# Patient Record
Sex: FEMALE | Race: BLACK OR AFRICAN AMERICAN | NOT HISPANIC OR LATINO | Employment: OTHER | URBAN - METROPOLITAN AREA
[De-identification: names, ages, dates, MRNs, and addresses within clinical notes are randomized per-mention and may not be internally consistent; named-entity substitution may affect disease eponyms.]

---

## 2017-02-27 DIAGNOSIS — Z76.82 AWAITING ORGAN TRANSPLANT: Primary | ICD-10-CM

## 2017-03-02 ENCOUNTER — LAB VISIT (OUTPATIENT)
Dept: LAB | Facility: HOSPITAL | Age: 42
End: 2017-03-02
Attending: NURSE PRACTITIONER
Payer: COMMERCIAL

## 2017-03-02 DIAGNOSIS — Z76.82 AWAITING ORGAN TRANSPLANT: ICD-10-CM

## 2017-03-02 PROCEDURE — 86833 HLA CLASS II HIGH DEFIN QUAL: CPT

## 2017-03-02 PROCEDURE — 86832 HLA CLASS I HIGH DEFIN QUAL: CPT

## 2017-03-13 DIAGNOSIS — Z76.82 ORGAN TRANSPLANT CANDIDATE: Primary | ICD-10-CM

## 2017-03-27 LAB — HPRA INTERPRETATION: NORMAL

## 2017-04-06 LAB
CLASS I ANTIBODIES - LUMINEX: NORMAL
CLASS I ANTIBODY COMMENTS - LUMINEX: NORMAL
CLASS II ANTIBODIES - LUMINEX: NORMAL
CLASS II ANTIBODY COMMENTS - LUMINEX: NORMAL
CPRA %: 96
SERUM COLLECTION DT - LUMINEX CLASS I: NORMAL
SERUM COLLECTION DT - LUMINEX CLASS II: NORMAL
SPCL1 TESTING DATE: NORMAL
SPCL2 TESTING DATE: NORMAL

## 2017-04-13 ENCOUNTER — LAB VISIT (OUTPATIENT)
Dept: LAB | Facility: HOSPITAL | Age: 42
End: 2017-04-13
Payer: COMMERCIAL

## 2017-04-13 DIAGNOSIS — Z76.82 ORGAN TRANSPLANT CANDIDATE: ICD-10-CM

## 2017-05-12 ENCOUNTER — LAB VISIT (OUTPATIENT)
Dept: LAB | Facility: HOSPITAL | Age: 42
End: 2017-05-12
Payer: COMMERCIAL

## 2017-05-12 DIAGNOSIS — Z76.82 AWAITING ORGAN TRANSPLANT: ICD-10-CM

## 2017-05-15 PROCEDURE — 86829 HLA CLASS I/II ANTIBODY QUAL: CPT | Mod: PO,TXP

## 2017-05-15 PROCEDURE — 86829 HLA CLASS I/II ANTIBODY QUAL: CPT | Mod: 91,PO,TXP

## 2017-05-16 LAB — HPRA INTERPRETATION: NORMAL

## 2017-06-08 DIAGNOSIS — Z76.82 ORGAN TRANSPLANT CANDIDATE: ICD-10-CM

## 2017-06-08 DIAGNOSIS — N18.6 ESRD (END STAGE RENAL DISEASE): Primary | ICD-10-CM

## 2017-06-22 ENCOUNTER — LAB VISIT (OUTPATIENT)
Dept: LAB | Facility: HOSPITAL | Age: 42
End: 2017-06-22
Payer: COMMERCIAL

## 2017-06-22 DIAGNOSIS — Z76.82 AWAITING ORGAN TRANSPLANT: ICD-10-CM

## 2017-07-10 LAB — HPRA INTERPRETATION: NORMAL

## 2017-07-10 PROCEDURE — 86829 HLA CLASS I/II ANTIBODY QUAL: CPT | Mod: 91,PO,TXP

## 2017-07-10 PROCEDURE — 86829 HLA CLASS I/II ANTIBODY QUAL: CPT | Mod: PO,TXP

## 2017-07-11 PROCEDURE — 86829 HLA CLASS I/II ANTIBODY QUAL: CPT | Mod: 91,PO,TXP

## 2017-07-11 PROCEDURE — 86829 HLA CLASS I/II ANTIBODY QUAL: CPT | Mod: PO,TXP

## 2017-07-17 ENCOUNTER — LAB VISIT (OUTPATIENT)
Dept: LAB | Facility: HOSPITAL | Age: 42
End: 2017-07-17
Payer: COMMERCIAL

## 2017-07-17 DIAGNOSIS — Z76.82 AWAITING ORGAN TRANSPLANT: ICD-10-CM

## 2017-07-31 DIAGNOSIS — Z76.82 ORGAN TRANSPLANT CANDIDATE: ICD-10-CM

## 2017-07-31 LAB — HPRA INTERPRETATION: NORMAL

## 2017-07-31 PROCEDURE — 86833 HLA CLASS II HIGH DEFIN QUAL: CPT | Mod: PO,TXP

## 2017-07-31 PROCEDURE — 86832 HLA CLASS I HIGH DEFIN QUAL: CPT | Mod: PO,TXP

## 2017-08-07 LAB
CLASS I ANTIBODIES - LUMINEX: NORMAL
CLASS I ANTIBODY COMMENTS - LUMINEX: NORMAL
CLASS II ANTIBODIES - LUMINEX: NORMAL
CLASS II ANTIBODY COMMENTS - LUMINEX: NORMAL
CPRA %: 98
SERUM COLLECTION DT - LUMINEX CLASS I: NORMAL
SERUM COLLECTION DT - LUMINEX CLASS II: NORMAL
SPCL1 TESTING DATE: NORMAL
SPCL2 TESTING DATE: NORMAL
SPCLU TESTING DATE: NORMAL

## 2017-08-10 PROCEDURE — 99001 SPECIMEN HANDLING PT-LAB: CPT | Mod: PO,TXP

## 2017-08-11 ENCOUNTER — LAB VISIT (OUTPATIENT)
Dept: LAB | Facility: HOSPITAL | Age: 42
End: 2017-08-11
Payer: COMMERCIAL

## 2017-08-11 DIAGNOSIS — Z76.82 AWAITING ORGAN TRANSPLANT: ICD-10-CM

## 2017-08-14 LAB — HPRA INTERPRETATION: NORMAL

## 2017-08-18 PROBLEM — N18.6 ESRD (END STAGE RENAL DISEASE): Status: ACTIVE | Noted: 2017-08-18

## 2017-08-18 PROBLEM — T85.71XA PERITONEAL DIALYSIS-ASSOCIATED PERITONITIS: Status: ACTIVE | Noted: 2017-08-18

## 2017-08-24 ENCOUNTER — HOSPITAL ENCOUNTER (OUTPATIENT)
Dept: RADIOLOGY | Facility: HOSPITAL | Age: 42
Discharge: HOME | End: 2017-08-24
Attending: NURSE PRACTITIONER
Payer: COMMERCIAL

## 2017-08-24 ENCOUNTER — OFFICE VISIT (OUTPATIENT)
Dept: TRANSPLANT | Facility: CLINIC | Age: 42
End: 2017-08-24
Payer: COMMERCIAL

## 2017-08-24 ENCOUNTER — HOSPITAL ENCOUNTER (OUTPATIENT)
Dept: CARDIOLOGY | Facility: CLINIC | Age: 42
Discharge: HOME | End: 2017-08-24
Payer: COMMERCIAL

## 2017-08-24 VITALS
SYSTOLIC BLOOD PRESSURE: 173 MMHG | RESPIRATION RATE: 16 BRPM | HEART RATE: 84 BPM | WEIGHT: 214.31 LBS | DIASTOLIC BLOOD PRESSURE: 86 MMHG | HEIGHT: 65 IN | BODY MASS INDEX: 35.71 KG/M2 | TEMPERATURE: 99 F | OXYGEN SATURATION: 99 %

## 2017-08-24 DIAGNOSIS — D63.1 ANEMIA IN STAGE 5 CHRONIC KIDNEY DISEASE, NOT ON CHRONIC DIALYSIS: ICD-10-CM

## 2017-08-24 DIAGNOSIS — N18.6 BENIGN HYPERTENSION WITH ESRD (END-STAGE RENAL DISEASE): Primary | ICD-10-CM

## 2017-08-24 DIAGNOSIS — I12.0 BENIGN HYPERTENSION WITH ESRD (END-STAGE RENAL DISEASE): Primary | ICD-10-CM

## 2017-08-24 DIAGNOSIS — N18.5 ANEMIA IN STAGE 5 CHRONIC KIDNEY DISEASE, NOT ON CHRONIC DIALYSIS: ICD-10-CM

## 2017-08-24 DIAGNOSIS — N18.6 ESRD (END STAGE RENAL DISEASE): ICD-10-CM

## 2017-08-24 DIAGNOSIS — E11.22 DIABETES MELLITUS WITH ESRD (END-STAGE RENAL DISEASE): ICD-10-CM

## 2017-08-24 DIAGNOSIS — Z79.4 INSULIN LONG-TERM USE: ICD-10-CM

## 2017-08-24 DIAGNOSIS — N18.6 DIABETES MELLITUS WITH ESRD (END-STAGE RENAL DISEASE): ICD-10-CM

## 2017-08-24 DIAGNOSIS — Z76.82 ORGAN TRANSPLANT CANDIDATE: Primary | ICD-10-CM

## 2017-08-24 DIAGNOSIS — Z76.82 PATIENT ON WAITING LIST FOR KIDNEY TRANSPLANT: ICD-10-CM

## 2017-08-24 LAB
DIASTOLIC DYSFUNCTION: NO
ESTIMATED PA SYSTOLIC PRESSURE: 22.18
RETIRED EF AND QEF - SEE NOTES: 60 (ref 55–65)

## 2017-08-24 PROCEDURE — 93978 VASCULAR STUDY: CPT | Mod: TC,TXP

## 2017-08-24 PROCEDURE — 93978 VASCULAR STUDY: CPT | Mod: 26,TXP,, | Performed by: RADIOLOGY

## 2017-08-24 PROCEDURE — 3008F BODY MASS INDEX DOCD: CPT | Mod: S$GLB,TXP,, | Performed by: NURSE PRACTITIONER

## 2017-08-24 PROCEDURE — 71020 XR CHEST PA AND LATERAL: CPT | Mod: 26,NTX,, | Performed by: RADIOLOGY

## 2017-08-24 PROCEDURE — 4010F ACE/ARB THERAPY RXD/TAKEN: CPT | Mod: S$GLB,TXP,, | Performed by: NURSE PRACTITIONER

## 2017-08-24 PROCEDURE — 99215 OFFICE O/P EST HI 40 MIN: CPT | Mod: S$GLB,TXP,, | Performed by: NURSE PRACTITIONER

## 2017-08-24 PROCEDURE — 93306 TTE W/DOPPLER COMPLETE: CPT | Mod: S$GLB,TXP,, | Performed by: INTERNAL MEDICINE

## 2017-08-24 PROCEDURE — 3044F HG A1C LEVEL LT 7.0%: CPT | Mod: S$GLB,TXP,, | Performed by: NURSE PRACTITIONER

## 2017-08-24 PROCEDURE — 99999 PR PBB SHADOW E&M-EST. PATIENT-LVL V: CPT | Mod: PBBFAC,TXP,, | Performed by: NURSE PRACTITIONER

## 2017-08-24 PROCEDURE — 71020 XR CHEST PA AND LATERAL: CPT | Mod: TC,TXP

## 2017-08-24 RX ORDER — CALCIUM ACETATE 667 MG/1
1334 CAPSULE ORAL
COMMUNITY
End: 2018-01-16

## 2017-08-24 NOTE — LETTER
August 26, 2017        Roselia Rose  [address] [address] [address]  Phone: [phone]  Fax: [phone]             [facility]  [address]  [facility] [address]  Phone: [phone]   Patient: Gilda Schroeder   MR Number: [id number]   YOB: 1975   Date of Visit: 8/24/2017       Dear Dr. Roselia Rose    Thank you for referring Gilda Schroeder to me for evaluation. Attached you will find relevant portions of my assessment and plan of care.    If you have questions, please do not hesitate to call me. I look forward to following Gilda Schroeder along with you.    Sincerely,    Sonia Mckeon, NP    Enclosure    If you would like to receive this communication electronically, please contact [email or web address] or [phone] to request [facility] access.    [facility] is a tool which provides read-only access to select patient information with whom you have a relationship. Its easy to use and provides real time access to review your patients record including encounter summaries, notes, results, and demographic information.    If you feel you have received this communication in error or would no longer like to receive these types of communications, please e-mail [email or web address]

## 2017-08-24 NOTE — PROGRESS NOTES
Kidney Transplant Recipient Reevalulation    Referring Physician: Roselia Rose  Current Nephrologist: Roselia Rose  Waitlist Status: active  Dialysis Start Date: 3/31/2016    Subjective:     CC:  Annual reassessment of kidney transplant candidacy.    HPI:  Ms. Jovani Schroeder is a 42 y.o. year old Black or  female with ESRD secondary to diabetic nephropathy and HTN.  She has been on the wait list for a kidney transplant at [facility] since 3/31/2016. Patient was initially on PD starting on 3/31/2017. She  started to have a peritonitis infection on 7/24/2017. She then  completed 3 courses of antibiotics, which resulted in a yeast infection. A right Chest catheter was inserted,  the PD catheter was pulled,  and she started HD on  8/21/2017 . Patient is now dialyzing on MWF schedule.  Patient reports that she is tolerating dialysis well. She has a right  chest catheter. . Patient denies any recent hospitalizations or ED visits. She is scheduled on 8/28 for AV fistula placement. She plans to start the education process for home HD.  She states she is feeling much better now.     Reports her son called today  About being a donor.      8/24/2017  Chest xray  Impression   No convincing evidence of intrathoracic disease identified.     8/24/2017  Iliac doppler study  Findings:  The right iliac artery demonstrates a velocity of 159cm/sec and a triphasic wave.  Left iliac artery demonstrates a velocity of 145 cm/sec with a triphasic wave.   The right iliac vein demonstrates a velocity of 55 cm/sec and is  phasic.  The left iliac vein demonstrates a velocity of 59 cm/sec and is  phasic.  Impression   The iliac arteries and veins are patent and symmetric bilaterally.  No significant abnormality identified.     8/24/2017  2 D Echo  Intracavitary: There is no evidence of pericardial effusion, intracavity mass, thrombi, or vegetation.   CONCLUSIONS     1 - Concentric remodeling.     2 - No wall motion  "abnormalities.     3 - Normal left ventricular systolic function (EF 60-65%).     4 - Normal left ventricular diastolic function.     5 - Normal right ventricular systolic function .     6 - The estimated PA systolic pressure is 22 mmHg.     Past Medical History:   Diagnosis Date    Allergy     morphine    Anemia     Anticoagulant long-term use     heparin    Diabetes mellitus, type 2     Disorder of kidney and ureter     ESRD (end stage renal disease) on dialysis     Hypertension     Obesity     Peritoneal dialysis catheter in place     Peritonitis associated with peritoneal dialysis 08/2017       Review of Systems   Constitutional: Negative for activity change, appetite change, chills, fatigue, fever and unexpected weight change.   HENT: Negative for congestion, facial swelling, postnasal drip, rhinorrhea, sinus pressure, sore throat and trouble swallowing.    Eyes: Negative for pain, redness and visual disturbance.   Respiratory: Negative for cough, chest tightness, shortness of breath and wheezing.    Cardiovascular: Negative.  Negative for chest pain, palpitations and leg swelling.        Right chest catheter   Gastrointestinal: Negative for abdominal pain, diarrhea, nausea and vomiting.   Genitourinary: Negative for dysuria, flank pain and urgency.   Musculoskeletal: Negative for gait problem, neck pain and neck stiffness.   Skin: Negative for rash.   Allergic/Immunologic: Negative for environmental allergies, food allergies and immunocompromised state.   Neurological: Negative for dizziness, weakness, light-headedness and headaches.   Psychiatric/Behavioral: Negative for agitation and confusion. The patient is not nervous/anxious.        Objective:   body mass index is 35.66 kg/m².  BP (!) 173/86 (BP Location: Right arm, Patient Position: Sitting, BP Method: X-Large (Automatic))   Pulse 84   Temp 98.5 °F (36.9 °C) (Oral)   Resp 16   Ht 5' 5" (1.651 m)   Wt 97.2 kg (214 lb 4.6 oz)   LMP " 08/13/2017   SpO2 99%   BMI 35.66 kg/m²     Physical Exam   Constitutional: She is oriented to person, place, and time. She appears well-developed and well-nourished.   HENT:   Head: Normocephalic.   Mouth/Throat: Oropharynx is clear and moist. No oropharyngeal exudate.   Eyes: Conjunctivae and EOM are normal. Pupils are equal, round, and reactive to light. No scleral icterus.   Neck: Normal range of motion. Neck supple.   Cardiovascular: Normal rate, regular rhythm and normal heart sounds.    Pulmonary/Chest: Effort normal and breath sounds normal.       Abdominal: Soft. Normal appearance and bowel sounds are normal. She exhibits no distension and no mass. There is no splenomegaly or hepatomegaly. There is no tenderness. There is no rebound, no guarding, no CVA tenderness, no tenderness at McBurney's point and negative Jenkins's sign.       Musculoskeletal: Normal range of motion. She exhibits no edema.   Lymphadenopathy:     She has no cervical adenopathy.   Neurological: She is alert and oriented to person, place, and time. She exhibits normal muscle tone. Coordination normal.   Skin: Skin is warm and dry.   Psychiatric: She has a normal mood and affect. Her behavior is normal.   Vitals reviewed.      Labs:  Lab Results   Component Value Date    WBC 9.39 07/05/2016    HGB 9.3 (L) 08/18/2017    HCT 32.0 (L) 07/05/2016     08/18/2017    K 3.6 08/18/2017     08/18/2017    CO2 24 08/18/2017    BUN 55 (H) 08/18/2017    CREATININE 9.19 (H) 08/18/2017    EGFRNONAA 5 (A) 08/18/2017    CALCIUM 9.2 08/18/2017    PHOS 5.7 (H) 07/05/2016    ALBUMIN 2.4 (L) 07/05/2016    AST 19 07/05/2016    ALT 15 07/05/2016    .0 (H) 07/19/2016       No results found for: PREALBUMIN, BILIRUBINUA, GGT, AMYLASE, LIPASE, PROTEINUA, NITRITE, RBCUA, WBCUA    No results found for: HLAABCTYPE    Lab Results   Component Value Date    CPRA 98 06/16/2017    DB9ALJI  06/16/2017      B49,B51,B52,B63,B59,A32,B77,B38,B13,B53,A25,B76,A31,B44,B50,B61,B60,B45,A33,B41,B47,B37,A74,A2,B58,A80,B46,B57,B73    CIABCLM  06/16/2017     A*30:01--WEAK CW9, CW8, CW10, CW1, A66, CW14, CW12, A68, A*11:02    CIIAB DR15,DR51,DR53,DR16,DR7,,DR1,DR9,DR4,DQ2 06/16/2017    ABCMT DRB1*14:02-- WEAK DR10, DRB1*13:03 06/16/2017       Labs were reviewed with the patient.    Pre-transplant Workup:   Reviewed with the patient.    Assessment:     1. Benign hypertension with ESRD (end-stage renal disease)    2. Patient on waiting list for kidney transplant    3. BMI 35.0-35.9,adult    4. Diabetes mellitus with ESRD (end-stage renal disease)    5. ESRD (end stage renal disease)    6. Insulin long-term use    7. Anemia in stage 5 chronic kidney disease, not on chronic dialysis        Plan:   MMG due 9/2017    Transplant Candidacy:   Ms. Jovani Schroeder is a suitable kidney transplant candidate.  She remains in overall stable health, and will remain active on the transplant list.    Sonia Mckeon NP       Follow-up:   In addition to the tests noted in the plan, Ms. Jovani Schroeder will continue to have reevaluation as per the standing pre-kidney transplant protocol:  1. Monthly blood for PRA  2. Annual return to clinic, except HIV positive, > 65 years of age, or pancreas transplant candidates who will be scheduled to see transplant every 6 months while in pre-transplant phase  3. Annual re-testing: CXR, EKG, yearly mammograms for women over 40 and PSA for males over 40, cardiology follow-up as recommended by initial cardiology pre-transplant evaluation  4. Renal ultrasound every 2 years  5. Baseline colonoscopy after age 50 and repeated as recommended    UNOS Patient Status  Functional Status: 60% - Requires occasional assistance but is able to care for needs  Physical Capacity: No Limitations

## 2017-08-25 LAB
HLA FREEZE AND HOLD INTERPRETATION: NORMAL
HPRA INTERPRETATION: NORMAL

## 2017-09-01 NOTE — PROGRESS NOTES
Transplant Recipient Adult Psychosocial Assessment Update    Gilda Schroeder  [address] [place] [address]    Telephone Information:   Mobile [phone]   Home  [phone] (home)  Work  There is no work phone number on file.  E-mail  [email or web address]    Sex: female  YOB: 1975  Age: 42 y.o.    Encounter Date: 8/24/2017  [place] Citizen: yes  Primary Language: English   Needed: no    Emergency Contact:  Name: Kenrick Schroeder  Relationship:   Address: same as pt  Phone Numbers:  [phone]    Family/Social Support:   Number of dependents/: 2 minor children (Mari and Jen) who will be taken care of by pt's adult children or pt's sister Rm and/or brother.  Marital history: pt reports being  to pt's  for the past 15 years.    Other family dynamics: Pt reports parents are living.  Very complicated family of origin history.  See previous history dated 7/5/16.  Pt reports having 4 children:  Jeane Lemus, age 22, does drive, lives in [place], [place]; Denis Hahn, age 20, lives with pt; Mari Schroeder, age 16, lives with pt; Jen Schroeder, age 10, lives with pt.  Pt reports having 1 supportive sister and 1 brother  (not part of pt's life, frequently in trouble with the law). Pt also discussed having a very supportive Jew Family as well.    Household Composition:  Name: Kenrick Schroeder  Age: 42  Relationship:   Does person drive? yes    Name: Clotildedebi Schroeder  Age: 16  Relationship: daughter  Does person drive? yes     Name: Jen Schroeder  Age: 10  Relationship: daughter  Does person drive? Yes    Do you and your caregivers have access to reliable transportation? yes  PRIMARY CAREGIVER: Kenrick pt's , will be primary caregiver, phone number [phone].      provided in-depth information to patient and caregiver regarding pre- and post-transplant caregiver role.   strongly encourages patient and caregiver to have  concrete plan regarding post-transplant care giving, including back-up caregiver(s) to ensure care giving needs are met as needed.    Patient and Caregiver states understanding all aspects of caregiver role/commitment and is able/willing/committed to being caregiver to the fullest extent necessary.    Patient and Caregiver verbalizes understanding of the education provided today and caregiver responsibilities.         remains available. Patient and Caregiver agree to contact  in a timely manner if concerns arise.      Able to take time off work without financial concerns: yes.     Additional Significant Others who will Assist with Transplant:  Name: Rm Flores  Age: 35 (cher)  City: [place] State: [place]  Relationship: sister  Does person drive? yes ; [phone]    Name: Sadaf So  Age: 42  City: [place] State: [place]  Relationship: cousin   Does person drive? yes ; phone [phone]     Name: Jeane Michele  Age: 22  City: [place] State: [place]  Relationship: dtr   Does person drive? yes ; phone [phone]      Living Will: no  Healthcare Power of : no  Advance Directives on file: <<no information> per medical record.  Verbally reviewed LW/HCPA information.   provided patient with copy of LW/HCPA documents and provided education on completion of forms.    Living Donors:  Possibly pt's son. Denis Hahn.   Education and resource information given to patient.   SW provided education on if pt's caregiver is pt's pot donor they can no longer be pt's caregiver until medically cleared to do so.  Pt verbalizes understanding and agreement with.  Pt's sister (Rm) will serve as pt's caregiver if pt's  if accepted as pt's pot donor.      Highest Education Level: Attended College/Technical School  Reading Ability: college  Reports difficulty with: seeing and wears glasses  Learns Best By:  a combination of verbal, written, and hands-on instructions.     Status:  no  VA Benefits: no     Working for Income: No  If no, reason not working: Disability  Patient is disabled.  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    Spouse/Significant Other Employment: pt's  reports working fulltime as a [employer] in the [employer] and reports can take off of work as needed.    Disabled: yes: date disability began: 2016, due to: ESRD.    Monthly Income:  SS Disability: $218  Other household members total: $3000  Able to afford all costs now and if transplanted, including medications: yes  Patient and Caregiver verbalizes understanding of personal responsibilities related to transplant costs and the importance of having a financial plan to ensure that patients transplant costs are fully covered.       provided fundraising information/education. Patient and Caregiververbalizes understanding.   remains available.    Insurance:   Payor/Plan Subscr  Sex Relation Sub. Ins. ID Effective Group Num   1. CIGNA - CIGNA* GERMAN ALVA,YEISON 1975 Female  [id number]                                   [address], [place] [address]   2. CIGNA - CIGNA* ROSA ALVA 1973 Male  [id number]                                   [address]     Primary Insurance (for UNOS reporting): Private Insurance via pt's 's employer.  SW provided education regarding premium and disability limitations when solely based on ESRD.  Pt verbalizes understanding and reports pt's SW at dialysis is currently working to have pt participate in this arthur.  SW remains available.    Secondary Insurance (for UNOS reporting): None     Patient and Caregiver verbalizes clear understanding that patient may experience difficulty obtaining and/or be denied insurance coverage post-surgery. This includes and is not limited to disability insurance, life insurance, health insurance,  "burial insurance, long term care insurance, and other insurances.      Patient and Caregiver also reports understanding that future health concerns related to or unrelated to transplantation may not be covered by patient's insurance.  Resources and information provided and reviewed.     Patient and Caregiver provides verbal permission to release any necessary information to outside resources for patient care and discharge planning.  Resources and information provided are reviewed.      Dialysis Adherence: Patient reports adherence with all aspects of plan or care, including medications, appointments, diet, and dialysis. Pt recently stated HD (this past Monday) and stated it was "tough", but said she will be fine. SW provided adherence education and counseling.  Pt verbalized understanding.  SW remains available.  See pt's EMR for previous adherence check.    Infusion Service: patient utilizing? no  Home Health: patient utilizing? no  DME: no  Pulmonary/Cardiac Rehab: denies   ADLS:  Pt reports pt is independent with all ADLS including driving, bathing, walking, taking medications, cooking, housekeeping, eating, and shopping.    Adherence:    Pt states current and expected adherence with all healthcare recommendations.  Adherence education and counseling provided.     Per History Section:  Past Medical History:   Diagnosis Date    Allergy     morphine    Anemia     Anticoagulant long-term use     heparin    Diabetes mellitus, type 2     Disorder of kidney and ureter     ESRD (end stage renal disease) on dialysis     Hypertension     Obesity     Peritoneal dialysis catheter in place     Peritonitis associated with peritoneal dialysis 08/2017     Social History - per psychosocial   Substance Use Topics    Smoking status: Former Smoker     Packs/day: 0.50     Types: Cigarettes     Quit date: 7/1/2014    Smokeless tobacco: Not on file    Alcohol use No      Comment: rare social drinker     History   Drug Use " No       Patient and Caregiver states clear understanding of the potential impact of substance use as it relates to transplant candidacy and is aware of possible random substance screening.  Substance abstinence/cessation counseling, education and resources provided and reviewed.     Arrests/DWI/Treatment/Rehab: patient denies    Psychiatric History:    Mental Health: Patient denies having current and/or past depression, emotional concerns, mental health diagnosis and anxiety at this time. Pt stated she has received regular counseling from her , Thomas Pompa at [facility]. She now gets counseling as needed.   Medications:  Patient denies patient is taking medication(s) for mental health issues at this time.      Suicide/Homicide Issues: Pt denies any current and/or past SI/HI at this time.   Safety at home: Pt denies any current and/or past DV concerns at this time.  Pt reports feeling safe in pt's home environment.      Knowledge: Patient and Caregiver states having clear understanding and realistic expectations regarding the potential risks and potential benefits of organ transplantation and organ donation and agrees to discuss with health care team members and support system members, as well as to utilize available resources and express questions and/or concerns in order to further facilitate the pt informed decision-making.  Resources and information provided and reviewed.    Patient and Caregiver is aware of [facility]'s affiliation and/or partnership with agencies in home health care, LTAC, SNF, [facility], and other hospitals and clinics.    Understanding: Patient and Caregiver reports having a clear understanding of the many lifetime commitments involved with being a transplant recipient, including costs, compliance, medications, lab work, procedures, appointments, concrete and financial planning, preparedness, timely and appropriate communication of concerns, abstinence (ETOH, tobacco, illicit  non-prescribed drugs), adherence to all health care team recommendations, support system and caregiver involvement, appropriate and timely resource utilization and follow-through, mental health counseling as needed/recommended, and patient and caregiver responsibilities.  Social Service Handbook, resources and detailed educational information provided and reviewed.  Educational information provided.    Patient and Caregiver also reports current and expected compliance with health care regime and states having a clear understanding of the importance of compliance.      Patient and Caregiver reports a clear understanding that risks and benefits may be involved with organ transplantation and with organ donation.       Patient and Caregiver also reports clear understanding that psychosocial risk factors may affect patient, and include but are not limited to feelings of depression, generalized anxiety, anxiety regarding dependence on others, post traumatic stress disorder, feelings of guilt and other emotional and/or mental concerns, and/or exacerbation of existing mental health concerns.  Detailed resources provided and discussed.      Patient and Caregiver agrees to access appropriate resources in a timely manner as needed and/or as recommended, and to communicate concerns appropriately.  Patient and Caregiver also reports a clear understanding of treatment options available.     Patient and Caregiver received education in a group setting.   reviewed education, provided additional information, and answered questions.    Feelings or Concerns: Pt denies having any concerns and/or overwhelming feelings regarding transplant at this time. Pt reports high motivation to pursue organ transplant at this time.    Coping: Patient reports that she is coping with the work-up process.  Patient reports utilizing family members, friends, engaging in hobbies: social media, reading, and watching tv, prayer and spiritual  "support: active member of [employer] as patient's effective coping strategies.  Pt denies need for additional assistance at this time. Pt agrees to call transplant team as needed. SW remains available.       Goals: Pt reports a goal of ending dialysis and returning to work. Patient referred to Vocational Rehabilitation. SW provided support and education. Pt verbalizes understanding that transplant is not a guarantee of ending dialysis and life after transplantation will be a "new normal" post transplant.  SW remains available.    Interview Behavior: Patient and Caregivers presents as alert and oriented x 4, pleasant, good eye contact, calm, communicative, cooperative and asking and answering questions appropriately. Pt presented with  and dtr who appeared to be supportive of pt's pursuit of transplant.         Transplant Social Work - Candidacy  Assessment/Plan:     Psychosocial Suitability: Patient presents as an acceptable candidate for kidney transplant at this time. Based on psychosocial risk factors, patient presents as low risk, due to pt's stable financial plan, pt's good adherence, pt coping appropriately with the work-up process, and pt's confirmed caregiver plan. Final determination of transplant candidacy will be made once evaluation is complete and reviewed by the Kidney & Kidney/Pancreas Selection Committee.    Recommendations/Additional Comments:  counseled patient and caregivers extensively on fundraising, housing, transportation and caregiver plan.   recommends fundraising.  [email or web address]   Patient verbalizes understanding.   remains available.      Marianna Menon LCSW       "

## 2017-09-11 PROCEDURE — 86833 HLA CLASS II HIGH DEFIN QUAL: CPT | Mod: PO,TXP

## 2017-09-11 PROCEDURE — 86832 HLA CLASS I HIGH DEFIN QUAL: CPT | Mod: PO,TXP

## 2017-09-13 ENCOUNTER — LAB VISIT (OUTPATIENT)
Dept: LAB | Facility: HOSPITAL | Age: 42
End: 2017-09-13
Payer: COMMERCIAL

## 2017-09-13 DIAGNOSIS — Z76.82 AWAITING ORGAN TRANSPLANT: ICD-10-CM

## 2017-09-21 ENCOUNTER — HOSPITAL ENCOUNTER (OUTPATIENT)
Dept: RADIOLOGY | Facility: HOSPITAL | Age: 42
Discharge: HOME | End: 2017-09-21
Attending: NURSE PRACTITIONER
Payer: COMMERCIAL

## 2017-09-21 VITALS — BODY MASS INDEX: 35.65 KG/M2 | WEIGHT: 214 LBS | HEIGHT: 65 IN

## 2017-09-21 DIAGNOSIS — Z76.82 ORGAN TRANSPLANT CANDIDATE: ICD-10-CM

## 2017-09-21 PROCEDURE — 77063 BREAST TOMOSYNTHESIS BI: CPT | Mod: 26,TXP,, | Performed by: RADIOLOGY

## 2017-09-21 PROCEDURE — 77067 SCR MAMMO BI INCL CAD: CPT | Mod: 26,TXP,, | Performed by: RADIOLOGY

## 2017-09-21 PROCEDURE — 77067 SCR MAMMO BI INCL CAD: CPT | Mod: TC,TXP

## 2017-09-27 LAB — HPRA INTERPRETATION: NORMAL

## 2017-10-09 DIAGNOSIS — Z76.82 ORGAN TRANSPLANT CANDIDATE: Primary | ICD-10-CM

## 2017-10-11 PROCEDURE — 99001 SPECIMEN HANDLING PT-LAB: CPT | Mod: PO,TXP

## 2017-10-16 ENCOUNTER — LAB VISIT (OUTPATIENT)
Dept: LAB | Facility: HOSPITAL | Age: 42
End: 2017-10-16
Payer: COMMERCIAL

## 2017-10-16 DIAGNOSIS — Z76.82 AWAITING ORGAN TRANSPLANT: ICD-10-CM

## 2017-10-25 DIAGNOSIS — Z76.82 ORGAN TRANSPLANT CANDIDATE: Primary | ICD-10-CM

## 2017-10-29 ENCOUNTER — LAB VISIT (OUTPATIENT)
Dept: LAB | Facility: HOSPITAL | Age: 42
End: 2017-10-29
Payer: COMMERCIAL

## 2017-10-29 DIAGNOSIS — Z76.82 ORGAN TRANSPLANT CANDIDATE: ICD-10-CM

## 2017-10-29 PROCEDURE — 86825 HLA X-MATH NON-CYTOTOXIC: CPT | Mod: 91,PO,TXP

## 2017-10-29 PROCEDURE — 86825 HLA X-MATH NON-CYTOTOXIC: CPT | Mod: PO,TXP

## 2017-10-29 PROCEDURE — 86826 HLA X-MATCH NONCYTOTOXC ADDL: CPT | Mod: 91,PO,TXP

## 2017-11-05 PROCEDURE — 99001 SPECIMEN HANDLING PT-LAB: CPT | Mod: PO,TXP

## 2017-11-07 LAB
HLA FREEZE AND HOLD INTERPRETATION: NORMAL
HPRA INTERPRETATION: NORMAL

## 2017-11-09 ENCOUNTER — LAB VISIT (OUTPATIENT)
Dept: LAB | Facility: HOSPITAL | Age: 42
End: 2017-11-09
Payer: COMMERCIAL

## 2017-11-09 DIAGNOSIS — Z76.82 AWAITING ORGAN TRANSPLANT: ICD-10-CM

## 2017-11-16 LAB
B CELL RESULTS - XM ALLO: POSITIVE
B MCS AVERAGE - XM ALLO: 285
B MCS AVERAGE - XM ALLO: 391.5
B MCS AVERAGE - XM ALLO: 429.5
DONOR MRN: NORMAL
FXMAL TESTING DATE: NORMAL
HLA FLOW CROSSMATCH (ALLO) INTERPRETATION: NORMAL
SERUM COLLECTION DT - XM ALLO: NORMAL
T CELL RESULTS - XM ALLO: POSITIVE
T MCS AVERAGE - XM ALLO: 214.5
T MCS AVERAGE - XM ALLO: 291
T MCS AVERAGE - XM ALLO: 338

## 2017-11-27 ENCOUNTER — TELEPHONE (OUTPATIENT)
Dept: OBSTETRICS AND GYNECOLOGY | Facility: CLINIC | Age: 42
End: 2017-11-27

## 2017-11-27 ENCOUNTER — OFFICE VISIT (OUTPATIENT)
Dept: OBSTETRICS AND GYNECOLOGY | Facility: CLINIC | Age: 42
End: 2017-11-27
Payer: COMMERCIAL

## 2017-11-27 VITALS
HEIGHT: 65 IN | SYSTOLIC BLOOD PRESSURE: 130 MMHG | WEIGHT: 216.06 LBS | DIASTOLIC BLOOD PRESSURE: 86 MMHG | BODY MASS INDEX: 36 KG/M2

## 2017-11-27 DIAGNOSIS — Z00.00 PREVENTATIVE HEALTH CARE: ICD-10-CM

## 2017-11-27 DIAGNOSIS — Z01.419 ENCOUNTER FOR WELL WOMAN EXAM WITH ROUTINE GYNECOLOGICAL EXAM: Primary | ICD-10-CM

## 2017-11-27 PROCEDURE — 88141 CYTOPATH C/V INTERPRET: CPT | Mod: ,,, | Performed by: PATHOLOGY

## 2017-11-27 PROCEDURE — 99999 PR PBB SHADOW E&M-EST. PATIENT-LVL III: CPT | Mod: PBBFAC,,, | Performed by: NURSE PRACTITIONER

## 2017-11-27 PROCEDURE — 88175 CYTOPATH C/V AUTO FLUID REDO: CPT | Performed by: PATHOLOGY

## 2017-11-27 PROCEDURE — 99386 PREV VISIT NEW AGE 40-64: CPT | Mod: S$GLB,,, | Performed by: NURSE PRACTITIONER

## 2017-11-27 NOTE — TELEPHONE ENCOUNTER
----- Message from Maine Flores sent at 11/27/2017  3:12 PM CST -----  Contact: PT   PT states she is running 15 minutes late. .[phone] (home)

## 2017-11-27 NOTE — PROGRESS NOTES
"CC: Well woman exam    Gilda Schroeder is a 42 y.o. female  presents for well woman exam.  LMP: Patient's last menstrual period was 2017..  No issues, problems, or complaints. Cycles are every 26-28 days and not heavy. On HD secondary to DM, needs pap exam for pre-kidney work-up. Last pap exam was normal. Last mammogram normal,.     Past Medical History:   Diagnosis Date    Allergy     morphine    Anemia     Anticoagulant long-term use     heparin    Diabetes mellitus, type 2     Disorder of kidney and ureter     ESRD (end stage renal disease) on dialysis     Hypertension     Obesity     Peritoneal dialysis catheter in place     Peritonitis associated with peritoneal dialysis 2017     Past Surgical History:   Procedure Laterality Date     SECTION      PLACEMENT PERITONEAL DIALYSIS CATHETER      PORTACATH PLACEMENT      for dialysis; removed    TUBAL LIGATION       Social History     Social History    Marital status:      Spouse name: N/A    Number of children: N/A    Years of education: N/A     Occupational History          unemployed     Social History Main Topics    Smoking status: Former Smoker     Packs/day: 0.50     Years: 9.00     Types: Cigarettes     Quit date: 2014    Smokeless tobacco: Never Used    Alcohol use Yes      Comment: rare social drinker    Drug use: No    Sexual activity: Yes     Other Topics Concern    Not on file     Social History Narrative    No narrative on file     Family History   Problem Relation Age of Onset    Diabetes Mother     No Known Problems Father     No Known Problems Sister     No Known Problems Brother      OB History      Para Term  AB Living    4 4 4     4    SAB TAB Ectopic Multiple Live Births                       /86   Ht 5' 5" (1.651 m)   Wt 98 kg (216 lb 0.8 oz)   LMP 2017   BMI 35.95 kg/m²       ROS:  GENERAL: Denies weight gain or weight loss. Feeling well " overall.   SKIN: Denies rash or lesions.   HEAD: Denies head injury or headache.   NODES: Denies enlarged lymph nodes.   CHEST: Denies chest pain or shortness of breath.   CARDIOVASCULAR: Denies palpitations or left sided chest pain.   ABDOMEN: No abdominal pain, constipation, diarrhea, nausea, vomiting or rectal bleeding.   URINARY: No frequency, dysuria, hematuria, or burning on urination.  REPRODUCTIVE: See HPI.   BREASTS: The patient performs breast self-examination and denies pain, lumps, or nipple discharge.   HEMATOLOGIC: No easy bruisability or excessive bleeding.   MUSCULOSKELETAL: Denies joint pain or swelling.   NEUROLOGIC: Denies syncope or weakness.   PSYCHIATRIC: Denies depression, anxiety or mood swings.    PHYSICAL EXAM:  APPEARANCE: Obese AA female, in no acute distress.  AFFECT: WNL, alert and oriented x 3  SKIN: No acne or hirsutism  NECK: Neck symmetric without masses or thyromegaly  NODES: No inguinal, cervical, axillary, or femoral lymph node enlargement  CHEST: Good respiratory effect  ABDOMEN: Soft.  No tenderness or masses.  No hepatosplenomegaly.  No hernias.  BREASTS: Symmetrical, no skin changes or visible lesions.  No palpable masses, nipple discharge bilaterally.  PELVIC: Normal external genitalia without lesions.  Normal hair distribution.  Adequate perineal body, normal urethral meatus.  Vagina moist and well rugated without lesions or discharge.  Cervix pink, without lesions, discharge or tenderness.  No significant cystocele or rectocele.  Bimanual exam shows uterus to be normal size, regular, mobile and nontender.  Adnexa without masses or tenderness.    EXTREMITIES: No edema.    1. Encounter for well woman exam with routine gynecological exam  Liquid-based pap smear, screening   2. Preventative health care  Liquid-based pap smear, screening    PLAN:  Pap exam  Patient was counseled today on A.C.S. Pap guidelines and recommendations for yearly pelvic exams, mammograms and monthly  self breast exams; to see her PCP for other health maintenance.

## 2017-11-30 LAB
HLA FREEZE AND HOLD INTERPRETATION: NORMAL
HLAFH COLLECTION DATE: NORMAL
HPRA INTERPRETATION: NORMAL

## 2017-12-10 PROBLEM — R79.89 ELEVATED TROPONIN: Status: ACTIVE | Noted: 2017-12-10

## 2017-12-10 PROBLEM — J81.0 ACUTE PULMONARY EDEMA: Status: ACTIVE | Noted: 2017-12-10

## 2017-12-12 PROBLEM — R07.9 CHEST PAIN: Status: ACTIVE | Noted: 2017-12-12

## 2017-12-12 PROBLEM — R06.02 SOB (SHORTNESS OF BREATH): Status: ACTIVE | Noted: 2017-12-12

## 2018-01-10 ENCOUNTER — TELEPHONE (OUTPATIENT)
Dept: CARDIOLOGY | Facility: CLINIC | Age: 43
End: 2018-01-10

## 2018-01-10 PROBLEM — R00.2 PALPITATIONS: Status: ACTIVE | Noted: 2018-01-10

## 2018-01-10 PROBLEM — K21.9 GASTROESOPHAGEAL REFLUX DISEASE WITHOUT ESOPHAGITIS: Status: ACTIVE | Noted: 2018-01-10

## 2018-01-10 NOTE — TELEPHONE ENCOUNTER
----- Message from Jessica Carrillo sent at 1/10/2018  2:18 PM CST -----  Contact: self  Patient [phone] is calling from Dialysis and asking to speak with Nurse concerning her resting heart rate which has been elevated/today it is 121 and she feels she is out of breath with just very short walks/gave call to Medical Screening Nurse at 2:28pm on 01 10 18 for her to speak with Dialysis Nurse

## 2018-01-11 PROBLEM — R00.0 SINUS TACHYCARDIA: Status: ACTIVE | Noted: 2018-01-11

## 2018-01-11 PROBLEM — R11.2 NAUSEA & VOMITING: Status: ACTIVE | Noted: 2018-01-11

## 2018-01-15 PROCEDURE — 86832 HLA CLASS I HIGH DEFIN QUAL: CPT | Mod: PO,TXP

## 2018-01-15 PROCEDURE — 86833 HLA CLASS II HIGH DEFIN QUAL: CPT | Mod: PO,TXP

## 2018-01-16 ENCOUNTER — OFFICE VISIT (OUTPATIENT)
Dept: CARDIOLOGY | Facility: CLINIC | Age: 43
End: 2018-01-16
Payer: COMMERCIAL

## 2018-01-16 VITALS
BODY MASS INDEX: 35.55 KG/M2 | SYSTOLIC BLOOD PRESSURE: 178 MMHG | WEIGHT: 213.38 LBS | DIASTOLIC BLOOD PRESSURE: 93 MMHG | HEART RATE: 112 BPM | HEIGHT: 65 IN

## 2018-01-16 DIAGNOSIS — I12.0 BENIGN HYPERTENSION WITH ESRD (END-STAGE RENAL DISEASE): Primary | ICD-10-CM

## 2018-01-16 DIAGNOSIS — I25.10 CORONARY ARTERY DISEASE INVOLVING NATIVE CORONARY ARTERY OF NATIVE HEART WITHOUT ANGINA PECTORIS: ICD-10-CM

## 2018-01-16 DIAGNOSIS — N18.6 BENIGN HYPERTENSION WITH ESRD (END-STAGE RENAL DISEASE): Primary | ICD-10-CM

## 2018-01-16 PROCEDURE — 99999 PR PBB SHADOW E&M-EST. PATIENT-LVL III: CPT | Mod: PBBFAC,TXP,, | Performed by: INTERNAL MEDICINE

## 2018-01-16 PROCEDURE — 99214 OFFICE O/P EST MOD 30 MIN: CPT | Mod: NTX,S$GLB,, | Performed by: INTERNAL MEDICINE

## 2018-01-16 RX ORDER — CARVEDILOL 25 MG/1
25 TABLET ORAL 2 TIMES DAILY
Qty: 60 TABLET | Refills: 5 | Status: SHIPPED | OUTPATIENT
Start: 2018-01-16 | End: 2019-01-16

## 2018-01-16 NOTE — PROGRESS NOTES
Subjective:    Patient ID:  Gilda Schroeder is a 42 y.o. female who presents for follow-up of cad    HPI  Admitted to [facility] last month with ACS  Ended up getting RCA stent  Doing well cardiac wise  Starting home HD soon    Review of Systems   Constitution: Negative for decreased appetite, weakness, malaise/fatigue, weight gain and weight loss.   Cardiovascular: Negative for chest pain, dyspnea on exertion, leg swelling, palpitations and syncope.   Respiratory: Negative for cough and shortness of breath.    Gastrointestinal: Negative.    All other systems reviewed and are negative.       Objective:    Physical Exam   Constitutional: She is oriented to person, place, and time. She appears well-developed and well-nourished.   HENT:   Head: Normocephalic.   Eyes: Pupils are equal, round, and reactive to light.   Neck: Normal range of motion. Neck supple. No JVD present. Carotid bruit is not present. No thyromegaly present.   Cardiovascular: Normal rate, regular rhythm, normal heart sounds, intact distal pulses and normal pulses.  PMI is not displaced.  Exam reveals no gallop.    No murmur heard.  Pulmonary/Chest: Effort normal and breath sounds normal.   Abdominal: Soft. Normal appearance. She exhibits no mass. There is no hepatosplenomegaly. There is no tenderness.   Musculoskeletal: Normal range of motion. She exhibits no edema.   Neurological: She is alert and oriented to person, place, and time. She has normal strength and normal reflexes. No sensory deficit.   Skin: Skin is warm and intact.   Psychiatric: She has a normal mood and affect.   Nursing note and vitals reviewed.        Assessment:       1. Benign hypertension with ESRD (end-stage renal disease)    2. Coronary artery disease involving native coronary artery of native heart without angina pectoris         Plan:   Increase coreg to 25 bid  Decrease diovan to 160 qd  Continue all cardiac medications  Regular exercise program  4 m f/u

## 2018-01-24 ENCOUNTER — LAB VISIT (OUTPATIENT)
Dept: LAB | Facility: HOSPITAL | Age: 43
End: 2018-01-24
Payer: COMMERCIAL

## 2018-01-24 DIAGNOSIS — Z76.82 AWAITING ORGAN TRANSPLANT: ICD-10-CM

## 2018-02-01 LAB — HPRA INTERPRETATION: NORMAL

## 2018-02-08 ENCOUNTER — OFFICE VISIT (OUTPATIENT)
Dept: OBSTETRICS AND GYNECOLOGY | Facility: CLINIC | Age: 43
End: 2018-02-08
Payer: COMMERCIAL

## 2018-02-08 ENCOUNTER — TELEPHONE (OUTPATIENT)
Dept: OBSTETRICS AND GYNECOLOGY | Facility: CLINIC | Age: 43
End: 2018-02-08

## 2018-02-08 VITALS
WEIGHT: 218.06 LBS | DIASTOLIC BLOOD PRESSURE: 60 MMHG | HEIGHT: 65 IN | BODY MASS INDEX: 36.33 KG/M2 | SYSTOLIC BLOOD PRESSURE: 166 MMHG

## 2018-02-08 DIAGNOSIS — R87.615 ENCOUNTER FOR REPEAT PAP SMEAR DUE TO PREVIOUS INSUFF CERVICAL CELLS: Primary | ICD-10-CM

## 2018-02-08 PROCEDURE — 99999 PR PBB SHADOW E&M-EST. PATIENT-LVL III: CPT | Mod: PBBFAC,,, | Performed by: NURSE PRACTITIONER

## 2018-02-08 PROCEDURE — 3008F BODY MASS INDEX DOCD: CPT | Mod: S$GLB,,, | Performed by: NURSE PRACTITIONER

## 2018-02-08 PROCEDURE — 88142 CYTOPATH C/V THIN LAYER: CPT

## 2018-02-08 PROCEDURE — 99213 OFFICE O/P EST LOW 20 MIN: CPT | Mod: S$GLB,,, | Performed by: NURSE PRACTITIONER

## 2018-02-08 PROCEDURE — 99213 OFFICE O/P EST LOW 20 MIN: CPT | Performed by: NURSE PRACTITIONER

## 2018-02-08 RX ORDER — CINACALCET 30 MG/1
60 TABLET, FILM COATED ORAL DAILY
COMMUNITY
End: 2019-03-12

## 2018-02-08 NOTE — PROGRESS NOTES
"CC: Repeat pap exam    Gilda Schroeder is a 42 y.o. female  presents for repeat pap exam secondary to insufficient cells.  LMP: Patient's last menstrual period was 01/15/2018..      Past Medical History:   Diagnosis Date    Allergy     morphine    Anemia     Anticoagulant long-term use     heparin    Diabetes mellitus, type 2     Disorder of kidney and ureter     ESRD (end stage renal disease) on dialysis     Gastroesophageal reflux disease without esophagitis 1/10/2018    Hypertension     Obesity     Peritoneal dialysis catheter in place     Peritonitis associated with peritoneal dialysis 2017     Past Surgical History:   Procedure Laterality Date     SECTION      heart stent  2017    PLACEMENT PERITONEAL DIALYSIS CATHETER      PORTACATH PLACEMENT      for dialysis; removed    SKIN GRAFT  10/2017    TUBAL LIGATION       Social History     Social History    Marital status:      Spouse name: N/A    Number of children: N/A    Years of education: N/A     Occupational History          unemployed     Social History Main Topics    Smoking status: Former Smoker     Packs/day: 0.50     Years: 9.00     Types: Cigarettes     Quit date: 2014    Smokeless tobacco: Former User    Alcohol use No    Drug use: No    Sexual activity: Yes     Partners: Male     Other Topics Concern    Not on file     Social History Narrative    No narrative on file     Family History   Problem Relation Age of Onset    Diabetes Mother     No Known Problems Father     No Known Problems Sister     No Known Problems Brother      OB History      Para Term  AB Living    4 4 4     4    SAB TAB Ectopic Multiple Live Births                       BP (!) 166/60 (BP Location: Right arm, Patient Position: Sitting, BP Method: Medium (Manual))   Ht 5' 5" (1.651 m)   Wt 98.9 kg (218 lb 0.6 oz)   LMP 01/15/2018   BMI 36.28 kg/m²       ROS:  GENERAL: Denies weight gain or " weight loss. Feeling well overall.   SKIN: Denies rash or lesions.   HEAD: Denies head injury or headache.   NODES: Denies enlarged lymph nodes.   CHEST: Denies chest pain or shortness of breath.   CARDIOVASCULAR: Denies palpitations or left sided chest pain.   ABDOMEN: No abdominal pain, constipation, diarrhea, nausea, vomiting or rectal bleeding.   URINARY: No frequency, dysuria, hematuria, or burning on urination.  REPRODUCTIVE: See HPI.       PHYSICAL EXAM:  APPEARANCE: Well nourished, well developed, in no acute distress.  PELVIC: Vagina moist and well rugated without lesions or discharge.  Cervix pink, without lesions, discharge or tenderness.      1. Encounter for repeat Pap smear due to previous insuff cervical cells  Liquid-based pap smear, screening    PLAN:  Repeat pap exam

## 2018-02-08 NOTE — TELEPHONE ENCOUNTER
----- Message from Lia Mujica sent at 2/8/2018  9:24 AM CST -----  Contact: Pt  Please call pt at [phone] (home)   Pt is running late will be 15 mins

## 2018-02-15 ENCOUNTER — TELEPHONE (OUTPATIENT)
Dept: OBSTETRICS AND GYNECOLOGY | Facility: CLINIC | Age: 43
End: 2018-02-15

## 2018-02-15 NOTE — TELEPHONE ENCOUNTER
----- Message from Kacey Rees NP sent at 2/15/2018  7:31 AM CST -----  Please call patient and inform  her that pap exam results are normal.

## 2018-03-01 ENCOUNTER — LAB VISIT (OUTPATIENT)
Dept: LAB | Facility: HOSPITAL | Age: 43
End: 2018-03-01
Attending: NURSE PRACTITIONER

## 2018-03-01 ENCOUNTER — OFFICE VISIT (OUTPATIENT)
Dept: OBSTETRICS AND GYNECOLOGY | Facility: CLINIC | Age: 43
End: 2018-03-01
Payer: COMMERCIAL

## 2018-03-01 VITALS
HEIGHT: 65 IN | SYSTOLIC BLOOD PRESSURE: 160 MMHG | DIASTOLIC BLOOD PRESSURE: 82 MMHG | WEIGHT: 213.88 LBS | BODY MASS INDEX: 35.63 KG/M2

## 2018-03-01 DIAGNOSIS — N92.1 MENORRHAGIA WITH IRREGULAR CYCLE: ICD-10-CM

## 2018-03-01 DIAGNOSIS — N92.1 MENORRHAGIA WITH IRREGULAR CYCLE: Primary | ICD-10-CM

## 2018-03-01 LAB
BASOPHILS # BLD AUTO: 0.07 K/UL
BASOPHILS NFR BLD: 0.8 %
DIFFERENTIAL METHOD: ABNORMAL
EOSINOPHIL # BLD AUTO: 0.4 K/UL
EOSINOPHIL NFR BLD: 4.6 %
ERYTHROCYTE [DISTWIDTH] IN BLOOD BY AUTOMATED COUNT: 15.9 %
HCT VFR BLD AUTO: 35 %
HGB BLD-MCNC: 11.1 G/DL
IMM GRANULOCYTES # BLD AUTO: 0.04 K/UL
IMM GRANULOCYTES NFR BLD AUTO: 0.5 %
INR PPP: 1
LYMPHOCYTES # BLD AUTO: 2 K/UL
LYMPHOCYTES NFR BLD: 24 %
MCH RBC QN AUTO: 29.5 PG
MCHC RBC AUTO-ENTMCNC: 31.7 G/DL
MCV RBC AUTO: 93 FL
MONOCYTES # BLD AUTO: 0.7 K/UL
MONOCYTES NFR BLD: 7.9 %
NEUTROPHILS # BLD AUTO: 5.3 K/UL
NEUTROPHILS NFR BLD: 62.2 %
NRBC BLD-RTO: 0 /100 WBC
PLATELET # BLD AUTO: 278 K/UL
PMV BLD AUTO: 9.8 FL
PROTHROMBIN TIME: 10.1 SEC
RBC # BLD AUTO: 3.76 M/UL
TSH SERPL DL<=0.005 MIU/L-ACNC: 1.72 UIU/ML
WBC # BLD AUTO: 8.45 K/UL

## 2018-03-01 PROCEDURE — 99213 OFFICE O/P EST LOW 20 MIN: CPT | Mod: S$GLB,,, | Performed by: NURSE PRACTITIONER

## 2018-03-01 PROCEDURE — 85025 COMPLETE CBC W/AUTO DIFF WBC: CPT | Mod: TXP

## 2018-03-01 PROCEDURE — 84443 ASSAY THYROID STIM HORMONE: CPT | Mod: NTX

## 2018-03-01 PROCEDURE — 85610 PROTHROMBIN TIME: CPT | Mod: TXP

## 2018-03-01 PROCEDURE — 36415 COLL VENOUS BLD VENIPUNCTURE: CPT | Mod: TXP

## 2018-03-01 PROCEDURE — 99999 PR PBB SHADOW E&M-EST. PATIENT-LVL IV: CPT | Mod: PBBFAC,,, | Performed by: NURSE PRACTITIONER

## 2018-03-01 NOTE — PROGRESS NOTES
"CC: Heavy bleeding    Gilda Schroeder is a 42 y.o. female  presents with c/o heavy vaginal bleeding. Patient reports that she has had irregular and heavy cycles for 2-3 months, no pelvic pain. Patient is on HD and " takes Plavix and Heparin". Last pap exam was normal, . Patient is s/p BTL.    Past Medical History:   Diagnosis Date    Allergy     morphine    Anemia     Anticoagulant long-term use     heparin    Diabetes mellitus, type 2     Disorder of kidney and ureter     ESRD (end stage renal disease) on dialysis     Gastroesophageal reflux disease without esophagitis 1/10/2018    Hypertension     Obesity     Peritoneal dialysis catheter in place     Peritonitis associated with peritoneal dialysis 2017     Past Surgical History:   Procedure Laterality Date     SECTION      heart stent  2017    PLACEMENT PERITONEAL DIALYSIS CATHETER      PORTACATH PLACEMENT      for dialysis; removed    SKIN GRAFT  10/2017    TUBAL LIGATION       Social History     Social History    Marital status:      Spouse name: N/A    Number of children: N/A    Years of education: N/A     Occupational History          unemployed     Social History Main Topics    Smoking status: Former Smoker     Packs/day: 0.50     Years: 9.00     Types: Cigarettes     Quit date: 2014    Smokeless tobacco: Former User    Alcohol use No    Drug use: No    Sexual activity: Yes     Partners: Male     Other Topics Concern    Not on file     Social History Narrative    No narrative on file     Family History   Problem Relation Age of Onset    Diabetes Mother     No Known Problems Father     No Known Problems Sister     No Known Problems Brother      OB History      Para Term  AB Living    4 4 3 1   4    SAB TAB Ectopic Multiple Live Births            1          BP (!) 160/82   Ht 5' 5" (1.651 m)   Wt 97 kg (213 lb 13.5 oz)   LMP 2018   BMI 35.59 kg/m² "       ROS:  GENERAL: Denies weight gain or weight loss. Feeling well overall.   SKIN: Denies rash or lesions.   HEAD: Denies head injury or headache.   NODES: Denies enlarged lymph nodes.   CHEST: Denies chest pain or shortness of breath.   CARDIOVASCULAR: Denies palpitations or left sided chest pain.   ABDOMEN: No abdominal pain, constipation, diarrhea, nausea, vomiting or rectal bleeding.   URINARY: No frequency, dysuria, hematuria, or burning on urination.  REPRODUCTIVE: See HPI.       PHYSICAL EXAM:  APPEARANCE: Obese AA female, in no acute distress.  CHEST: Good respiratory effect  ABDOMEN: Soft.  No tenderness or masses.  No hepatosplenomegaly.  No hernias.  BREASTS: Symmetrical, no skin changes or visible lesions.  No palpable masses, nipple discharge bilaterally.  PELVIC: On cycle, referred.    1. Menorrhagia with irregular cycle  CBC auto differential    TSH    PROTIME-INR    US Pelvis Complete Non OB    PLAN:  Labs  Pelvic Ultrasound  Referral for EMB

## 2018-03-02 ENCOUNTER — TELEPHONE (OUTPATIENT)
Dept: OBSTETRICS AND GYNECOLOGY | Facility: CLINIC | Age: 43
End: 2018-03-02

## 2018-03-02 NOTE — TELEPHONE ENCOUNTER
----- Message from Kacey Rees NP sent at 3/2/2018  8:03 AM CST -----  Please call patient and inform  her that labs are normal, slight anemia.

## 2018-03-08 ENCOUNTER — TELEPHONE (OUTPATIENT)
Dept: RADIOLOGY | Facility: HOSPITAL | Age: 43
End: 2018-03-08

## 2018-03-09 ENCOUNTER — HOSPITAL ENCOUNTER (OUTPATIENT)
Dept: RADIOLOGY | Facility: HOSPITAL | Age: 43
Discharge: HOME | End: 2018-03-09
Attending: NURSE PRACTITIONER
Payer: COMMERCIAL

## 2018-03-09 DIAGNOSIS — N92.1 MENORRHAGIA WITH IRREGULAR CYCLE: ICD-10-CM

## 2018-03-09 PROCEDURE — 76856 US EXAM PELVIC COMPLETE: CPT | Mod: 26,NTX,, | Performed by: RADIOLOGY

## 2018-03-09 PROCEDURE — 76830 TRANSVAGINAL US NON-OB: CPT | Mod: 26,NTX,, | Performed by: RADIOLOGY

## 2018-03-09 PROCEDURE — 76830 TRANSVAGINAL US NON-OB: CPT | Mod: TC,TXP

## 2018-03-11 NOTE — PROGRESS NOTES
Please call patient and inform her that pelvic ultrasound indicated Complex left ovarian cyst measuring 3.6 x 2.6 x 3.2 cm. She will review ultrasound results at her upcoming appt ( 03/13/2018 ) with . Please clarify with patient that she knows the time and place of her appt.

## 2018-03-12 ENCOUNTER — TELEPHONE (OUTPATIENT)
Dept: OBSTETRICS AND GYNECOLOGY | Facility: CLINIC | Age: 43
End: 2018-03-12

## 2018-03-12 PROBLEM — J81.0 ACUTE PULMONARY EDEMA: Status: RESOLVED | Noted: 2017-12-10 | Resolved: 2018-03-12

## 2018-03-12 NOTE — TELEPHONE ENCOUNTER
----- Message from Kacey Rees NP sent at 3/11/2018 10:15 AM CDT -----  Please call patient and inform her that pelvic ultrasound indicated Complex left ovarian cyst measuring 3.6 x 2.6 x 3.2 cm. She will review ultrasound results at her upcoming appt ( 03/13/2018 ) with . Please clarify with patient that she knows the time and place of her appt.

## 2018-03-13 ENCOUNTER — PROCEDURE VISIT (OUTPATIENT)
Dept: OBSTETRICS AND GYNECOLOGY | Facility: CLINIC | Age: 43
End: 2018-03-13
Payer: COMMERCIAL

## 2018-03-13 VITALS
DIASTOLIC BLOOD PRESSURE: 87 MMHG | SYSTOLIC BLOOD PRESSURE: 190 MMHG | RESPIRATION RATE: 16 BRPM | WEIGHT: 214.5 LBS | HEART RATE: 90 BPM | BODY MASS INDEX: 35.74 KG/M2 | HEIGHT: 65 IN

## 2018-03-13 DIAGNOSIS — N92.1 MENORRHAGIA WITH IRREGULAR CYCLE: Primary | ICD-10-CM

## 2018-03-13 DIAGNOSIS — N18.6 BENIGN HYPERTENSION WITH ESRD (END-STAGE RENAL DISEASE): ICD-10-CM

## 2018-03-13 DIAGNOSIS — I12.0 BENIGN HYPERTENSION WITH ESRD (END-STAGE RENAL DISEASE): ICD-10-CM

## 2018-03-13 PROCEDURE — 99212 OFFICE O/P EST SF 10 MIN: CPT | Mod: S$GLB,,, | Performed by: OBSTETRICS & GYNECOLOGY

## 2018-03-13 NOTE — PROGRESS NOTES
Subjective:       Patient ID: Gilda Schroeder is a 42 y.o. female.    Chief Complaint:  Endometrial Biopsy      History of Present Illness  HPI  Dysfunctional Uterine Bleeding  Patient complains of irregular menses. She had been bleeding irregularly. She is now bleeding every 4-8 weeks and menses are lasting 10 days. Dysmenorrhea:moderate, occurring throughout menses. Cyclic symptoms include: none. Current contraception: tubal ligation. History of infertility: no. History of abnormal Pap smear: no.  Pt was sent for EMB and discussion of results by NP.  Pt was unable to provider urine sample for UPT today (ESRD).      GYN & OB History  Patient's last menstrual period was 2018.   Date of Last Pap: 2/15/2018    OB History    Para Term  AB Living   4 4 3 1   4   SAB TAB Ectopic Multiple Live Births           1      # Outcome Date GA Lbr Julien/2nd Weight Sex Delivery Anes PTL Lv   4   34w0d   F CS-Unspec   SALLY   3 Term      CS-Unspec      2 Term      CS-Unspec      1 Term      Vag-Spont             Review of Systems  Review of Systems   Constitutional: Negative for activity change, appetite change, fatigue, fever and unexpected weight change.   Respiratory: Negative for shortness of breath.    Cardiovascular: Negative for chest pain.   Gastrointestinal: Negative for abdominal pain, bloating, blood in stool, constipation, diarrhea, nausea and vomiting.   Genitourinary: Positive for menorrhagia, menstrual problem and dysmenorrhea. Negative for vaginal bleeding, vaginal discharge, vaginal pain and vaginal odor.   Musculoskeletal: Negative for back pain.   Neurological: Negative for syncope and headaches.           Objective:    Physical Exam:   Constitutional: She is oriented to person, place, and time. She appears well-developed and well-nourished. No distress.                           Neurological: She is alert and oriented to person, place, and time.     Psychiatric: She has a normal  mood and affect. Her behavior is normal. Thought content normal.          Vitals:    03/13/18 0948   BP: (!) 190/87   Pulse: 90   Resp: 16       US pelvis:  Uterus:  Size: 9.6 x 4.7 x 5.1 cm  Masses: None punctate area of increased echotexture with distal shadowing consistent with calcification identified in the posterior mid body myometrium measuring 3.4 mm.  Endometrium: Normal in this pre menopausal patient, measuring 13 mm.  Several nabothian cysts incidentally noted.  Trace fluid identified tracking within the cervical canal.  Right ovary Size: 2.8 x 2.0 x 2.9 cm  Appearance: Area of decreased echotexture identified measuring 1.5 x 1.4 x 1.5 cm.  Etiology uncertain.  Vascular flow: Normal.  Left ovary: Size: 5.1 x 4.3 x 4.7 cm  Appearance: Complex cyst identified measuring 3.6 x 2.6 x 3.2 cm  Vascular Flow: Normal.  Free Fluid:  None.     Assessment:        1. Menorrhagia with irregular cycle    2. Benign hypertension with ESRD (end-stage renal disease)             Plan:      Menorrhagia with irregular cycle  -     Pt was counseled on AUB, including common signs and symptoms.  Symptoms are highly disruptive.  Pt is done with her fertility and has no desire for future childbearing.  Treatment options were reviewed with pt, including medical vs IUD vs ablation vs hysterectomy.  Pt voiced understanding and is most interested in Mirena or ablation.  However, pt will require endometrial sampling prior to either option.  -     Unable to proceed with EMB today secondary to severely elevated BP and inability to provide urine sample for UPT (pt already voided today and will be unable to provide more urine due to ESRD).  EMB today post-poned.    Benign hypertension with ESRD (end-stage renal disease)  -      BP today severely elevated despite taking medications this morning.  Pt reports long history of difficult control.  Pt advised to contact her PCP ASAP or report to ER for management.  Pt voiced  understanding.      Follow-up in about 4 weeks (around 4/10/2018) for EMB.

## 2018-03-13 NOTE — TELEPHONE ENCOUNTER
----- Message from Angelica Serrano sent at 3/12/2018  4:18 PM CDT -----  Contact: pt  The pt states she is returning a missed call, the pt can be reached at [phone]///thxMW

## 2018-03-19 PROCEDURE — 99001 SPECIMEN HANDLING PT-LAB: CPT | Mod: PO,TXP

## 2018-03-23 ENCOUNTER — LAB VISIT (OUTPATIENT)
Dept: LAB | Facility: HOSPITAL | Age: 43
End: 2018-03-23
Payer: COMMERCIAL

## 2018-03-23 DIAGNOSIS — Z76.82 AWAITING ORGAN TRANSPLANT: ICD-10-CM

## 2018-03-23 PROCEDURE — 99001 SPECIMEN HANDLING PT-LAB: CPT | Mod: PO,TXP

## 2018-05-10 ENCOUNTER — OFFICE VISIT (OUTPATIENT)
Dept: CARDIOLOGY | Facility: CLINIC | Age: 43
End: 2018-05-10
Payer: COMMERCIAL

## 2018-05-10 VITALS
DIASTOLIC BLOOD PRESSURE: 85 MMHG | HEIGHT: 65 IN | WEIGHT: 204.13 LBS | SYSTOLIC BLOOD PRESSURE: 162 MMHG | HEART RATE: 90 BPM | BODY MASS INDEX: 34.01 KG/M2

## 2018-05-10 DIAGNOSIS — I12.0 BENIGN HYPERTENSION WITH ESRD (END-STAGE RENAL DISEASE): Primary | ICD-10-CM

## 2018-05-10 DIAGNOSIS — N18.6 BENIGN HYPERTENSION WITH ESRD (END-STAGE RENAL DISEASE): Primary | ICD-10-CM

## 2018-05-10 DIAGNOSIS — I25.10 CORONARY ARTERY DISEASE INVOLVING NATIVE CORONARY ARTERY OF NATIVE HEART WITHOUT ANGINA PECTORIS: ICD-10-CM

## 2018-05-10 DIAGNOSIS — R00.2 PALPITATIONS: ICD-10-CM

## 2018-05-10 PROCEDURE — 99214 OFFICE O/P EST MOD 30 MIN: CPT | Mod: S$PBB,TXP,, | Performed by: INTERNAL MEDICINE

## 2018-05-10 PROCEDURE — 99999 PR PBB SHADOW E&M-EST. PATIENT-LVL II: CPT | Mod: PBBFAC,TXP,, | Performed by: INTERNAL MEDICINE

## 2018-05-10 NOTE — PROGRESS NOTES
Subjective:    Patient ID:  Gilda Schroeder is a 42 y.o. female who presents for follow-up of cad    HPI  She comes with no complaints, no chest pain, no shortness of breath      Review of Systems   Constitution: Negative for decreased appetite, weakness, malaise/fatigue, weight gain and weight loss.   Cardiovascular: Negative for chest pain, dyspnea on exertion, leg swelling, palpitations and syncope.   Respiratory: Negative for cough and shortness of breath.    Gastrointestinal: Negative.    All other systems reviewed and are negative.       Objective:    Physical Exam   Constitutional: She is oriented to person, place, and time. She appears well-developed and well-nourished.   HENT:   Head: Normocephalic.   Eyes: Pupils are equal, round, and reactive to light.   Neck: Normal range of motion. Neck supple. No JVD present. Carotid bruit is not present. No thyromegaly present.   Cardiovascular: Normal rate, regular rhythm, normal heart sounds, intact distal pulses and normal pulses.  PMI is not displaced.  Exam reveals no gallop.    No murmur heard.  Pulmonary/Chest: Effort normal and breath sounds normal.   Abdominal: Soft. Normal appearance. She exhibits no mass. There is no hepatosplenomegaly. There is no tenderness.   Musculoskeletal: Normal range of motion. She exhibits no edema.   Neurological: She is alert and oriented to person, place, and time. She has normal strength and normal reflexes. No sensory deficit.   Skin: Skin is warm and intact.   Psychiatric: She has a normal mood and affect.   Nursing note and vitals reviewed.        Assessment:       1. Benign hypertension with ESRD (end-stage renal disease)    2. Coronary artery disease involving native coronary artery of native heart without angina pectoris    3. Palpitations         Plan:     Continue all cardiac medications  Regular exercise program  Weight loss  6 m f/u with [facility]

## 2018-06-14 ENCOUNTER — TELEPHONE (OUTPATIENT)
Dept: CARDIOLOGY | Facility: CLINIC | Age: 43
End: 2018-06-14

## 2018-06-19 ENCOUNTER — TELEPHONE (OUTPATIENT)
Dept: CARDIOLOGY | Facility: CLINIC | Age: 43
End: 2018-06-19

## 2018-06-19 NOTE — TELEPHONE ENCOUNTER
Spoke with Jen she has been notified that Mrs. Schroeder is not a patient of Dr Brown. She verbalized understanding and stated she will call the patient to confirm.

## 2018-06-19 NOTE — TELEPHONE ENCOUNTER
----- Message from Lana Levine sent at 6/19/2018  2:55 PM CDT -----  Contact: Jen berrios/ Dr Tyesha Amaya  Type: Needs Medical Advice    Who Called:  Jen berrios/ Dr Tyesha Amaya  Symptoms (please be specific):  NA  How long has patient had these symptoms:  NA  Pharmacy name and phone #:  NA  Best Call Back Number:   Additional Information: Calling to follow up on the request for medical clearance that was faxed over on 6/13/18. Please advise.

## 2018-06-20 ENCOUNTER — TELEPHONE (OUTPATIENT)
Dept: CARDIOLOGY | Facility: CLINIC | Age: 43
End: 2018-06-20

## 2018-06-20 NOTE — TELEPHONE ENCOUNTER
----- Message from Dania Morocho sent at 6/20/2018  1:09 PM CDT -----  Contact: Jen with Dr Tyesha Amaya's office  Jen with Dr Tyesha Amaya's office calling regarding medical clearance. Jen states they received her medication and the conversation between Ms Linn and Dr Crowder. She states the completed medical clearance is still needed. Dr Amaya has advice that other patient have been advised to get off plavix 5 days prior to procedure and continue their 81 mg of aspirin. After procedure to return to taking the Plavix. Please fax to [phone]. Please call Jen if any questions at [phone]. Thanks!

## 2018-06-20 NOTE — TELEPHONE ENCOUNTER
----- Message from Frances England sent at 6/20/2018 10:52 AM CDT -----  Contact: nurse iL from Dr. Amaya Office   nurse Li from Dr. Amaya Office needing to nurse regarding medical clearance back     Please call  [phone]

## 2018-06-27 ENCOUNTER — TELEPHONE (OUTPATIENT)
Dept: CARDIOLOGY | Facility: CLINIC | Age: 43
End: 2018-06-27

## 2018-06-27 NOTE — TELEPHONE ENCOUNTER
----- Message from Hesham Wilkins sent at 6/27/2018 12:18 PM CDT -----  Contact: same  Patient called in and stated she just saw Dr. Brown in May and wanted to see if she could get a clearance for dental work?  Patient call back number is [phone]

## 2018-06-28 ENCOUNTER — TELEPHONE (OUTPATIENT)
Dept: CARDIOLOGY | Facility: CLINIC | Age: 43
End: 2018-06-28

## 2018-06-28 NOTE — TELEPHONE ENCOUNTER
----- Message from Janielexi Thompson sent at 6/28/2018 10:23 AM CDT -----  Patient states that she need the clearance for dental work faxed to the number on the paperwork that was faxed to you.  Please call patient when faxed in at [phone].

## 2018-07-05 ENCOUNTER — TELEPHONE (OUTPATIENT)
Dept: CARDIOLOGY | Facility: CLINIC | Age: 43
End: 2018-07-05

## 2018-07-05 NOTE — TELEPHONE ENCOUNTER
----- Message from Ellie Vincent sent at 7/5/2018  9:09 AM CDT -----  Contact: pt  Patient calling states needs surgical clearance for dental appt.  Was told paperwork needs to be signed and sent back to dental office.  .[phone] (home)

## 2018-07-10 ENCOUNTER — PATIENT MESSAGE (OUTPATIENT)
Dept: ADMINISTRATIVE | Facility: OTHER | Age: 43
End: 2018-07-10

## 2018-07-10 ENCOUNTER — TELEPHONE (OUTPATIENT)
Dept: CARDIOLOGY | Facility: CLINIC | Age: 43
End: 2018-07-10

## 2018-07-10 NOTE — TELEPHONE ENCOUNTER
Patient has been setup for patient portal she has been emailed a copy of her clearance Jen with the doc marion said it was ok for the patient to email them a copy

## 2018-07-10 NOTE — TELEPHONE ENCOUNTER
----- Message from Hesham FAYE Franky sent at 7/10/2018  2:27 PM CDT -----  Contact: same  Patient called in and stated she saw Dr. Brown on 5/10/18 and has to have a tooth pulled.  Patient stated her dentist office, Dr. Amaya, sent over a clearance back on 7/5/18 to the attention of Mariela but they never received it back.    Patient would like a call back at [phone]

## 2018-07-25 DIAGNOSIS — Z76.82 ORGAN TRANSPLANT CANDIDATE: Primary | ICD-10-CM

## 2018-08-28 RX ORDER — CARVEDILOL 25 MG/1
TABLET ORAL
Qty: 60 TABLET | Refills: 5 | Status: ON HOLD | OUTPATIENT
Start: 2018-08-28 | End: 2019-06-12 | Stop reason: HOSPADM

## 2018-08-31 PROBLEM — K31.84 GASTROPARESIS: Status: ACTIVE | Noted: 2018-08-31

## 2018-08-31 PROBLEM — I16.0 HYPERTENSIVE URGENCY: Status: ACTIVE | Noted: 2018-08-31

## 2018-08-31 PROBLEM — R10.13 EPIGASTRIC PAIN: Status: ACTIVE | Noted: 2018-08-31

## 2018-09-04 ENCOUNTER — TELEPHONE (OUTPATIENT)
Dept: TRANSPLANT | Facility: CLINIC | Age: 43
End: 2018-09-04

## 2018-09-04 NOTE — TELEPHONE ENCOUNTER
LM for pt to return call regarding HLA lab draws. Last draw 3/19/18. Pt does home hemo DU updated in Epic.

## 2018-09-04 NOTE — PROGRESS NOTES
Spoke w/pt, confirmed to have HLA kits sent to [facility]. Pt will report there once per month to have labs drawn.

## 2018-09-06 ENCOUNTER — HOSPITAL ENCOUNTER (OUTPATIENT)
Dept: RADIOLOGY | Facility: HOSPITAL | Age: 43
Discharge: HOME | End: 2018-09-06
Attending: NURSE PRACTITIONER
Payer: COMMERCIAL

## 2018-09-06 ENCOUNTER — OFFICE VISIT (OUTPATIENT)
Dept: TRANSPLANT | Facility: CLINIC | Age: 43
End: 2018-09-06
Payer: MEDICARE

## 2018-09-06 VITALS
WEIGHT: 189.38 LBS | RESPIRATION RATE: 18 BRPM | HEART RATE: 86 BPM | DIASTOLIC BLOOD PRESSURE: 71 MMHG | OXYGEN SATURATION: 95 % | BODY MASS INDEX: 32.33 KG/M2 | HEIGHT: 64 IN | TEMPERATURE: 99 F | SYSTOLIC BLOOD PRESSURE: 151 MMHG

## 2018-09-06 DIAGNOSIS — N18.6 ESRD (END STAGE RENAL DISEASE): ICD-10-CM

## 2018-09-06 DIAGNOSIS — Z76.82 PATIENT ON WAITING LIST FOR KIDNEY TRANSPLANT: Primary | ICD-10-CM

## 2018-09-06 DIAGNOSIS — Z76.82 ORGAN TRANSPLANT CANDIDATE: ICD-10-CM

## 2018-09-06 DIAGNOSIS — N18.6 BENIGN HYPERTENSION WITH ESRD (END-STAGE RENAL DISEASE): ICD-10-CM

## 2018-09-06 DIAGNOSIS — I25.10 CORONARY ARTERY DISEASE INVOLVING NATIVE CORONARY ARTERY OF NATIVE HEART WITHOUT ANGINA PECTORIS: ICD-10-CM

## 2018-09-06 DIAGNOSIS — Z79.01 ANTICOAGULANT LONG-TERM USE: ICD-10-CM

## 2018-09-06 DIAGNOSIS — R07.9 CHEST PAIN, UNSPECIFIED TYPE: ICD-10-CM

## 2018-09-06 DIAGNOSIS — I12.0 BENIGN HYPERTENSION WITH ESRD (END-STAGE RENAL DISEASE): ICD-10-CM

## 2018-09-06 PROCEDURE — 76770 US EXAM ABDO BACK WALL COMP: CPT | Mod: TC,TXP

## 2018-09-06 PROCEDURE — 99999 PR PBB SHADOW E&M-EST. PATIENT-LVL V: CPT | Mod: PBBFAC,TXP,, | Performed by: NURSE PRACTITIONER

## 2018-09-06 PROCEDURE — 76770 US EXAM ABDO BACK WALL COMP: CPT | Mod: 26,TXP,, | Performed by: RADIOLOGY

## 2018-09-06 PROCEDURE — 99215 OFFICE O/P EST HI 40 MIN: CPT | Mod: S$PBB,TXP,, | Performed by: NURSE PRACTITIONER

## 2018-09-06 NOTE — LETTER
September 10, 2018        Roselia Rose  [address] [address] [address]  Phone: [phone]  Fax: [phone]             [facility]  [address]  [facility] [address]  Phone: [phone]   Patient: Gilda Schroeder   MR Number: [id number]   YOB: 1975   Date of Visit: 9/6/2018       Dear Dr. Roselia Rose    Thank you for referring Gilda Schroeder to me for evaluation. Attached you will find relevant portions of my assessment and plan of care.    If you have questions, please do not hesitate to call me. I look forward to following Gilda Schroeder along with you.    Sincerely,    Sonia Mckeon, NP    Enclosure    If you would like to receive this communication electronically, please contact [email or web address] or [phone] to request [facility] Link access.    [facility] Link is a tool which provides read-only access to select patient information with whom you have a relationship. Its easy to use and provides real time access to review your patients record including encounter summaries, notes, results, and demographic information.    If you feel you have received this communication in error or would no longer like to receive these types of communications, please e-mail [email or web address]

## 2018-09-06 NOTE — PROGRESS NOTES
"   Kidney Transplant Recipient Reevalulation    Referring Physician: Roselia Rose  Current Nephrologist: Roselia Rose  Waitlist Status: active  Dialysis Start Date: 1/17/2018    Subjective:     CC:  Annual reassessment of kidney transplant candidacy.     HPI:  Ms. Jovani Schroeder is a 42 y.o. year old Black or  female with ESRD secondary to diabetic nephropathy and HTN.  She has been on the wait list for a kidney transplant at [facility] since 3/31/2016. Patient was initially on PD starting on 3/31/2017. She  started to have a peritonitis infection on 7/24/2017. She then  completed 3 courses of antibiotics, which resulted in a yeast infection. The PD catheter was pulled,  and she started HD on  8/21/2017 . Patient is now dialyzing on Home HD, 3 x per week for 2 hours and 19 minutes per session.    Patient reports that she is tolerating dialysis well. She has a Left upper extremity AV fistula.      Recent hospitalizations or ED visits on 8/31/2018 for epigastric pain, N/V. S/p EGD  Pt reports N/V occurrs "off and on" for the p[ast few months. She reports tolerating meds. She states she has gastroparesis and is suppose to f/u with GI.     9/6/2018 Renal US  Impression     1.  Chronic bilateral medical renal disease.  2.  Bilateral simple renal cysts       8/30/2018 ABD US   IMPRESSION  No echogenic obstructive calculus, biliary dilatation or hydronephrosis is appreciated.  No free fluid collections are appreciated.  The findings are overall concordant with [facility].    1/11/2018 2 D Echo  CONCLUSIONS     1 - Low normal left ventricular systolic function (EF 50-55%).     2 - Indeterminate LV diastolic function.     3 - Concentric hypertrophy.     4 - Trivial to mild mitral regurgitation      Past Medical History:   Diagnosis Date    Allergy     morphine    Anemia     Anticoagulant long-term use     heparin    Diabetes mellitus with ESRD (end-stage renal disease) 7/5/2016    Diabetes mellitus, " "type 2     Disorder of kidney and ureter     ESRD (end stage renal disease) on dialysis     Gastroesophageal reflux disease without esophagitis 1/10/2018    Hypertension     Obesity     Peritoneal dialysis catheter in place     Peritonitis associated with peritoneal dialysis 08/2017       Review of Systems   Constitutional: Negative for activity change, appetite change, chills, fatigue, fever and unexpected weight change.   HENT: Negative for congestion, facial swelling, postnasal drip, rhinorrhea, sinus pressure, sore throat and trouble swallowing.    Eyes: Negative for pain, redness and visual disturbance.   Respiratory: Negative for cough, chest tightness, shortness of breath and wheezing.    Cardiovascular: Negative.  Negative for chest pain, palpitations and leg swelling.   Gastrointestinal: Positive for nausea and vomiting. Negative for abdominal pain and diarrhea.        Intermittent , HX gastroparesis    Genitourinary: Negative for dysuria, flank pain and urgency.   Musculoskeletal: Negative for gait problem, neck pain and neck stiffness.   Skin: Negative for rash.   Allergic/Immunologic: Negative for environmental allergies, food allergies and immunocompromised state.   Neurological: Negative for dizziness, weakness, light-headedness and headaches.   Psychiatric/Behavioral: Negative for agitation and confusion. The patient is not nervous/anxious.        Objective:   body mass index is 32.17 kg/m².  BP (!) 151/71 (BP Location: Right arm, Patient Position: Sitting, BP Method: Large (Automatic))   Pulse 86   Temp 98.9 °F (37.2 °C) (Oral)   Resp 18   Ht 5' 4.33" (1.634 m)   Wt 85.9 kg (189 lb 6 oz)   SpO2 95%   BMI 32.17 kg/m²     Physical Exam   Constitutional: She is oriented to person, place, and time. She appears well-developed and well-nourished.   HENT:   Head: Normocephalic.   Mouth/Throat: Oropharynx is clear and moist. No oropharyngeal exudate.   Eyes: Conjunctivae and EOM are normal. " Pupils are equal, round, and reactive to light. No scleral icterus.   Neck: Normal range of motion. Neck supple.   Cardiovascular: Normal rate, regular rhythm and normal heart sounds.   Pulmonary/Chest: Effort normal and breath sounds normal.   Abdominal: Soft. Normal appearance and bowel sounds are normal. She exhibits no distension and no mass. There is no splenomegaly or hepatomegaly. There is no tenderness. There is no rebound, no guarding, no CVA tenderness, no tenderness at McBurney's point and negative Jenkins's sign.   Musculoskeletal: Normal range of motion. She exhibits no edema.        Arms:  Lymphadenopathy:     She has no cervical adenopathy.   Neurological: She is alert and oriented to person, place, and time. She exhibits normal muscle tone. Coordination normal.   Skin: Skin is warm and dry.   Psychiatric: She has a normal mood and affect. Her behavior is normal.   Vitals reviewed.      Labs:  Lab Results   Component Value Date    WBC 9.03 08/30/2018    HGB 9.9 (L) 08/30/2018    HCT 30.1 (L) 08/30/2018     08/30/2018    K 4.2 08/30/2018    CL 90 (L) 08/30/2018    CO2 29 08/30/2018    BUN 49 (H) 08/30/2018    CREATININE 13.90 (H) 08/30/2018    EGFRNONAA 3 (A) 08/30/2018    CALCIUM 9.4 08/30/2018    PHOS 6.2 (H) 01/12/2018    PHOS 6.3 (H) 01/12/2018    MG 2.2 01/12/2018    ALBUMIN 4.5 08/30/2018    AST 24 08/30/2018    ALT 25 08/30/2018    .0 (H) 07/19/2016       Lab Results   Component Value Date    LIPASE 100 01/10/2018       No results found for: HLAABCTYPE    Lab Results   Component Value Date    CPRA 98 01/15/2018    VI8GEJP  01/15/2018     B49,B51,B52,B63,B59,B77,A32,B38,B13,B53,B44,A25,B76,B50,B61,B60,B41,A31,B47,B45,B37,A2,A33,A74,A80,B58    [facility] A*30:01--WEAK CW9,CW10,CW8,B57,B46,CW1,B73 01/15/2018    CIIAB DR53,DR15,DR51,DR16,,DR1,DR9,DR7,DR4,DR10,DQ2 01/15/2018    ABCMT DRB1*14:02--WEAK DQA1*06:01,DQA1*05:03,DQA1*05:05 01/15/2018       Labs were reviewed with the  patient.    Pre-transplant Workup:   Reviewed with the patient.    Assessment:     1. Patient on waiting list for kidney transplant    2. Benign hypertension with ESRD (end-stage renal disease)    3. Coronary artery disease involving native coronary artery of native heart without angina pectoris    4. Chest pain, unspecified type    5. ESRD (end stage renal disease)    6. BMI 35.0-35.9,adult        Plan:   MMG scheduled 9/25/2018  F/u with GI as scheduled      Transplant Candidacy:   Ms. Jovani Schroeder is a suitable kidney transplant candidate.  She remains in overall stable health, and will remain active on the transplant list.    Sonia Mckeon NP       Follow-up:   In addition to the tests noted in the plan, Ms. Jovani Schroeder will continue to have reevaluation as per the standing pre-kidney transplant protocol:  1. Monthly blood for PRA  2. Annual return to clinic, except HIV positive, > 65 years of age, or pancreas transplant candidates who will be scheduled to see transplant every 6 months while in pre-transplant phase  3. Annual re-testing: CXR, EKG, yearly mammograms for women over 40 and PSA for males over 40, cardiology follow-up as recommended by initial cardiology pre-transplant evaluation  4. Renal ultrasound every 2 years  5. Baseline colonoscopy after age 50 and repeated as recommended    UNOS Patient Status  Functional Status: 60% - Requires occasional assistance but is able to care for needs  Physical Capacity: No Limitations

## 2018-09-06 NOTE — PROGRESS NOTES
Patient was seen in listed '[facility]' transplant clinic for continued evaluation for kidney, kidney/pancreas or pancreas only transplant. The patient attended a group education session that discussed/reviewed the following aspects of transplantation: evaluation and selection committee process, UNOS waitlist management/multiple listings, types of organs offered (KDPI < 85%, KDPI > 85%, PHS increased risk, DCD), financial aspects, surgical procedures, dietary instruction pre- and post-transplant, health maintenance pre- and post-transplant, post-transplant hospitalization and outpatient follow-up, potential to participate in a research protocol, and medication management and side effects. A question and answer session was provided after the presentation.     The patient was seen by all members of the multi-disciplinary team to include: Nephrologist/PA, , Transplant Coordinator, and .      The patient reviewed and signed all consents for evaluation which were witnessed and sent to scanning into the EPIC chart.     The patient was given an education book and plan for further evaluation based on her individual assessment.      The patient was encouraged to call with any questions or concerns.

## 2018-09-06 NOTE — LETTER
Date: 9/10/2018          Listed Patient      To: Dialysis Unit  and Charge RN From: JARED Beauchamp     RE: Gilda Lucas Fajardo Alexandre, 1975, [id number]     At [facility], we conduct adherence checks as an important part of transplant care. Initial and listed patient assessments are not complete without adherence information.        Please complete the following information:     Current Dry Weight: ___________         Most Recent Pre-Treatment Weight: ___________ /date: _________                    Data from the last 3 months:  (data from last 3 months preferred):    Number of AMAs with dates, time, and reasons: ____________________________________________________    ______________________________________________________________________________________________    ______________________________________________________________________________________________    Number of No-Shows with dates and reasons: ______________________________________________________      ______________________________________________________________________________________________    Last intact PTH:  ___________/date: __________      Any concerns with Labs:  YES / NO      If yes, please explain:  ___________________________________________________________________________    ______________________________________________________________________________________________    Any concerns with Caregivers:  YES / NO    If yes, please explain:  ___________________________________________________________________________    ______________________________________________________________________________________________     Any concerns with Transportation:  YES / NO    If yes, please explain:  ___________________________________________________________________________    ______________________________________________________________________________________________    Any Psychiatric and/or Psychosocial  concerns:  YES / NO     If yes, please explain: ___________________________________________________________________________    ______________________________________________________________________________________________      PLEASE RETURN TO: FAX: [phone]     Thank you for collaborating with us in the care of this patient.           [address]  ?  [place] [address]  ?  phone [phone]  ?  fax [phone]  ?  [email or web address]  Confidentiality notice: The accompanying facsimile is intended solely for the use of the recipient designated above. Document(s) transmitted herewith may contain information that is confidential and privileged. Delivery, distribution or dissemination of this communication other than to the intended recipient is strictly prohibited. If you have received this facsimile in error, please notify us immediately by telephone.

## 2018-09-06 NOTE — PROGRESS NOTES
LISTED PATIENT EDUCATION NOTE    Ms. Gilda Schroeder was seen in pre-kidney transplant clinic for evaluation for kidney, kidney/pancreas or pancreas only transplant.  The patient attended a group education session that discussed/reviewed the following aspects of transplantation: evaluation and selection committee process, UNOS waitlist management/multiple listings, types of organs offered (KDPI < 85%, KDPI > 85%, PHS increased risk, DCD), financial aspects, surgical procedures, dietary instruction pre- and post-transplant, health maintenance pre- and post-transplant, post-transplant hospitalization and outpatient follow-up, potential to participate in a research protocol, and medication management and side effects.  A question and answer session was provided after the presentation.    The patient was seen by all members of the multi-disciplinary team to include: Nephrologist/PA, Surgeon, , Transplant Coordinator, , Pharmacist and Dietician (if applicable).    The patient reviewed and signed all consents for evaluation which were witnessed and sent to scanning into the EPIC chart.    The patient was given an education book and plan for further evaluation based on her individual assessment.      The patient was encouraged to call with any questions or concerns.

## 2018-09-07 ENCOUNTER — TELEPHONE (OUTPATIENT)
Dept: TRANSPLANT | Facility: CLINIC | Age: 43
End: 2018-09-07

## 2018-09-07 NOTE — TELEPHONE ENCOUNTER
----- Message from Anisha Tyson sent at 9/7/2018  7:18 AM CDT -----  Regarding: Internal hold request / Clinicals needed  Candy,   Patient has new insurance and listing approval is needed. Can you please place patient on internal hold. I will also need updated clinicals to submit to new insurance for listing approval.     Thanks

## 2018-09-10 NOTE — PROGRESS NOTES
Patient's chart reviewed today. Patient due for annual follow-up in September 2019. Diagnostics ordered as per protocol. HLA sample past due, with most recent sample received in 3/2018. Spoke w/pt & GIA nurse, GIA updated in Epic. Pt is aware that HLA lab samples are needed monthly.

## 2018-09-10 NOTE — PROGRESS NOTES
Transplant Recipient Adult Psychosocial Assessment Update    Gilda Schroeder  [address] [place] [address]    Telephone Information:   Mobile [phone]   Home  [phone] (home)  Work  There is no work phone number on file.  E-mail  [email or web address]    Sex: female  YOB: 1975  Age: 43 y.o.    Encounter Date: 9/6/2018  U[place] Citizen: yes  Primary Language: English   Needed: no    Emergency Contact:  Name: Kenrick Schroeder  Relationship:   Address: same as pt  Phone Numbers:  [phone]    Family/Social Support:   Number of dependents/: 2 minor children (Mari and Jen) who will be taken care of by pt's adult children or pt's sister Rm and/or brother.  Marital history: pt reports being  to pt's  for the past 16 years.    Other family dynamics: Pt reports parents are living.  Very complicated family of origin history.  Pt reports having 4 children:  Jeane Lemus, age 23, does drive, lives in [place], [place]; Denis Hahn, age 21, lives with pt; Mari Schroeder, age 17, lives with pt; Jen Schroeder, age 11, lives with pt.  Pt reports having 1 supportive sister and 1 brother  (not part of pt's life, frequently in trouble with the law). Pt also discussed having a very supportive Mu-ism Family as well.    Household Composition:  Name: Kenrick Schroeder  Age: 44  Relationship:   Does person drive? yes    Name: Allenlance Schroeder  Age: 17  Relationship: daughter  Does person drive? yes     Name: Jen Schroeder  Age: 11  Relationship: daughter  Does person drive? Yes    Do you and your caregivers have access to reliable transportation? yes  PRIMARY CAREGIVER: Kenrick pt's , will be primary caregiver, phone number [phone].      provided in-depth information to patient and caregiver regarding pre- and post-transplant caregiver role.   strongly encourages patient and caregiver to have concrete plan regarding  post-transplant care giving, including back-up caregiver(s) to ensure care giving needs are met as needed.    Patient and Caregiver states understanding all aspects of caregiver role/commitment and is able/willing/committed to being caregiver to the fullest extent necessary.    Patient and Caregiver verbalizes understanding of the education provided today and caregiver responsibilities.         remains available. Patient and Caregiver agree to contact  in a timely manner if concerns arise.      Able to take time off work without financial concerns: yes.     Additional Significant Others who will Assist with Transplant:  Name: Rm Flores  Age: 36 (cher)  City: [place] State: [place]  Relationship: sister  Does person drive? yes ; [phone]    Name: Sadaf So  Age: 43  City: [place] State: [place]  Relationship: cousin   Does person drive? yes ; phone [phone]     Name: Jeane Michele  Age: 23  City: [place] State: [place]  Relationship: dtr   Does person drive? yes ; phone [phone]      Living Will: no  Healthcare Power of : no  Advance Directives on file: <<no information> per medical record. Pt reports trusting  with medical needs  Verbally reviewed LW/HCPA information.   provided patient with copy of LW/HCPA documents and provided education on completion of forms.    Living Donors:   No. Education and resource information given to patient.        Highest Education Level: Attended College/Technical School  Reading Ability: college  Reports difficulty with: seeing and wears glasses  Learns Best By:  a combination of verbal, written, and hands-on instructions.     Status: no  VA Benefits: no     Working for Income: No  If no, reason not working: Disability  Patient is disabled.  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    Spouse/Significant Other Employment: pt's  reports working fulltime as  a [employer] in the [employer] and reports can take off of work as needed.    Disabled: yes: date disability began: 2016, due to: ESRD.    Monthly Income:  SS Disability: $218  Other household members total: $4000  Able to afford all costs now and if transplanted, including medications: yes  Patient and Caregiver verbalizes understanding of personal responsibilities related to transplant costs and the importance of having a financial plan to ensure that patients transplant costs are fully covered.       provided fundraising information/education. Patient and Caregiververbalizes understanding.   remains available.    Insurance:   Payor/Plan Subscr  Sex Relation Sub. Ins. ID Effective Group Num   1. CIGNA - CIGNA* YEISON SINGER 1975 Female  [id number]                                   [address], [place] [address]   2. CIGNA - CIGNA* ROSA ALVA 1973 Male  [id number]                                   [address]     Primary Insurance (for UNOS reporting): Private Insurance via pt's 's employer.  SW provided education regarding premium and disability limitations when solely based on ESRD.  Pt verbalizes understanding and reports pt's SW at dialysis is currently working to have pt participate in this arthur.  SW remains available.    Secondary Insurance (for UNOS reporting): None     Patient and Caregiver verbalizes clear understanding that patient may experience difficulty obtaining and/or be denied insurance coverage post-surgery. This includes and is not limited to disability insurance, life insurance, health insurance, burial insurance, long term care insurance, and other insurances.      Patient and Caregiver also reports understanding that future health concerns related to or unrelated to transplantation may not be covered by patient's insurance.  Resources and information provided and reviewed.     Patient  and Caregiver provides verbal permission to release any necessary information to outside resources for patient care and discharge planning.  Resources and information provided are reviewed.      Dialysis Adherence: Patient reports adherence with all aspects of plan or care, including medications, appointments, diet, and dialysis.  faxed adherence form.     Infusion Service: patient utilizing? no  Home Health: patient utilizing? no  DME: no  Pulmonary/Cardiac Rehab: denies   ADLS:  Pt reports pt is independent with all ADLS including driving, bathing, walking, taking medications, cooking, housekeeping, eating, and shopping.    Adherence:    Pt states current and expected adherence with all healthcare recommendations.  Adherence education and counseling provided.     Per History Section:  Past Medical History:   Diagnosis Date    Allergy     morphine    Anemia     Anticoagulant long-term use     heparin    Diabetes mellitus with ESRD (end-stage renal disease) 7/5/2016    Diabetes mellitus, type 2     Disorder of kidney and ureter     ESRD (end stage renal disease) on dialysis     Gastroesophageal reflux disease without esophagitis 1/10/2018    Hypertension     Obesity     Peritoneal dialysis catheter in place     Peritonitis associated with peritoneal dialysis 08/2017     Social History - per psychosocial   Substance Use Topics    Smoking status: Former Smoker     Packs/day: 0.50     Types: Cigarettes     Quit date: 7/1/2014    Smokeless tobacco: Not on file    Alcohol use No      Comment: rare social drinker     Social History     Substance and Sexual Activity   Drug Use No       Patient and Caregiver states clear understanding of the potential impact of substance use as it relates to transplant candidacy and is aware of possible random substance screening.  Substance abstinence/cessation counseling, education and resources provided and reviewed.     Arrests/DWI/Treatment/Rehab: patient  denies    Psychiatric History:    Mental Health: Patient denies having current and/or past depression, emotional concerns, mental health diagnosis and anxiety at this time. Pt stated she has received regular counseling from her , Thomas Pompa at [facility]. She now gets counseling as needed.   Medications:  Patient denies patient is taking medication(s) for mental health issues at this time.      Suicide/Homicide Issues: Pt denies any current and/or past SI/HI at this time.   Safety at home: Pt denies any current and/or past DV concerns at this time.  Pt reports feeling safe in pt's home environment.      Knowledge: Patient and Caregiver states having clear understanding and realistic expectations regarding the potential risks and potential benefits of organ transplantation and organ donation and agrees to discuss with health care team members and support system members, as well as to utilize available resources and express questions and/or concerns in order to further facilitate the pt informed decision-making.  Resources and information provided and reviewed.    Patient and Caregiver is aware of [facility]'s affiliation and/or partnership with agencies in home health care, LTAC, SNF, [facility], and other hospitals and clinics.    Understanding: Patient and Caregiver reports having a clear understanding of the many lifetime commitments involved with being a transplant recipient, including costs, compliance, medications, lab work, procedures, appointments, concrete and financial planning, preparedness, timely and appropriate communication of concerns, abstinence (ETOH, tobacco, illicit non-prescribed drugs), adherence to all health care team recommendations, support system and caregiver involvement, appropriate and timely resource utilization and follow-through, mental health counseling as needed/recommended, and patient and caregiver responsibilities.  Social Service Handbook, resources and detailed  educational information provided and reviewed.  Educational information provided.    Patient and Caregiver also reports current and expected compliance with health care regime and states having a clear understanding of the importance of compliance.      Patient and Caregiver reports a clear understanding that risks and benefits may be involved with organ transplantation and with organ donation.       Patient and Caregiver also reports clear understanding that psychosocial risk factors may affect patient, and include but are not limited to feelings of depression, generalized anxiety, anxiety regarding dependence on others, post traumatic stress disorder, feelings of guilt and other emotional and/or mental concerns, and/or exacerbation of existing mental health concerns.  Detailed resources provided and discussed.      Patient and Caregiver agrees to access appropriate resources in a timely manner as needed and/or as recommended, and to communicate concerns appropriately.  Patient and Caregiver also reports a clear understanding of treatment options available.     Patient and Caregiver received education in a group setting.   reviewed education, provided additional information, and answered questions.    Feelings or Concerns: Pt denies having any concerns and/or overwhelming feelings regarding transplant at this time. Pt reports high motivation to pursue organ transplant at this time.    Coping: Patient reports that she is coping with the work-up process.  Patient reports utilizing family members, friends, engaging in hobbies: social media, reading, and watching tv, prayer and spiritual support: active member of [employer] as patient's effective coping strategies.  Pt denies need for additional assistance at this time. Pt agrees to call transplant team as needed. SW remains available.       Goals: Pt reports a goal of ending dialysis and returning to work. Patient referred to Vocational  "Rehabilitation. SW provided support and education. Pt verbalizes understanding that transplant is not a guarantee of ending dialysis and life after transplantation will be a "new normal" post transplant.  SW remains available.    Interview Behavior: Patient and Caregivers presents as alert and oriented x 4, pleasant, good eye contact, calm, communicative, cooperative and asking and answering questions appropriately. Pt presented with  and dtr who appeared to be supportive of pt's pursuit of transplant.         Transplant Social Work - Candidacy  Assessment/Plan:     Psychosocial Suitability: Patient presents as an acceptable candidate for kidney transplant at this time. Based on psychosocial risk factors, patient presents as low risk, due to pt's stable financial plan, pt's good adherence, pt coping appropriately with the work-up process, and pt's confirmed caregiver plan.       Recommendations/Additional Comments:  counseled patient and caregivers extensively on fundraising, housing, transportation and caregiver plan.   recommends fundraising.  [facility]   Patient verbalizes understanding.   remains available.      SCOTT Dawson, LMSW       "

## 2018-10-10 DIAGNOSIS — Z76.82 ORGAN TRANSPLANT CANDIDATE: ICD-10-CM

## 2018-10-13 PROBLEM — N18.6 ESRD (END STAGE RENAL DISEASE): Status: ACTIVE | Noted: 2018-10-13

## 2018-10-13 PROBLEM — K52.9 GASTROENTERITIS: Status: ACTIVE | Noted: 2018-10-13

## 2018-10-15 PROBLEM — E87.20 METABOLIC ACIDOSIS: Status: ACTIVE | Noted: 2018-10-15

## 2018-10-15 PROBLEM — K31.84 GASTROPARESIS: Status: ACTIVE | Noted: 2018-10-13

## 2018-11-05 ENCOUNTER — LAB VISIT (OUTPATIENT)
Dept: LAB | Facility: HOSPITAL | Age: 43
End: 2018-11-05
Payer: COMMERCIAL

## 2018-11-05 DIAGNOSIS — Z76.82 ORGAN TRANSPLANT CANDIDATE: ICD-10-CM

## 2018-11-05 PROCEDURE — 86833 HLA CLASS II HIGH DEFIN QUAL: CPT | Mod: PO,TXP

## 2018-11-05 PROCEDURE — 86832 HLA CLASS I HIGH DEFIN QUAL: CPT | Mod: PO,TXP

## 2018-11-12 LAB — HPRA INTERPRETATION: NORMAL

## 2018-11-16 LAB
CLASS I ANTIBODIES - LUMINEX: NORMAL
CLASS I ANTIBODY COMMENTS - LUMINEX: NORMAL
CLASS II ANTIBODIES - LUMINEX: NORMAL
CLASS II ANTIBODY COMMENTS - LUMINEX: NORMAL
CPRA %: 95
SERUM COLLECTION DT - LUMINEX CLASS I: NORMAL
SERUM COLLECTION DT - LUMINEX CLASS II: NORMAL
SPCL1 TESTING DATE: NORMAL
SPCL2 TESTING DATE: NORMAL
SPCLU TESTING DATE: NORMAL

## 2018-11-19 ENCOUNTER — LAB VISIT (OUTPATIENT)
Dept: LAB | Facility: HOSPITAL | Age: 43
End: 2018-11-19
Payer: COMMERCIAL

## 2018-11-19 DIAGNOSIS — Z76.82 ORGAN TRANSPLANT CANDIDATE: ICD-10-CM

## 2018-11-19 PROCEDURE — 99001 SPECIMEN HANDLING PT-LAB: CPT | Mod: PO,TXP

## 2019-01-09 ENCOUNTER — LAB VISIT (OUTPATIENT)
Dept: LAB | Facility: HOSPITAL | Age: 44
End: 2019-01-09
Payer: COMMERCIAL

## 2019-01-09 DIAGNOSIS — Z76.82 ORGAN TRANSPLANT CANDIDATE: ICD-10-CM

## 2019-01-09 PROCEDURE — 86832 HLA CLASS I HIGH DEFIN QUAL: CPT | Mod: PO,TXP

## 2019-01-09 PROCEDURE — 86833 HLA CLASS II HIGH DEFIN QUAL: CPT | Mod: PO,TXP

## 2019-01-15 PROBLEM — R74.01 TRANSAMINITIS: Status: ACTIVE | Noted: 2019-01-15

## 2019-01-16 PROBLEM — I21.4 NSTEMI (NON-ST ELEVATED MYOCARDIAL INFARCTION): Status: ACTIVE | Noted: 2019-01-16

## 2019-01-30 LAB — HPRA INTERPRETATION: NORMAL

## 2019-02-05 LAB
CLASS I ANTIBODIES - LUMINEX: NORMAL
CLASS I ANTIBODY COMMENTS - LUMINEX: NORMAL
CLASS II ANTIBODIES - LUMINEX: NORMAL
CPRA %: 92
SERUM COLLECTION DT - LUMINEX CLASS I: NORMAL
SERUM COLLECTION DT - LUMINEX CLASS II: NORMAL
SPCL1 TESTING DATE: NORMAL
SPCL2 TESTING DATE: NORMAL
SPCLU TESTING DATE: NORMAL

## 2019-02-11 ENCOUNTER — LAB VISIT (OUTPATIENT)
Dept: LAB | Facility: HOSPITAL | Age: 44
End: 2019-02-11
Payer: COMMERCIAL

## 2019-02-11 DIAGNOSIS — Z76.82 ORGAN TRANSPLANT CANDIDATE: ICD-10-CM

## 2019-02-11 PROCEDURE — 99001 SPECIMEN HANDLING PT-LAB: CPT | Mod: PO,TXP

## 2019-02-26 ENCOUNTER — OFFICE VISIT (OUTPATIENT)
Dept: CARDIOLOGY | Facility: CLINIC | Age: 44
End: 2019-02-26
Payer: MEDICARE

## 2019-02-26 VITALS
HEART RATE: 90 BPM | DIASTOLIC BLOOD PRESSURE: 82 MMHG | WEIGHT: 176.38 LBS | HEIGHT: 65 IN | BODY MASS INDEX: 29.38 KG/M2 | SYSTOLIC BLOOD PRESSURE: 179 MMHG

## 2019-02-26 DIAGNOSIS — I25.10 CORONARY ARTERY DISEASE, ANGINA PRESENCE UNSPECIFIED, UNSPECIFIED VESSEL OR LESION TYPE, UNSPECIFIED WHETHER NATIVE OR TRANSPLANTED HEART: Primary | ICD-10-CM

## 2019-02-26 DIAGNOSIS — I21.4 NSTEMI (NON-ST ELEVATED MYOCARDIAL INFARCTION): ICD-10-CM

## 2019-02-26 PROCEDURE — 99213 OFFICE O/P EST LOW 20 MIN: CPT | Mod: PBBFAC,PO,TXP | Performed by: INTERNAL MEDICINE

## 2019-02-26 PROCEDURE — 99999 PR PBB SHADOW E&M-EST. PATIENT-LVL III: ICD-10-PCS | Mod: PBBFAC,TXP,, | Performed by: INTERNAL MEDICINE

## 2019-02-26 PROCEDURE — 99214 OFFICE O/P EST MOD 30 MIN: CPT | Mod: S$PBB,NTX,, | Performed by: INTERNAL MEDICINE

## 2019-02-26 PROCEDURE — 99214 PR OFFICE/OUTPT VISIT, EST, LEVL IV, 30-39 MIN: ICD-10-PCS | Mod: S$PBB,NTX,, | Performed by: INTERNAL MEDICINE

## 2019-02-26 PROCEDURE — 99999 PR PBB SHADOW E&M-EST. PATIENT-LVL III: CPT | Mod: PBBFAC,TXP,, | Performed by: INTERNAL MEDICINE

## 2019-02-26 NOTE — PROGRESS NOTES
Pt status has been changed to inactive due to insurance complications. Status change updated in UNET & [place]. Letter request in Epic & copies sent to pt, [facility] & referring nephrologist.   Received this email from JOHNNY Mckeon:  Patient is still pending Medicare approval. Patient has Cigna but insurance states that patient must enroll in Medicare A/B for they are secondary to Medicare and will not pay as primary because patient is entitled to Medicare. Called patient - she states that she applied and someone told her she was approved but have not received approval letter or card. Verified via passport that patient does not have Medicare coverage at this time.   Patient folder will be in inactive file cabinet.   Spoke w/pt regarding inactive status & she verbalized understanding.

## 2019-02-26 NOTE — PROGRESS NOTES
Subjective:    Patient ID:  Gilda Schroeder is a 43 y.o. female who presents for follow-up of Hypertension; Coronary Artery Disease; and Palpitations      HPI  Admitted to [facility] with abdominal issues, elevated liver enzymes and elevated TnI  Echocardiogram showed normal EF  No angiogram done  She comes with no complaints, no chest pain, no shortness of breath      Review of Systems   Constitution: Negative for decreased appetite, weakness, malaise/fatigue, weight gain and weight loss.   Cardiovascular: Negative for chest pain, dyspnea on exertion, leg swelling, palpitations and syncope.   Respiratory: Negative for cough and shortness of breath.    Gastrointestinal: Negative.    All other systems reviewed and are negative.       Objective:      Physical Exam   Constitutional: She is oriented to person, place, and time. She appears well-developed and well-nourished.   HENT:   Head: Normocephalic.   Eyes: Pupils are equal, round, and reactive to light.   Neck: Normal range of motion. Neck supple. No JVD present. Carotid bruit is not present. No thyromegaly present.   Cardiovascular: Normal rate, regular rhythm, normal heart sounds, intact distal pulses and normal pulses. PMI is not displaced. Exam reveals no gallop.   No murmur heard.  Pulmonary/Chest: Effort normal and breath sounds normal.   Abdominal: Soft. Normal appearance. She exhibits no mass. There is no hepatosplenomegaly. There is no tenderness.   Musculoskeletal: Normal range of motion. She exhibits no edema.   Neurological: She is alert and oriented to person, place, and time. She has normal strength and normal reflexes. No sensory deficit.   Skin: Skin is warm and intact.   Psychiatric: She has a normal mood and affect.   Nursing note and vitals reviewed.        Assessment:       1. Coronary artery disease, angina presence unspecified, unspecified vessel or lesion type, unspecified whether native or transplanted heart    2. NSTEMI  (non-ST elevated myocardial infarction)         Plan:     Continue all cardiac medications  [facility]  Call with results  6 m f/u if test ok

## 2019-03-11 PROBLEM — E87.70 VOLUME OVERLOAD: Status: ACTIVE | Noted: 2019-03-11

## 2019-03-11 PROBLEM — R11.0 NAUSEA: Status: ACTIVE | Noted: 2019-03-11

## 2019-03-11 PROBLEM — N18.9 CHRONIC RENAL FAILURE: Status: ACTIVE | Noted: 2019-03-11

## 2019-03-11 PROBLEM — R10.9 ABDOMINAL PAIN: Status: ACTIVE | Noted: 2018-01-11

## 2019-03-16 PROBLEM — K29.00 ACUTE GASTRITIS WITHOUT HEMORRHAGE: Status: ACTIVE | Noted: 2019-03-16

## 2019-03-22 ENCOUNTER — TELEPHONE (OUTPATIENT)
Dept: CARDIOLOGY | Facility: CLINIC | Age: 44
End: 2019-03-22

## 2019-03-22 NOTE — TELEPHONE ENCOUNTER
----- Message from Lana Levine sent at 3/22/2019 11:32 AM CDT -----  Contact: Patient  Type:  Sooner Apoointment Request    Caller is requesting a sooner appointment.  Caller declined first available appointment listed below.  Caller will not accept being placed on the waitlist and is requesting a message be sent to doctor.    Name of Caller:  Patient  When is the first available appointment?  4/11/19 after 6 pm  Symptoms:  [facility] f/u in 3 wks- hypertension, discharged 3/16/19  Best Call Back Number:    Additional Information:  Calling to schedule a sooner date and earlier time. Please advise.

## 2019-04-23 NOTE — LETTER
April 23, 2019    Gilda Schroeder  [address] [place] [address]    Dear Gilda Schroeder:  MRN: [id number]    The [facility] transplant team reviewed your transplant candidacy.  It is our programs opinion that you are a transplant candidate and are re-activated at our facility as of 4/23/19.  You are now eligible to receive a transplant.  Your status is now Status 1.    Attached is a letter from the United Network for Organ Sharing (UNOS).  It describes the services and information offered to patients by UNOS and the Organ Procurement and Transplant Network.    The [facility] transplant team remains available to answer any questions.  Should you have any questions regarding this decision, please do not hesitate to contact our pre-transplant office.      Sincerely,    TALITA TorresN, RN, MPH  Kidney Transplant Coordinator  [facility] [place]  [address] [place] [id number]  [phone]    Faxed to:  Roselia Rose MD       OPTN/UNOS: Your Resource for Organ Transplant Information        If you have a question regarding your own medical care, you always should call your transplant center first. However, for general organ transplant-related information, you can call the United Network for Organ Sharing (UNOS) toll-free patient services line at 1-389.396.6661.    Anyone, including potential transplant candidates, recipients, family members/friends, living donors, and/or donor family members can call this number to:    · talk about organ donation, living donation, how transplant and donation work, the donation process, transplant policies, and transplant/donor information;  · get a free patient information kit with helpful booklets, waiting list and transplant information, and a list of all transplant centers;  · ask questions about the Organ Procurement and Transplantation Network (OPTN) web site (www.optn.transplant.hrsa.gov); the UNOS Web site  (www.unos.org); or the UNOS web site for living donors and transplant recipients (www.transplantliving.org);  · learn how UNOS and the OPTN can help you;  · talk about any concerns that you may have with a transplant center and how they perform    UNOS is a not-for-profit organization that provides all of the administrative services for the national OPTN under federal contract to the Health Resources and Services Administration (HRSA), an agency under the U.S. Department of Health and Human Services (HHS).     UNOS and OPTN responsibilities include:    · writing educational material for patients, the public and professionals;  · helping to make people aware of the need for donated organs and tissue;  · writing organ transplant policy with help from doctors, nurses, transplant patients/candidates, donor families, living donors, and the public;  · coordinating the organ matching and placement process;  · collecting information about every organ transplant and donation that occurs in the [place].    Remember, you should contact your transplant center directly if you have questions or concerns about your own medical care including medical records, work-up progress and test reports. [facility] is not your transplant center, and staff at [facility] will not be able to transfer you to your transplant center, so keep your transplant centers phone number handy. But, while you research your transplant needs and learn as much as you can about transplantation and donation, we welcome your call to our toll-free patient services line at [phone].

## 2019-04-23 NOTE — PROGRESS NOTES
Pt has been re-activated on kidney transplant [facility] and has been cleared from a financial perspective.

## 2019-04-24 ENCOUNTER — OFFICE VISIT (OUTPATIENT)
Dept: CARDIOLOGY | Facility: CLINIC | Age: 44
End: 2019-04-24
Payer: MEDICARE

## 2019-04-24 VITALS
HEART RATE: 86 BPM | DIASTOLIC BLOOD PRESSURE: 80 MMHG | SYSTOLIC BLOOD PRESSURE: 162 MMHG | HEIGHT: 65 IN | BODY MASS INDEX: 30.89 KG/M2 | WEIGHT: 185.44 LBS

## 2019-04-24 DIAGNOSIS — I12.0 BENIGN HYPERTENSION WITH ESRD (END-STAGE RENAL DISEASE): ICD-10-CM

## 2019-04-24 DIAGNOSIS — I25.10 CORONARY ARTERY DISEASE INVOLVING NATIVE CORONARY ARTERY OF NATIVE HEART WITHOUT ANGINA PECTORIS: Primary | ICD-10-CM

## 2019-04-24 DIAGNOSIS — N18.6 BENIGN HYPERTENSION WITH ESRD (END-STAGE RENAL DISEASE): ICD-10-CM

## 2019-04-24 PROCEDURE — 99999 PR PBB SHADOW E&M-EST. PATIENT-LVL III: CPT | Mod: PBBFAC,TXP,, | Performed by: INTERNAL MEDICINE

## 2019-04-24 PROCEDURE — 99213 OFFICE O/P EST LOW 20 MIN: CPT | Mod: PBBFAC,PO,TXP | Performed by: INTERNAL MEDICINE

## 2019-04-24 PROCEDURE — 99214 PR OFFICE/OUTPT VISIT, EST, LEVL IV, 30-39 MIN: ICD-10-PCS | Mod: S$PBB,NTX,, | Performed by: INTERNAL MEDICINE

## 2019-04-24 PROCEDURE — 99214 OFFICE O/P EST MOD 30 MIN: CPT | Mod: S$PBB,NTX,, | Performed by: INTERNAL MEDICINE

## 2019-04-24 PROCEDURE — 99999 PR PBB SHADOW E&M-EST. PATIENT-LVL III: ICD-10-PCS | Mod: PBBFAC,TXP,, | Performed by: INTERNAL MEDICINE

## 2019-04-24 RX ORDER — ROSUVASTATIN CALCIUM 5 MG/1
5 TABLET, COATED ORAL DAILY
Qty: 90 TABLET | Refills: 3 | Status: ON HOLD | OUTPATIENT
Start: 2019-04-24 | End: 2019-06-12 | Stop reason: SDUPTHER

## 2019-04-24 NOTE — PROGRESS NOTES
Subjective:    Patient ID:  Gilda Schroeder is a 43 y.o. female who presents for follow-up of CAD F/U      HPI  Admitted to [facility] last month with chest pain. Stress test (+). Angiogram showed patent stent RCA, non-obstructive disease in LAD, LCx  She comes with no complaints, no chest pain, no shortness of breath      Review of Systems   Constitution: Negative for decreased appetite, malaise/fatigue, weight gain and weight loss.   Cardiovascular: Negative for chest pain, dyspnea on exertion, leg swelling, palpitations and syncope.   Respiratory: Negative for cough and shortness of breath.    Gastrointestinal: Negative.    Neurological: Negative for weakness.   All other systems reviewed and are negative.       Objective:      Physical Exam   Constitutional: She is oriented to person, place, and time. She appears well-developed and well-nourished.   HENT:   Head: Normocephalic.   Eyes: Pupils are equal, round, and reactive to light.   Neck: Normal range of motion. Neck supple. No JVD present. Carotid bruit is not present. No thyromegaly present.   Cardiovascular: Normal rate, regular rhythm, normal heart sounds, intact distal pulses and normal pulses. PMI is not displaced. Exam reveals no gallop.   No murmur heard.  Pulmonary/Chest: Effort normal and breath sounds normal.   Abdominal: Soft. Normal appearance. She exhibits no mass. There is no hepatosplenomegaly. There is no tenderness.   Musculoskeletal: Normal range of motion. She exhibits no edema.   Neurological: She is alert and oriented to person, place, and time. She has normal strength and normal reflexes. No sensory deficit.   Skin: Skin is warm and intact.   Psychiatric: She has a normal mood and affect.   Nursing note and vitals reviewed.        Assessment:       1. Coronary artery disease involving native coronary artery of native heart without angina pectoris    2. Benign hypertension with ESRD (end-stage renal disease)         Plan:   Add  crestor  Continue all cardiac medications  Regular exercise program  Weight loss  6 m f/u with labs

## 2019-06-09 ENCOUNTER — HOSPITAL ENCOUNTER (INPATIENT)
Facility: HOSPITAL | Age: 44
LOS: 3 days | Discharge: HOME | DRG: 246 | End: 2019-06-12
Attending: EMERGENCY MEDICINE | Admitting: EMERGENCY MEDICINE
Payer: MEDICARE

## 2019-06-09 DIAGNOSIS — R11.10 VOMITING, INTRACTABILITY OF VOMITING NOT SPECIFIED, PRESENCE OF NAUSEA NOT SPECIFIED, UNSPECIFIED VOMITING TYPE: ICD-10-CM

## 2019-06-09 DIAGNOSIS — I25.10 CAD (CORONARY ARTERY DISEASE): ICD-10-CM

## 2019-06-09 DIAGNOSIS — I16.9 HYPERTENSIVE CRISIS: Primary | ICD-10-CM

## 2019-06-09 DIAGNOSIS — N18.6 ESRD ON DIALYSIS: ICD-10-CM

## 2019-06-09 DIAGNOSIS — R00.0 TACHYCARDIA: ICD-10-CM

## 2019-06-09 DIAGNOSIS — E87.5 HYPERKALEMIA: ICD-10-CM

## 2019-06-09 DIAGNOSIS — R09.02 HYPOXIA: ICD-10-CM

## 2019-06-09 DIAGNOSIS — R07.9 CHEST PAIN: ICD-10-CM

## 2019-06-09 DIAGNOSIS — R06.02 SOB (SHORTNESS OF BREATH): ICD-10-CM

## 2019-06-09 DIAGNOSIS — I21.4 NSTEMI (NON-ST ELEVATED MYOCARDIAL INFARCTION): ICD-10-CM

## 2019-06-09 DIAGNOSIS — N19 UREMIA: ICD-10-CM

## 2019-06-09 DIAGNOSIS — I50.9 ACUTE CONGESTIVE HEART FAILURE, UNSPECIFIED HEART FAILURE TYPE: ICD-10-CM

## 2019-06-09 DIAGNOSIS — I95.1 ORTHOSTATIC HYPOTENSION: ICD-10-CM

## 2019-06-09 DIAGNOSIS — J96.01 ACUTE RESPIRATORY FAILURE WITH HYPOXIA: ICD-10-CM

## 2019-06-09 DIAGNOSIS — R06.03 RESPIRATORY DISTRESS: ICD-10-CM

## 2019-06-09 DIAGNOSIS — Z99.2 ESRD ON DIALYSIS: ICD-10-CM

## 2019-06-09 DIAGNOSIS — R10.13 EPIGASTRIC ABDOMINAL PAIN: ICD-10-CM

## 2019-06-09 LAB
ABO + RH BLD: NORMAL
ALBUMIN SERPL BCP-MCNC: 3.7 G/DL (ref 3.5–5.2)
ALP SERPL-CCNC: 110 U/L (ref 55–135)
ALT SERPL W/O P-5'-P-CCNC: 20 U/L (ref 10–44)
ANION GAP SERPL CALC-SCNC: 16 MMOL/L (ref 8–16)
APTT BLDCRRT: 27.8 SEC (ref 21–32)
AST SERPL-CCNC: 19 U/L (ref 10–40)
BASOPHILS # BLD AUTO: 0.03 K/UL (ref 0–0.2)
BASOPHILS NFR BLD: 0.3 % (ref 0–1.9)
BILIRUB SERPL-MCNC: 0.7 MG/DL (ref 0.1–1)
BLD GP AB SCN CELLS X3 SERPL QL: NORMAL
BNP SERPL-MCNC: 2443 PG/ML (ref 0–99)
BUN SERPL-MCNC: 65 MG/DL (ref 6–20)
CALCIUM SERPL-MCNC: 9.4 MG/DL (ref 8.7–10.5)
CHLORIDE SERPL-SCNC: 96 MMOL/L (ref 95–110)
CO2 SERPL-SCNC: 26 MMOL/L (ref 23–29)
CREAT SERPL-MCNC: 12.7 MG/DL (ref 0.5–1.4)
DIFFERENTIAL METHOD: ABNORMAL
EOSINOPHIL # BLD AUTO: 0.1 K/UL (ref 0–0.5)
EOSINOPHIL NFR BLD: 0.7 % (ref 0–8)
ERYTHROCYTE [DISTWIDTH] IN BLOOD BY AUTOMATED COUNT: 14.4 % (ref 11.5–14.5)
EST. GFR  (AFRICAN AMERICAN): 4 ML/MIN/1.73 M^2
EST. GFR  (NON AFRICAN AMERICAN): 3 ML/MIN/1.73 M^2
GLUCOSE SERPL-MCNC: 315 MG/DL (ref 70–110)
HCT VFR BLD AUTO: 31.6 % (ref 37–48.5)
HGB BLD-MCNC: 10.1 G/DL (ref 12–16)
INR PPP: 1 (ref 0.8–1.2)
INR PPP: 1 (ref 0.8–1.2)
LACTATE SERPL-SCNC: 0.9 MMOL/L (ref 0.5–2.2)
LIPASE SERPL-CCNC: 55 U/L (ref 4–60)
LYMPHOCYTES # BLD AUTO: 0.7 K/UL (ref 1–4.8)
LYMPHOCYTES NFR BLD: 6.4 % (ref 18–48)
MAGNESIUM SERPL-MCNC: 2 MG/DL (ref 1.6–2.6)
MCH RBC QN AUTO: 28.9 PG (ref 27–31)
MCHC RBC AUTO-ENTMCNC: 32 G/DL (ref 32–36)
MCV RBC AUTO: 90 FL (ref 82–98)
MONOCYTES # BLD AUTO: 0.3 K/UL (ref 0.3–1)
MONOCYTES NFR BLD: 2.5 % (ref 4–15)
NEUTROPHILS # BLD AUTO: 9.7 K/UL (ref 1.8–7.7)
NEUTROPHILS NFR BLD: 90.1 % (ref 38–73)
PHOSPHATE SERPL-MCNC: 7.5 MG/DL (ref 2.7–4.5)
PLATELET # BLD AUTO: 259 K/UL (ref 150–350)
PMV BLD AUTO: 10 FL (ref 9.2–12.9)
POCT GLUCOSE: 146 MG/DL (ref 70–110)
POCT GLUCOSE: 195 MG/DL (ref 70–110)
POTASSIUM SERPL-SCNC: 6.2 MMOL/L (ref 3.5–5.1)
PROT SERPL-MCNC: 7.8 G/DL (ref 6–8.4)
PROTHROMBIN TIME: 10.4 SEC (ref 9–12.5)
PROTHROMBIN TIME: 10.5 SEC (ref 9–12.5)
RBC # BLD AUTO: 3.5 M/UL (ref 4–5.4)
SODIUM SERPL-SCNC: 138 MMOL/L (ref 136–145)
TROPONIN I SERPL DL<=0.01 NG/ML-MCNC: 0.39 NG/ML (ref 0–0.03)
TROPONIN I SERPL DL<=0.01 NG/ML-MCNC: 1.04 NG/ML (ref 0–0.03)
TROPONIN I SERPL DL<=0.01 NG/ML-MCNC: 1.57 NG/ML (ref 0–0.03)
WBC # BLD AUTO: 10.72 K/UL (ref 3.9–12.7)

## 2019-06-09 PROCEDURE — 80053 COMPREHEN METABOLIC PANEL: CPT | Mod: NTX

## 2019-06-09 PROCEDURE — 96374 THER/PROPH/DIAG INJ IV PUSH: CPT | Mod: NTX

## 2019-06-09 PROCEDURE — 80100016 HC MAINTENANCE HEMODIALYSIS: Mod: NTX

## 2019-06-09 PROCEDURE — 25000242 PHARM REV CODE 250 ALT 637 W/ HCPCS: Mod: NTX | Performed by: EMERGENCY MEDICINE

## 2019-06-09 PROCEDURE — 93010 EKG 12-LEAD: ICD-10-PCS | Mod: NTX,,, | Performed by: INTERNAL MEDICINE

## 2019-06-09 PROCEDURE — 25000003 PHARM REV CODE 250: Mod: NTX | Performed by: EMERGENCY MEDICINE

## 2019-06-09 PROCEDURE — 93010 ELECTROCARDIOGRAM REPORT: CPT | Mod: NTX,,, | Performed by: INTERNAL MEDICINE

## 2019-06-09 PROCEDURE — 85025 COMPLETE CBC W/AUTO DIFF WBC: CPT | Mod: NTX

## 2019-06-09 PROCEDURE — 86850 RBC ANTIBODY SCREEN: CPT | Mod: NTX

## 2019-06-09 PROCEDURE — 96375 TX/PRO/DX INJ NEW DRUG ADDON: CPT | Mod: NTX

## 2019-06-09 PROCEDURE — 93005 ELECTROCARDIOGRAM TRACING: CPT | Mod: NTX

## 2019-06-09 PROCEDURE — 84484 ASSAY OF TROPONIN QUANT: CPT | Mod: 91,NTX

## 2019-06-09 PROCEDURE — 82962 GLUCOSE BLOOD TEST: CPT | Mod: 59,NTX

## 2019-06-09 PROCEDURE — 83735 ASSAY OF MAGNESIUM: CPT | Mod: NTX

## 2019-06-09 PROCEDURE — 85730 THROMBOPLASTIN TIME PARTIAL: CPT | Mod: NTX

## 2019-06-09 PROCEDURE — 63600175 PHARM REV CODE 636 W HCPCS: Mod: NTX | Performed by: EMERGENCY MEDICINE

## 2019-06-09 PROCEDURE — 36415 COLL VENOUS BLD VENIPUNCTURE: CPT | Mod: NTX

## 2019-06-09 PROCEDURE — S5571 INSULIN DISPOS PEN 3 ML: HCPCS | Mod: NTX | Performed by: EMERGENCY MEDICINE

## 2019-06-09 PROCEDURE — 85610 PROTHROMBIN TIME: CPT | Mod: NTX

## 2019-06-09 PROCEDURE — 96376 TX/PRO/DX INJ SAME DRUG ADON: CPT | Mod: NTX

## 2019-06-09 PROCEDURE — 86706 HEP B SURFACE ANTIBODY: CPT | Mod: NTX

## 2019-06-09 PROCEDURE — 94640 AIRWAY INHALATION TREATMENT: CPT | Mod: NTX

## 2019-06-09 PROCEDURE — 99222 PR INITIAL HOSPITAL CARE,LEVL II: ICD-10-PCS | Mod: 25,NTX,, | Performed by: INTERNAL MEDICINE

## 2019-06-09 PROCEDURE — 99291 CRITICAL CARE FIRST HOUR: CPT | Mod: 25,NTX

## 2019-06-09 PROCEDURE — 85610 PROTHROMBIN TIME: CPT | Mod: 91,NTX

## 2019-06-09 PROCEDURE — 83605 ASSAY OF LACTIC ACID: CPT | Mod: NTX

## 2019-06-09 PROCEDURE — 87340 HEPATITIS B SURFACE AG IA: CPT | Mod: NTX

## 2019-06-09 PROCEDURE — 99222 1ST HOSP IP/OBS MODERATE 55: CPT | Mod: 25,NTX,, | Performed by: INTERNAL MEDICINE

## 2019-06-09 PROCEDURE — 63600175 PHARM REV CODE 636 W HCPCS: Mod: NTX | Performed by: INTERNAL MEDICINE

## 2019-06-09 PROCEDURE — 25000003 PHARM REV CODE 250: Mod: NTX | Performed by: INTERNAL MEDICINE

## 2019-06-09 PROCEDURE — 84100 ASSAY OF PHOSPHORUS: CPT | Mod: NTX

## 2019-06-09 PROCEDURE — 86704 HEP B CORE ANTIBODY TOTAL: CPT | Mod: NTX

## 2019-06-09 PROCEDURE — 86709 HEPATITIS A IGM ANTIBODY: CPT | Mod: NTX

## 2019-06-09 PROCEDURE — 87040 BLOOD CULTURE FOR BACTERIA: CPT | Mod: 59,NTX

## 2019-06-09 PROCEDURE — 83880 ASSAY OF NATRIURETIC PEPTIDE: CPT | Mod: NTX

## 2019-06-09 PROCEDURE — 83690 ASSAY OF LIPASE: CPT | Mod: NTX

## 2019-06-09 PROCEDURE — 21400001 HC TELEMETRY ROOM: Mod: NTX

## 2019-06-09 RX ORDER — ONDANSETRON 2 MG/ML
4 INJECTION INTRAMUSCULAR; INTRAVENOUS
Status: COMPLETED | OUTPATIENT
Start: 2019-06-09 | End: 2019-06-09

## 2019-06-09 RX ORDER — ASPIRIN 81 MG/1
81 TABLET ORAL DAILY
Status: DISCONTINUED | OUTPATIENT
Start: 2019-06-09 | End: 2019-06-09

## 2019-06-09 RX ORDER — HYDROCODONE BITARTRATE AND ACETAMINOPHEN 5; 325 MG/1; MG/1
1 TABLET ORAL EVERY 6 HOURS PRN
Status: DISCONTINUED | OUTPATIENT
Start: 2019-06-09 | End: 2019-06-12 | Stop reason: HOSPADM

## 2019-06-09 RX ORDER — SODIUM CHLORIDE 9 MG/ML
INJECTION, SOLUTION INTRAVENOUS ONCE
Status: DISCONTINUED | OUTPATIENT
Start: 2019-06-09 | End: 2019-06-10

## 2019-06-09 RX ORDER — IBUPROFEN 200 MG
24 TABLET ORAL
Status: DISCONTINUED | OUTPATIENT
Start: 2019-06-09 | End: 2019-06-12 | Stop reason: HOSPADM

## 2019-06-09 RX ORDER — AMLODIPINE BESYLATE 5 MG/1
10 TABLET ORAL DAILY
Status: DISCONTINUED | OUTPATIENT
Start: 2019-06-09 | End: 2019-06-11

## 2019-06-09 RX ORDER — SEVELAMER CARBONATE 800 MG/1
800 TABLET, FILM COATED ORAL
Status: DISCONTINUED | OUTPATIENT
Start: 2019-06-09 | End: 2019-06-10

## 2019-06-09 RX ORDER — HYDRALAZINE HYDROCHLORIDE 25 MG/1
100 TABLET, FILM COATED ORAL 3 TIMES DAILY
Status: DISCONTINUED | OUTPATIENT
Start: 2019-06-09 | End: 2019-06-11

## 2019-06-09 RX ORDER — METOCLOPRAMIDE 5 MG/1
5 TABLET ORAL
Status: ON HOLD | COMMUNITY
End: 2020-11-25 | Stop reason: HOSPADM

## 2019-06-09 RX ORDER — PANTOPRAZOLE SODIUM 40 MG/1
40 TABLET, DELAYED RELEASE ORAL DAILY
Status: DISCONTINUED | OUTPATIENT
Start: 2019-06-09 | End: 2019-06-12 | Stop reason: HOSPADM

## 2019-06-09 RX ORDER — ALBUTEROL SULFATE 2.5 MG/.5ML
2.5 SOLUTION RESPIRATORY (INHALATION)
Status: COMPLETED | OUTPATIENT
Start: 2019-06-09 | End: 2019-06-09

## 2019-06-09 RX ORDER — HYDROMORPHONE HYDROCHLORIDE 2 MG/ML
1 INJECTION, SOLUTION INTRAMUSCULAR; INTRAVENOUS; SUBCUTANEOUS
Status: COMPLETED | OUTPATIENT
Start: 2019-06-09 | End: 2019-06-09

## 2019-06-09 RX ORDER — HEPARIN SODIUM,PORCINE/D5W 25000/250
12 INTRAVENOUS SOLUTION INTRAVENOUS CONTINUOUS
Status: DISCONTINUED | OUTPATIENT
Start: 2019-06-09 | End: 2019-06-10

## 2019-06-09 RX ORDER — ASPIRIN 325 MG
325 TABLET ORAL DAILY
Status: DISCONTINUED | OUTPATIENT
Start: 2019-06-10 | End: 2019-06-11

## 2019-06-09 RX ORDER — SEVELAMER CARBONATE 800 MG/1
800 TABLET, FILM COATED ORAL
Status: ON HOLD | COMMUNITY
End: 2019-06-12 | Stop reason: SDUPTHER

## 2019-06-09 RX ORDER — HYDRALAZINE HYDROCHLORIDE 20 MG/ML
20 INJECTION INTRAMUSCULAR; INTRAVENOUS
Status: COMPLETED | OUTPATIENT
Start: 2019-06-09 | End: 2019-06-09

## 2019-06-09 RX ORDER — CLONIDINE HYDROCHLORIDE 0.2 MG/1
0.2 TABLET ORAL 2 TIMES DAILY
Status: ON HOLD | COMMUNITY
End: 2019-06-12 | Stop reason: HOSPADM

## 2019-06-09 RX ORDER — FUROSEMIDE 10 MG/ML
80 INJECTION INTRAMUSCULAR; INTRAVENOUS
Status: COMPLETED | OUTPATIENT
Start: 2019-06-09 | End: 2019-06-09

## 2019-06-09 RX ORDER — HYDRALAZINE HYDROCHLORIDE 20 MG/ML
10 INJECTION INTRAMUSCULAR; INTRAVENOUS
Status: COMPLETED | OUTPATIENT
Start: 2019-06-09 | End: 2019-06-09

## 2019-06-09 RX ORDER — IBUPROFEN 200 MG
16 TABLET ORAL
Status: DISCONTINUED | OUTPATIENT
Start: 2019-06-09 | End: 2019-06-12 | Stop reason: HOSPADM

## 2019-06-09 RX ORDER — ACETAMINOPHEN 325 MG/1
650 TABLET ORAL
Status: COMPLETED | OUTPATIENT
Start: 2019-06-09 | End: 2019-06-09

## 2019-06-09 RX ORDER — HYDRALAZINE HYDROCHLORIDE 20 MG/ML
10 INJECTION INTRAMUSCULAR; INTRAVENOUS EVERY 6 HOURS PRN
Status: DISCONTINUED | OUTPATIENT
Start: 2019-06-09 | End: 2019-06-12 | Stop reason: HOSPADM

## 2019-06-09 RX ORDER — SODIUM CHLORIDE 9 MG/ML
INJECTION, SOLUTION INTRAVENOUS
Status: DISCONTINUED | OUTPATIENT
Start: 2019-06-09 | End: 2019-06-12 | Stop reason: HOSPADM

## 2019-06-09 RX ORDER — CALCITRIOL 0.25 UG/1
0.5 CAPSULE ORAL DAILY
Status: DISCONTINUED | OUTPATIENT
Start: 2019-06-09 | End: 2019-06-10

## 2019-06-09 RX ORDER — CLONIDINE HYDROCHLORIDE 0.1 MG/1
0.2 TABLET ORAL 2 TIMES DAILY
Status: DISCONTINUED | OUTPATIENT
Start: 2019-06-09 | End: 2019-06-11

## 2019-06-09 RX ORDER — INSULIN ASPART 100 [IU]/ML
0-5 INJECTION, SOLUTION INTRAVENOUS; SUBCUTANEOUS
Status: DISCONTINUED | OUTPATIENT
Start: 2019-06-09 | End: 2019-06-12 | Stop reason: HOSPADM

## 2019-06-09 RX ORDER — CLOPIDOGREL BISULFATE 75 MG/1
75 TABLET ORAL DAILY
Status: DISCONTINUED | OUTPATIENT
Start: 2019-06-09 | End: 2019-06-12 | Stop reason: HOSPADM

## 2019-06-09 RX ORDER — AMOXICILLIN 250 MG
1 CAPSULE ORAL DAILY PRN
Status: DISCONTINUED | OUTPATIENT
Start: 2019-06-09 | End: 2019-06-12 | Stop reason: HOSPADM

## 2019-06-09 RX ORDER — GLUCAGON 1 MG
1 KIT INJECTION
Status: DISCONTINUED | OUTPATIENT
Start: 2019-06-09 | End: 2019-06-12 | Stop reason: HOSPADM

## 2019-06-09 RX ORDER — CARVEDILOL 6.25 MG/1
25 TABLET ORAL 2 TIMES DAILY
Status: DISCONTINUED | OUTPATIENT
Start: 2019-06-09 | End: 2019-06-11

## 2019-06-09 RX ORDER — MUPIROCIN 20 MG/G
OINTMENT TOPICAL 2 TIMES DAILY
Status: DISCONTINUED | OUTPATIENT
Start: 2019-06-09 | End: 2019-06-12 | Stop reason: HOSPADM

## 2019-06-09 RX ORDER — ROSUVASTATIN CALCIUM 5 MG/1
5 TABLET, COATED ORAL DAILY
Status: DISCONTINUED | OUTPATIENT
Start: 2019-06-09 | End: 2019-06-12 | Stop reason: HOSPADM

## 2019-06-09 RX ORDER — POLYETHYLENE GLYCOL 3350 17 G/17G
17 POWDER, FOR SOLUTION ORAL 2 TIMES DAILY PRN
Status: DISCONTINUED | OUTPATIENT
Start: 2019-06-09 | End: 2019-06-12 | Stop reason: HOSPADM

## 2019-06-09 RX ORDER — METOCLOPRAMIDE HYDROCHLORIDE 5 MG/5ML
5 SOLUTION ORAL 4 TIMES DAILY
Status: DISCONTINUED | OUTPATIENT
Start: 2019-06-09 | End: 2019-06-12 | Stop reason: HOSPADM

## 2019-06-09 RX ORDER — SODIUM CHLORIDE 0.9 % (FLUSH) 0.9 %
10 SYRINGE (ML) INJECTION
Status: DISCONTINUED | OUTPATIENT
Start: 2019-06-09 | End: 2019-06-12 | Stop reason: HOSPADM

## 2019-06-09 RX ORDER — SODIUM CHLORIDE 9 MG/ML
INJECTION, SOLUTION INTRAVENOUS
Status: DISCONTINUED | OUTPATIENT
Start: 2019-06-09 | End: 2019-06-10

## 2019-06-09 RX ORDER — ONDANSETRON 2 MG/ML
4 INJECTION INTRAMUSCULAR; INTRAVENOUS EVERY 8 HOURS PRN
Status: DISCONTINUED | OUTPATIENT
Start: 2019-06-09 | End: 2019-06-10

## 2019-06-09 RX ORDER — ACETAMINOPHEN 325 MG/1
650 TABLET ORAL EVERY 4 HOURS PRN
Status: DISCONTINUED | OUTPATIENT
Start: 2019-06-09 | End: 2019-06-12 | Stop reason: HOSPADM

## 2019-06-09 RX ADMIN — CLONIDINE HYDROCHLORIDE 0.2 MG: 0.1 TABLET ORAL at 09:06

## 2019-06-09 RX ADMIN — METOCLOPRAMIDE HYDROCHLORIDE 5 MG: 5 SOLUTION ORAL at 09:06

## 2019-06-09 RX ADMIN — ONDANSETRON 4 MG: 2 INJECTION INTRAMUSCULAR; INTRAVENOUS at 07:06

## 2019-06-09 RX ADMIN — HYDRALAZINE HYDROCHLORIDE 20 MG: 20 INJECTION INTRAMUSCULAR; INTRAVENOUS at 08:06

## 2019-06-09 RX ADMIN — ONDANSETRON 4 MG: 2 INJECTION INTRAMUSCULAR; INTRAVENOUS at 04:06

## 2019-06-09 RX ADMIN — PROMETHAZINE HYDROCHLORIDE 6.25 MG: 25 INJECTION INTRAMUSCULAR; INTRAVENOUS at 06:06

## 2019-06-09 RX ADMIN — HYDROCODONE BITARTRATE AND ACETAMINOPHEN 1 TABLET: 5; 325 TABLET ORAL at 06:06

## 2019-06-09 RX ADMIN — ACETAMINOPHEN 650 MG: 325 TABLET, FILM COATED ORAL at 01:06

## 2019-06-09 RX ADMIN — CARVEDILOL 25 MG: 6.25 TABLET, FILM COATED ORAL at 09:06

## 2019-06-09 RX ADMIN — HEPARIN SODIUM 12 UNITS/KG/HR: 10000 INJECTION, SOLUTION INTRAVENOUS at 10:06

## 2019-06-09 RX ADMIN — MUPIROCIN: 20 OINTMENT TOPICAL at 09:06

## 2019-06-09 RX ADMIN — HYDROMORPHONE HYDROCHLORIDE 1 MG: 2 INJECTION, SOLUTION INTRAMUSCULAR; INTRAVENOUS; SUBCUTANEOUS at 08:06

## 2019-06-09 RX ADMIN — INSULIN HUMAN 7 UNITS: 100 INJECTION, SOLUTION PARENTERAL at 11:06

## 2019-06-09 RX ADMIN — HYDRALAZINE HYDROCHLORIDE 10 MG: 20 INJECTION INTRAMUSCULAR; INTRAVENOUS at 11:06

## 2019-06-09 RX ADMIN — HYDRALAZINE HYDROCHLORIDE 100 MG: 25 TABLET ORAL at 09:06

## 2019-06-09 RX ADMIN — NITROGLYCERIN 2 INCH: 20 OINTMENT TOPICAL at 08:06

## 2019-06-09 RX ADMIN — ALBUTEROL SULFATE 2.5 MG: 2.5 SOLUTION RESPIRATORY (INHALATION) at 12:06

## 2019-06-09 RX ADMIN — INSULIN DETEMIR 5 UNITS: 100 INJECTION, SOLUTION SUBCUTANEOUS at 09:06

## 2019-06-09 RX ADMIN — FUROSEMIDE 80 MG: 10 INJECTION, SOLUTION INTRAVENOUS at 08:06

## 2019-06-09 NOTE — Clinical Note
ostium   left main. Angiography performed post intervention . Angiography performed via power injection with .

## 2019-06-09 NOTE — ASSESSMENT & PLAN NOTE
Patient with known coronary artery disease status post PCI of RCA in 2017.  Recent coronary angiogram 3 months ago showed nonobstructive coronary artery disease in the LAD and circumflex as well as patent stents in the RCA.

## 2019-06-09 NOTE — NURSING
Patient complaining of nausea with vomiting patient complaining of generalized pain.HD still in process

## 2019-06-09 NOTE — NURSING
Receive a phone call from HD nurse patient complaining of nausea  With vomiting.Pateint given Zofran,  also requesting to have supper brought to her while in HD. Dr. Batres in with patient in dialysis.

## 2019-06-09 NOTE — ED TRIAGE NOTES
Pt  states pt began throwing up this morning around 2AM this morning with severe upper abdominal pains. Pt extremely tachypenic, diaphoretic, O2 sats in 80's. Pt is a MWF dialysis pt, last dialysis on Friday

## 2019-06-09 NOTE — CONSULTS
Date of Admit: 2019  Length of Stay: 0   Days  Consulting MD: MD Christelle  Date of Consult: 2019    Reason for Consultation     dialysis    Subjective:      History of Present Illness:  Gilda Schroeder is a 44 y.o. year old female with a past medical history significant for home hd under Dr. Rose on the [facility].  Pt  Does 4x days a week with nxstage for 2.5hrs MTTF.  However, pt not to her edw  Of 79kg. Pt notes last hd was Friday with about 3L off.   Pt on hd for about 3yrs. Pt here with hyperkalemia, fluid overload and concerns of a nstemi. Her nephrologist doesn't come to [facility].        Past Medical History:  Past Medical History:   Diagnosis Date    Allergy     morphine    Anemia     Anticoagulant long-term use     heparin    CAD (coronary artery disease)     stent x 2    Diabetes mellitus     Diabetes mellitus with ESRD (end-stage renal disease) 2016    Disorder of kidney and ureter     ESRD (end stage renal disease) on dialysis     Gastroesophageal reflux disease without esophagitis 1/10/2018    HTN (hypertension)     Obesity     Peritonitis associated with peritoneal dialysis 2017       Past Surgical History:  Past Surgical History:   Procedure Laterality Date    ANGIOGRAM-CORONARY N/A 2017    [facility] at [facility] CATH    AV FISTULA PLACEMENT       SECTION      EGD (ESOPHAGOGASTRODUODENOSCOPY) N/A 3/14/2019    Performed by Perry Washburn MD at [facility] ENDO    EXCISION-PILONIDAL CYST N/A 2016    [facility] OR    Fractional Flow [place] (FFR), Coronary  3/16/2019    Performed by [facility] MD at [facility] CATH    heart stent  2017    INSERTION-CATHETER-HEMODIALYSIS N/A 2017    Performed by Miles Bond [facility] at [facility] OR    Left heart cath Right 3/16/2019    Performed by [facility] at [facility] CATH    PLACEMENT PERITONEAL DIALYSIS CATHETER       PORTACATH PLACEMENT      for dialysis; removed    REMOVAL-CATHETER-DIALYSIS-PERITONEAL N/A 8/18/2017    [facility] OR    SKIN GRAFT  10/2017    TUBAL LIGATION         Allergies:  Review of patient's allergies indicates:   Allergen Reactions    Morphine Other (See Comments)     Heart rate slows       Home Medications:    No current facility-administered medications on file prior to encounter.      Current Outpatient Medications on File Prior to Encounter   Medication Sig Dispense Refill    amLODIPine (NORVASC) 10 MG tablet Take 10 mg by mouth once daily.      aspirin (ECOTRIN) 81 MG EC tablet Take 81 mg by mouth once daily.      B,C/FERROUS FUM/FA/D3/ZINC OX (PRORENAL ORAL) Take 1 tablet by mouth once daily.      calcitRIOL (ROCALTROL) 0.5 MCG Cap Take 0.5 mcg by mouth once daily.      carvedilol (COREG) 25 MG tablet TAKE ONE TABLET BY MOUTH TWICE DAILY 60 tablet 5    cloNIDine (CATAPRES) 0.2 MG tablet Take 0.2 mg by mouth 2 (two) times daily.      clopidogrel (PLAVIX) 75 mg tablet Take 75 mg by mouth once daily.      hydrALAZINE (APRESOLINE) 100 MG tablet Take 100 mg by mouth 3 (three) times daily.       hydrOXYzine pamoate (VISTARIL) 25 MG Cap Take 25 mg by mouth 2 (two) times daily. Taking bid      insulin aspart U-100 (NOVOLOG) 100 unit/mL injection Per home dose Sliding Scale TID with meals. 1 vial 5    insulin detemir U-100 (LEVEMIR) 100 unit/mL injection Inject 5 Units into the skin 2 (two) times daily. 10 mL 3    metoclopramide HCl (REGLAN) 5 MG tablet Take 5 mg by mouth 4 (four) times daily.      pantoprazole (PROTONIX) 40 MG tablet Take 1 tablet (40 mg total) by mouth once daily. 30 tablet 0    promethazine (PHENERGAN) 25 MG tablet Take 1 tablet (25 mg total) by mouth every 6 (six) hours as needed for Nausea. 30 tablet 1    rosuvastatin (CRESTOR) 5 MG tablet Take 1 tablet (5 mg total) by mouth once daily. 90 tablet 3    sevelamer carbonate (RENVELA) 800  "mg Tab Take 800 mg by mouth 3 (three) times daily with meals.      ondansetron (ZOFRAN) 4 MG tablet Take 1 tablet (4 mg total) by mouth 2 (two) times daily. 30 tablet 1       Family History:  Family History   Problem Relation Age of Onset    Diabetes Mother     No Known Problems Father     No Known Problems Sister     No Known Problems Brother        Social History:  Social History     Tobacco Use    Smoking status: Former Smoker     Packs/day: 0.50     Years: 12.00     Pack years: 6.00     Types: Cigarettes     Last attempt to quit: 2014     Years since quittin.4   Substance Use Topics    Alcohol use: No    Drug use: No       Review of Systems:    10pt ros: +v, sob, abdominal pain     Objective:     Scheduled Meds:   sodium chloride 0.9%   Intravenous Once    sodium chloride 0.9%   Intravenous Once    mupirocin   Nasal BID     Continuous Infusions:  PRN Meds:.sodium chloride 0.9%, sodium chloride 0.9%      Physical Examination:    Vitals: BP (!) 165/66   Pulse 104   Temp 98.4 °F (36.9 °C) (Oral)   Resp (!) 24   Ht 5' 5" (1.651 m)   Wt 83.9 kg (185 lb)   SpO2 97%   Breastfeeding? No   BMI 30.79 kg/m²    No intake/output data recorded.  No intake/output data recorded.      General: No acute distress   Head: Normocephalic, atraumatic  Eyes: No jaundice  Neck: Supple, no carotid bruit or JVP   Cardiovascular: RRR  Chest: +wheezes  Abdomen: Soft, nondistended, nontender  Extremities:1+edema, avf of left arm  Neurologic: AOx3    Laboratory:  Recent Results (from the past 24 hour(s))   CBC auto differential    Collection Time: 19  9:00 AM   Result Value Ref Range    WBC 10.72 3.90 - 12.70 K/uL    RBC 3.50 (L) 4.00 - 5.40 M/uL    Hemoglobin 10.1 (L) 12.0 - 16.0 g/dL    Hematocrit 31.6 (L) 37.0 - 48.5 %    Mean Corpuscular Volume 90 82 - 98 fL    Mean Corpuscular Hemoglobin 28.9 27.0 - 31.0 pg    Mean Corpuscular Hemoglobin Conc 32.0 32.0 - 36.0 g/dL    RDW 14.4 11.5 - 14.5 %    Platelets 259 " 150 - 350 K/uL    MPV 10.0 9.2 - 12.9 fL    Gran # (ANC) 9.7 (H) 1.8 - 7.7 K/uL    Lymph # 0.7 (L) 1.0 - 4.8 K/uL    Mono # 0.3 0.3 - 1.0 K/uL    Eos # 0.1 0.0 - 0.5 K/uL    Baso # 0.03 0.00 - 0.20 K/uL    Gran% 90.1 (H) 38.0 - 73.0 %    Lymph% 6.4 (L) 18.0 - 48.0 %    Mono% 2.5 (L) 4.0 - 15.0 %    Eosinophil% 0.7 0.0 - 8.0 %    Basophil% 0.3 0.0 - 1.9 %    Differential Method Automated    Comprehensive metabolic panel    Collection Time: 06/09/19  9:00 AM   Result Value Ref Range    Sodium 138 136 - 145 mmol/L    Potassium 6.2 (H) 3.5 - 5.1 mmol/L    Chloride 96 95 - 110 mmol/L    CO2 26 23 - 29 mmol/L    Glucose 315 (H) 70 - 110 mg/dL    BUN, Bld 65 (H) 6 - 20 mg/dL    Creatinine 12.7 (H) 0.5 - 1.4 mg/dL    Calcium 9.4 8.7 - 10.5 mg/dL    Total Protein 7.8 6.0 - 8.4 g/dL    Albumin 3.7 3.5 - 5.2 g/dL    Total Bilirubin 0.7 0.1 - 1.0 mg/dL    Alkaline Phosphatase 110 55 - 135 U/L    AST 19 10 - 40 U/L    ALT 20 10 - 44 U/L    Anion Gap 16 8 - 16 mmol/L    eGFR if African American 4 (A) >60 mL/min/1.73 m^2    eGFR if non African American 3 (A) >60 mL/min/1.73 m^2   Protime-INR    Collection Time: 06/09/19  9:00 AM   Result Value Ref Range    Prothrombin Time 10.4 9.0 - 12.5 sec    INR 1.0 0.8 - 1.2   Troponin I    Collection Time: 06/09/19  9:00 AM   Result Value Ref Range    Troponin I 0.394 (H) 0.000 - 0.026 ng/mL   B-Type natriuretic peptide (BNP)    Collection Time: 06/09/19  9:00 AM   Result Value Ref Range    BNP 2,443 (H) 0 - 99 pg/mL   Lipase    Collection Time: 06/09/19  9:00 AM   Result Value Ref Range    Lipase 55 4 - 60 U/L   Magnesium    Collection Time: 06/09/19  9:00 AM   Result Value Ref Range    Magnesium 2.0 1.6 - 2.6 mg/dL   Phosphorus    Collection Time: 06/09/19  9:00 AM   Result Value Ref Range    Phosphorus 7.5 (H) 2.7 - 4.5 mg/dL   Lactic acid, plasma    Collection Time: 06/09/19  9:00 AM   Result Value Ref Range    Lactate (Lactic Acid) 0.9 0.5 - 2.2 mmol/L   Type & Screen    Collection  Time: 06/09/19  9:00 AM   Result Value Ref Range    Group & Rh O POS     Indirect Micaela NEG            Diagnostic Tests:     cxr-  Cardiomegaly with prominent central pulmonary vasculature and mildly accentuated interstitial markings suggestive of interstitial edema, similar to prior examination         Assessment:     Gilda Schroeder is a 44 y.o. 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  Plan:   1.emergent hd today. Plan hd again in am.  Can be MWF here.  2.UF more today but at low blood flow.  Pt overloaded. DO not think pt is following her diet or adjusting edw. 10kg from baseline today.  3.no epo for now.  4.hold nitro paste on hd.  5.2k bath today.  6.add nephrocap.  7.ck phos.  8.advise pt not to leave bandage on arms for days like she has been doing.    Jaki Leo

## 2019-06-09 NOTE — Clinical Note
Catheter is inserted into the ostium   right coronary artery. Angiography performed  in multiple views. Angiography performed via power injection with .

## 2019-06-09 NOTE — CONSULTS
[facility]  Cardiology  Consult Note    Patient Name: Gilda Schroeder  MRN: [id number]  Admission Date: 6/9/2019  Hospital Length of Stay: 0 days  Code Status: Full Code   Attending Provider: Elroy Batres MD   Consulting Provider: Qasim Guzman MD  Primary Care Physician: Primary Doctor No  Principal Problem:Chest pain    Patient information was obtained from patient and ER records.     Inpatient consult to Cardiology  Consult performed by: Qasim Guzman MD  Consult ordered by: Lb Bourgeois MD        Subjective:     Chief Complaint:  Chest pain     HPI:   This is a 44 y.o. Female, with a PMHx of peritoneal dialysis and end stage renal disease on hemodialysis, who presents to the Emergency Department with a cc of upper abdomianl pain since last night. Patient reports associated nausea and vomiting. She also reports shortness of breath and chest tightness which began on her drive to the Emergency Department. She denies hematemesis and hematochezia. There were no alleviating or worsening factor reported. She has a past surgical history of stent placement x2. Her last dialysis treatment was 2 days ago.  Her cardiac history significant for the fact that she had RCA PCI done in 2017.  She had a repeat coronary angiogram performed in March of 2017 which showed patent RCA stents and nonobstructive coronary artery disease.  Her recent echocardiogram had shown an ejection fraction of 50%.  Her presenting BNP and troponin are elevated.  Initial troponin is 0.3 and BNP is 2 443  Her EKG shows normal sinus rhythm with ST abnormalities and depression suggestive of lateral ischemia.  Patient is undergoing hemodialysis when I saw her.  She states that the reason she came to the emergency room was uncontrollable vomiting.  She also states that she had some chest pains.  She is a poor historian and is unable to characterize the chest pain but states that it is left-sided.  She states that it is  reproducible when I pressed on the location of the chest pain, however at the same time she states that this is similar to the pain she had when she had her PCI in 2017 as well as similar to the pain she had when she had a recent angiogram in 2019 which showed nonobstructive coronary artery disease.  Denies orthopnea or PND.  Does have shortness of breath.      Results for GABY SINGER (MRN [id number]) as of 6/9/2019 15:11   Ref. Range 6/9/2019 09:00 6/9/2019 13:27   Troponin I Latest Ref Range: 0.000 - 0.026 ng/mL 0.394 (H)    BNP Latest Ref Range: 0 - 99 pg/mL 2,443 (H)            ================================================================================  Coronary angiogram in March of 2019:    LV GRAM:  The left ventricle appears normal in size.  The global left ventricular systolic function appears normal with a visually estimated ejection fraction of 55% to 60%.  There are no wall motion abnormalities.  There is no mitral regurgitation.  The EDP is 25 to 30 mmHg.  There is no significant gradient during pullback across the aortic valve.     ANGIOGRAPHIC FINDINGS:  LEFT MAIN:  Normal.     LAD:  This is a medium size vessel, calcified in its proximal segment with a 30% to 40% stenosis at this level with an FFR of 0.91.  The rest of the LAD and its branches are free of significant disease.     LEFT CIRCUMFLEX:  This is a medium to large size nondominant vessel with a medium size obtuse marginal branch that has a 40% to 50% stenosis in the proximal segment.  FFR of this lesion was 0.87.     RCA:  This is a medium size vessel with widely patent stent in the mid and distal segments.  The rest of the RCA is medium to small size vessel, free of significant disease.     CONCLUSIONS:  Nonobstructive coronary artery disease with widely patent stent in the mid and distal RCA.     RECOMMENDATIONS:  1.  Maximize medical therapy.  2.  Aggressive risk factor  modification.    ==============================================================================    Stress test in March 2019:    · There is a moderate to large area of moderate to severe, reversible, perfusion deficit located in the inferior and inferolateral wall consistent with ischemia.  · There is a small area of mild to moderate, reversible, perfusion deficit located in the anteroseptal wall consistent with ischemia.  · The EKG portion of this study is negative for myocardial ischemia.  · An ejection fraction of 52 % at rest  · There is a prior study available for comparison.  · As compared to the previous study, there are significant changes - Above mentioned ischemia    2D echocardiogram in March 2019:    · Mild left atrial enlargement.  · Mild concentric left ventricular hypertrophy.  · Low normal left ventricular systolic function. The estimated ejection fraction is 50%  · Normal LV diastolic function.  · Normal right ventricular systolic function.  · Mild mitral regurgitation.  · Mild tricuspid regurgitation.  · Normal central venous pressure (3 mm Hg).  · The estimated PA systolic pressure is 34 mm Hg    Coronary angiogram in December 2017:    NOTATIONS:  CAD as above.  Successful PCI of the lesions in the mid and distal RCA with drug eluting  stents.    Past Medical History:   Diagnosis Date    Allergy     morphine    Anemia     Anticoagulant long-term use     heparin    CAD (coronary artery disease)     stent x 2    Diabetes mellitus     Diabetes mellitus with ESRD (end-stage renal disease) 7/5/2016    Disorder of kidney and ureter     ESRD (end stage renal disease) on dialysis     Gastroesophageal reflux disease without esophagitis 1/10/2018    HTN (hypertension)     Obesity     Peritonitis associated with peritoneal dialysis 08/2017       Past Surgical History:   Procedure Laterality Date    ANGIOGRAM-CORONARY N/A 12/13/2017    [facility] CATH    AV  FISTULA PLACEMENT       SECTION      EGD (ESOPHAGOGASTRODUODENOSCOPY) N/A 3/14/2019    Performed by Perry [facility]burn [facility] at [facility] ENDO    EXCISION-PILONIDAL CYST N/A 2016    [facility] OR    Fractional Flow [place] (FFR), Coronary  3/16/2019    Performed by [facility] Crowder MD at [facility] CATH    heart stent  2017    INSERTION-CATHETER-HEMODIALYSIS N/A 2017    Performed by Miles Bond MD at [facility] OR    Left heart cath Right 3/16/2019    Performed by [facility] Crowder MD at [facility] CATH    PLACEMENT PERITONEAL DIALYSIS CATHETER      PORTACATH PLACEMENT      for dialysis; removed    REMOVAL-CATHETER-DIALYSIS-PERITONEAL N/A 2017    Performed by Miles Bond MD at [facility] OR    SKIN GRAFT  10/2017    TUBAL LIGATION         Review of patient's allergies indicates:   Allergen Reactions    Morphine Other (See Comments)     Heart rate slows       No current facility-administered medications on file prior to encounter.      Current Outpatient Medications on File Prior to Encounter   Medication Sig    amLODIPine (NORVASC) 10 MG tablet Take 10 mg by mouth once daily.    aspirin (ECOTRIN) 81 MG EC tablet Take 81 mg by mouth once daily.    B,C/FERROUS FUM/FA/D3/ZINC OX (PRORENAL ORAL) Take 1 tablet by mouth once daily.    calcitRIOL (ROCALTROL) 0.5 MCG Cap Take 0.5 mcg by mouth once daily.    carvedilol (COREG) 25 MG tablet TAKE ONE TABLET BY MOUTH TWICE DAILY    cloNIDine (CATAPRES) 0.2 MG tablet Take 0.2 mg by mouth 2 (two) times daily.    clopidogrel (PLAVIX) 75 mg tablet Take 75 mg by mouth once daily.    hydrALAZINE (APRESOLINE) 100 MG tablet Take 100 mg by mouth 3 (three) times daily.     hydrOXYzine pamoate (VISTARIL) 25 MG Cap Take 25 mg by mouth 2 (two) times daily. Taking bid    insulin aspart U-100 (NOVOLOG) 100 unit/mL injection Per home dose Sliding Scale TID with meals.    insulin detemir U-100 (LEVEMIR) 100 unit/mL  injection Inject 5 Units into the skin 2 (two) times daily.    metoclopramide HCl (REGLAN) 5 MG tablet Take 5 mg by mouth 4 (four) times daily.    pantoprazole (PROTONIX) 40 MG tablet Take 1 tablet (40 mg total) by mouth once daily.    promethazine (PHENERGAN) 25 MG tablet Take 1 tablet (25 mg total) by mouth every 6 (six) hours as needed for Nausea.    rosuvastatin (CRESTOR) 5 MG tablet Take 1 tablet (5 mg total) by mouth once daily.    sevelamer carbonate (RENVELA) 800 mg Tab Take 800 mg by mouth 3 (three) times daily with meals.    ondansetron (ZOFRAN) 4 MG tablet Take 1 tablet (4 mg total) by mouth 2 (two) times daily.     Family History     Problem Relation (Age of Onset)    Diabetes Mother    No Known Problems Father, Sister, Brother        Tobacco Use    Smoking status: Former Smoker     Packs/day: 0.50     Years: 12.00     Pack years: 6.00     Types: Cigarettes     Last attempt to quit: 2014     Years since quittin.4   Substance and Sexual Activity    Alcohol use: No    Drug use: No    Sexual activity: Yes     Partners: Male     Review of Systems   Constitution: Negative.   HENT: Negative.    Eyes: Negative.    Cardiovascular: Positive for chest pain and dyspnea on exertion.   Respiratory: Positive for shortness of breath.    Endocrine: Negative.    Hematologic/Lymphatic: Negative.    Skin: Negative.    Musculoskeletal: Negative.    Gastrointestinal: Positive for abdominal pain, nausea and vomiting.   Genitourinary: Negative.    Neurological: Negative.    Psychiatric/Behavioral: Negative.    Allergic/Immunologic: Negative.      Objective:     Vital Signs (Most Recent):  Temp: 98.4 °F (36.9 °C) (19 1301)  Pulse: 103 (19 1331)  Resp: 20 (19 1331)  BP: (!) 169/77 (19 1331)  SpO2: 98 % (19 1331) Vital Signs (24h Range):  Temp:  [98 °F (36.7 °C)-98.6 °F (37 °C)] 98.4 °F (36.9 °C)  Pulse:  [] 103  Resp:  [16-34] 20  SpO2:  [76 %-100 %] 98 %  BP: (165-255)/()  169/77     Weight: 83.9 kg (185 lb)  Body mass index is 30.79 kg/m².    SpO2: 98 %  O2 Device (Oxygen Therapy): nasal cannula    No intake or output data in the 24 hours ending 06/09/19 1535    Lines/Drains/Airways     Drain                 Hemodialysis AV Fistula 10/13/18 2350 Left upper arm 238 days          Airway                 Airway - Non-Surgical 03/14/19 0815 Nasal Cannula 87 days          Peripheral Intravenous Line                 Peripheral IV - Single Lumen 06/09/19 0744 20 G Right Antecubital less than 1 day                Physical Exam   Constitutional: She is oriented to person, place, and time. She appears well-developed and well-nourished.   HENT:   Head: Normocephalic.   Eyes: Pupils are equal, round, and reactive to light.   Neck: Normal range of motion. Neck supple.   Cardiovascular: Normal rate and regular rhythm.   Pulmonary/Chest: Effort normal and breath sounds normal. She exhibits tenderness.   Abdominal: Soft. Normal appearance and bowel sounds are normal. There is no tenderness.   Musculoskeletal: Normal range of motion. She exhibits edema.   Neurological: She is alert and oriented to person, place, and time.   Skin: Skin is warm.   Psychiatric: She has a normal mood and affect.   Nursing note and vitals reviewed.      Significant Labs:   BMP:   Recent Labs   Lab 06/09/19  0900   *      K 6.2*   CL 96   CO2 26   BUN 65*   CREATININE 12.7*   CALCIUM 9.4   MG 2.0   , CMP   Recent Labs   Lab 06/09/19  0900      K 6.2*   CL 96   CO2 26   *   BUN 65*   CREATININE 12.7*   CALCIUM 9.4   PROT 7.8   ALBUMIN 3.7   BILITOT 0.7   ALKPHOS 110   AST 19   ALT 20   ANIONGAP 16   ESTGFRAFRICA 4*   EGFRNONAA 3*   , CBC   Recent Labs   Lab 06/09/19  0900   WBC 10.72   HGB 10.1*   HCT 31.6*      , Lipid Panel No results for input(s): CHOL, HDL, LDLCALC, TRIG, CHOLHDL in the last 48 hours.,   Pathology Results  (Last 10 years)    None      , Troponin   Recent Labs   Lab  06/09/19  0900   TROPONINI 0.394*    and All pertinent lab results from the last 24 hours have been reviewed.    Significant Imaging: All imaging studies reviewed as above.    Assessment and Plan:     * Chest pain  Patient came in with chest pain.  Poor historian.  Has history of coronary artery disease and multiple risk factors for coronary artery disease.  Recent angiogram was performed in March of 2019 which showed patent RCA stents, nonobstructive disease in LAD and circumflex.  initial troponin is elevated at 0.3.  Trend troponins x3.  Initial EKG shows lateral ST depressions.  Recheck EKG with troponins.  Further workup to be determined by results of see year troponins and her clinical progression.  Check 2D echocardiogram    CAD (coronary artery disease)  Patient with known coronary artery disease status post PCI of RCA in 2017.  Recent coronary angiogram 3 months ago showed nonobstructive coronary artery disease in the LAD and circumflex as well as patent stents in the RCA.    Controlled type 2 diabetes mellitus with chronic kidney disease  Per primary team        VTE Risk Mitigation (From admission, onward)        Ordered     IP VTE HIGH RISK PATIENT  Once      06/09/19 1455     Place sequential compression device  Until discontinued      06/09/19 1455          Thank you for your consult. I will follow-up with patient. Please contact us if you have any additional questions.    Qasim Guzman MD  Cardiology   [facility] [place]

## 2019-06-09 NOTE — Clinical Note
Catheter is inserted into the ostium   left main. Angiography performed  in multiple views. Angiography performed via power injection with .

## 2019-06-09 NOTE — HPI
This is a 44 y.o. Female, with a PMHx of peritoneal dialysis and end stage renal disease on hemodialysis, who presents to the Emergency Department with a cc of upper abdomianl pain since last night. Patient reports associated nausea and vomiting. She also reports shortness of breath and chest tightness which began on her drive to the Emergency Department. She denies hematemesis and hematochezia. There were no alleviating or worsening factor reported. She has a past surgical history of stent placement x2. Her last dialysis treatment was 2 days ago.  Her cardiac history significant for the fact that she had RCA PCI done in 2017.  She had a repeat coronary angiogram performed in March of 2017 which showed patent RCA stents and nonobstructive coronary artery disease.  Her recent echocardiogram had shown an ejection fraction of 50%.  Her presenting BNP and troponin are elevated.  Initial troponin is 0.3 and BNP is 2 443  Her EKG shows normal sinus rhythm with ST abnormalities and depression suggestive of lateral ischemia.  Patient is undergoing hemodialysis when I saw her.  She states that the reason she came to the emergency room was uncontrollable vomiting.  She also states that she had some chest pains.  She is a poor historian and is unable to characterize the chest pain but states that it is left-sided.  She states that it is reproducible when I pressed on the location of the chest pain, however at the same time she states that this is similar to the pain she had when she had her PCI in 2017 as well as similar to the pain she had when she had a recent angiogram in 2019 which showed nonobstructive coronary artery disease.  Denies orthopnea or PND.  Does have shortness of breath.      Results for GABY SINGER (MRN [id number]) as of 6/9/2019 15:11   Ref. Range 6/9/2019 09:00 6/9/2019 13:27   Troponin I Latest Ref Range: 0.000 - 0.026 ng/mL 0.394 (H)    BNP Latest Ref Range: 0 - 99 pg/mL 2,443 (H)             ================================================================================  Coronary angiogram in March of 2019:    LV GRAM:  The left ventricle appears normal in size.  The global left ventricular systolic function appears normal with a visually estimated ejection fraction of 55% to 60%.  There are no wall motion abnormalities.  There is no mitral regurgitation.  The EDP is 25 to 30 mmHg.  There is no significant gradient during pullback across the aortic valve.     ANGIOGRAPHIC FINDINGS:  LEFT MAIN:  Normal.     LAD:  This is a medium size vessel, calcified in its proximal segment with a 30% to 40% stenosis at this level with an FFR of 0.91.  The rest of the LAD and its branches are free of significant disease.     LEFT CIRCUMFLEX:  This is a medium to large size nondominant vessel with a medium size obtuse marginal branch that has a 40% to 50% stenosis in the proximal segment.  FFR of this lesion was 0.87.     RCA:  This is a medium size vessel with widely patent stent in the mid and distal segments.  The rest of the RCA is medium to small size vessel, free of significant disease.     CONCLUSIONS:  Nonobstructive coronary artery disease with widely patent stent in the mid and distal RCA.     RECOMMENDATIONS:  1.  Maximize medical therapy.  2.  Aggressive risk factor modification.    ==============================================================================    Stress test in March 2019:    · There is a moderate to large area of moderate to severe, reversible, perfusion deficit located in the inferior and inferolateral wall consistent with ischemia.  · There is a small area of mild to moderate, reversible, perfusion deficit located in the anteroseptal wall consistent with ischemia.  · The EKG portion of this study is negative for myocardial ischemia.  · An ejection fraction of 52 % at rest  · There is a prior study available for comparison.  · As compared to the previous study, there are  significant changes - Above mentioned ischemia    2D echocardiogram in March 2019:    · Mild left atrial enlargement.  · Mild concentric left ventricular hypertrophy.  · Low normal left ventricular systolic function. The estimated ejection fraction is 50%  · Normal LV diastolic function.  · Normal right ventricular systolic function.  · Mild mitral regurgitation.  · Mild tricuspid regurgitation.  · Normal central venous pressure (3 mm Hg).  · The estimated PA systolic pressure is 34 mm Hg    Coronary angiogram in December 2017:    NOTATIONS:  CAD as above.  Successful PCI of the lesions in the mid and distal RCA with drug eluting  stents.

## 2019-06-09 NOTE — SUBJECTIVE & OBJECTIVE
Past Medical History:   Diagnosis Date    Allergy     morphine    Anemia     Anticoagulant long-term use     heparin    CAD (coronary artery disease)     stent x 2    Diabetes mellitus     Diabetes mellitus with ESRD (end-stage renal disease) 2016    Disorder of kidney and ureter     ESRD (end stage renal disease) on dialysis     Gastroesophageal reflux disease without esophagitis 1/10/2018    HTN (hypertension)     Obesity     Peritonitis associated with peritoneal dialysis 2017       Past Surgical History:   Procedure Laterality Date    ANGIOGRAM-CORONARY N/A 2017    Performed [facility] Crowder [facility] at [facility] CATH    AV FISTULA PLACEMENT       SECTION      EGD (ESOPHAGOGASTRODUODENOSCOPY) N/A 3/14/2019    Performed by Perry Washburn MD at [facility] ENDO    EXCISION-PILONIDAL CYST N/A 2016    [facility] OR    Fractional Flow [place] (FFR), Coronary  3/16/2019    Performed by Lucho Crowder MD at [facility] CATH    heart stent  2017    INSERTION-CATHETER-HEMODIALYSIS N/A 2017    Performed by Miles Bond MD at [facility] OR    Left heart cath Right 3/16/2019    Performed by [facility] Crowder [facility] at [facility] CATH    PLACEMENT PERITONEAL DIALYSIS CATHETER      PORTACATH PLACEMENT      for dialysis; removed    REMOVAL-CATHETER-DIALYSIS-PERITONEAL N/A 2017    Performed by Miles Bond MD at [facility] OR    SKIN GRAFT  10/2017    TUBAL LIGATION         Review of patient's allergies indicates:   Allergen Reactions    Morphine Other (See Comments)     Heart rate slows       No current facility-administered medications on file prior to encounter.      Current Outpatient Medications on File Prior to Encounter   Medication Sig    amLODIPine (NORVASC) 10 MG tablet Take 10 mg by mouth once daily.    aspirin (ECOTRIN) 81 MG EC tablet Take 81 mg by mouth once daily.    B,C/FERROUS FUM/FA/D3/ZINC OX (PRORENAL ORAL) Take  1 tablet by mouth once daily.    calcitRIOL (ROCALTROL) 0.5 MCG Cap Take 0.5 mcg by mouth once daily.    carvedilol (COREG) 25 MG tablet TAKE ONE TABLET BY MOUTH TWICE DAILY    cloNIDine (CATAPRES) 0.2 MG tablet Take 0.2 mg by mouth 2 (two) times daily.    clopidogrel (PLAVIX) 75 mg tablet Take 75 mg by mouth once daily.    hydrALAZINE (APRESOLINE) 100 MG tablet Take 100 mg by mouth 3 (three) times daily.     hydrOXYzine pamoate (VISTARIL) 25 MG Cap Take 25 mg by mouth 2 (two) times daily. Taking bid    insulin aspart U-100 (NOVOLOG) 100 unit/mL injection Per home dose Sliding Scale TID with meals.    insulin detemir U-100 (LEVEMIR) 100 unit/mL injection Inject 5 Units into the skin 2 (two) times daily.    metoclopramide HCl (REGLAN) 5 MG tablet Take 5 mg by mouth 4 (four) times daily.    pantoprazole (PROTONIX) 40 MG tablet Take 1 tablet (40 mg total) by mouth once daily.    promethazine (PHENERGAN) 25 MG tablet Take 1 tablet (25 mg total) by mouth every 6 (six) hours as needed for Nausea.    rosuvastatin (CRESTOR) 5 MG tablet Take 1 tablet (5 mg total) by mouth once daily.    sevelamer carbonate (RENVELA) 800 mg Tab Take 800 mg by mouth 3 (three) times daily with meals.    ondansetron (ZOFRAN) 4 MG tablet Take 1 tablet (4 mg total) by mouth 2 (two) times daily.     Family History     Problem Relation (Age of Onset)    Diabetes Mother    No Known Problems Father, Sister, Brother        Tobacco Use    Smoking status: Former Smoker     Packs/day: 0.50     Years: 12.00     Pack years: 6.00     Types: Cigarettes     Last attempt to quit:      Years since quittin.4   Substance and Sexual Activity    Alcohol use: No    Drug use: No    Sexual activity: Yes     Partners: Male     Review of Systems   Constitution: Negative.   HENT: Negative.    Eyes: Negative.    Cardiovascular: Positive for chest pain and dyspnea on exertion.   Respiratory: Positive for shortness of breath.    Endocrine: Negative.     Hematologic/Lymphatic: Negative.    Skin: Negative.    Musculoskeletal: Negative.    Gastrointestinal: Positive for abdominal pain, nausea and vomiting.   Genitourinary: Negative.    Neurological: Negative.    Psychiatric/Behavioral: Negative.    Allergic/Immunologic: Negative.      Objective:     Vital Signs (Most Recent):  Temp: 98.4 °F (36.9 °C) (06/09/19 1301)  Pulse: 103 (06/09/19 1331)  Resp: 20 (06/09/19 1331)  BP: (!) 169/77 (06/09/19 1331)  SpO2: 98 % (06/09/19 1331) Vital Signs (24h Range):  Temp:  [98 °F (36.7 °C)-98.6 °F (37 °C)] 98.4 °F (36.9 °C)  Pulse:  [] 103  Resp:  [16-34] 20  SpO2:  [76 %-100 %] 98 %  BP: (165-255)/() 169/77     Weight: 83.9 kg (185 lb)  Body mass index is 30.79 kg/m².    SpO2: 98 %  O2 Device (Oxygen Therapy): nasal cannula    No intake or output data in the 24 hours ending 06/09/19 1535    Lines/Drains/Airways     Drain                 Hemodialysis AV Fistula 10/13/18 2350 Left upper arm 238 days          Airway                 Airway - Non-Surgical 03/14/19 0815 Nasal Cannula 87 days          Peripheral Intravenous Line                 Peripheral IV - Single Lumen 06/09/19 0744 20 G Right Antecubital less than 1 day                Physical Exam   Constitutional: She is oriented to person, place, and time. She appears well-developed and well-nourished.   HENT:   Head: Normocephalic.   Eyes: Pupils are equal, round, and reactive to light.   Neck: Normal range of motion. Neck supple.   Cardiovascular: Normal rate and regular rhythm.   Pulmonary/Chest: Effort normal and breath sounds normal. She exhibits tenderness.   Abdominal: Soft. Normal appearance and bowel sounds are normal. There is no tenderness.   Musculoskeletal: Normal range of motion. She exhibits edema.   Neurological: She is alert and oriented to person, place, and time.   Skin: Skin is warm.   Psychiatric: She has a normal mood and affect.   Nursing note and vitals reviewed.      Significant Labs:    BMP:   Recent Labs   Lab 06/09/19 0900   *      K 6.2*   CL 96   CO2 26   BUN 65*   CREATININE 12.7*   CALCIUM 9.4   MG 2.0   , CMP   Recent Labs   Lab 06/09/19 0900      K 6.2*   CL 96   CO2 26   *   BUN 65*   CREATININE 12.7*   CALCIUM 9.4   PROT 7.8   ALBUMIN 3.7   BILITOT 0.7   ALKPHOS 110   AST 19   ALT 20   ANIONGAP 16   ESTGFRAFRICA 4*   EGFRNONAA 3*   , CBC   Recent Labs   Lab 06/09/19 0900   WBC 10.72   HGB 10.1*   HCT 31.6*      , Lipid Panel No results for input(s): CHOL, HDL, LDLCALC, TRIG, CHOLHDL in the last 48 hours.,   Pathology Results  (Last 10 years)    None      , Troponin   Recent Labs   Lab 06/09/19 0900   TROPONINI 0.394*    and All pertinent lab results from the last 24 hours have been reviewed.    Significant Imaging: All imaging studies reviewed as above.

## 2019-06-09 NOTE — HPI
44 yof with hx of ESRD on HD, DM type 2, CAD s/p PCI (2017), HTN, HLD, hx of chronic abdominal pain and gastroparesis presenting with sudden onset abdominal pain with associated intractable nausea, emesis, non radiating chest tightness, and dyspnea since early this AM. Patient denies any fevers, chills, diaphoresis, syncope, visual disturbances, dizziness, palpitations, left arm pain, hemoptysis, diarrhea, or constipation. Last dialysis was Friday, and she get 4x HD per week. No recent travel or sick contacts.    In the ED patient was hypertensive sbp 250s, tachycardic, tachypenic hypoxic 76% requiring O2. Labs significant for BNP 2443, trop 0.39, Cr 12, K 6.2. EKG NRS with ST abnormalities concerning for lateral ischemia. CXR noting pulmonary vascular congestion. Nephrology and cardiology consulted.  paged for admission.

## 2019-06-09 NOTE — ED PROVIDER NOTES
Encounter Date: 2019    SCRIBE #1 NOTE: I, Rj Haas, am scribing for, and in the presence of,  Dr. Bourgeois. I have scribed the following portions of the note - Other sections scribed: HPI, ROS, PE, and ED Management .       History     Chief Complaint   Patient presents with    Shortness of Breath     sob and vomiting since last night.  dialysis pt, last dialysis Friday without complications.  denies diarrhea or fever.   denies hx of resp dx.      CC: stomach pain     HPI:  This is a 44 y.o. Female, with a PMHx of peritoneal dialysis and end stage renal disease on hemodialysis, who presents to the Emergency Department with a cc of upper abdomianl pain since last night. Patient reports associated nausea and vomiting. She also reports shortness of breath and chest tightness which began on her drive to the Emergency Department. She denies hematemesis and hematochezia. There were no alleviating or worsening factor reported. She has a past surgical history of stent placement x2. Her last dialysis treatment was 2 days ago.            Review of patient's allergies indicates:   Allergen Reactions    Morphine Other (See Comments)     Heart rate slows     Past Medical History:   Diagnosis Date    Allergy     morphine    Anemia     Anticoagulant long-term use     heparin    CAD (coronary artery disease)     stent x 2    Diabetes mellitus     Diabetes mellitus with ESRD (end-stage renal disease) 2016    Disorder of kidney and ureter     ESRD (end stage renal disease) on dialysis     Gastroesophageal reflux disease without esophagitis 1/10/2018    HTN (hypertension)     Obesity     Peritonitis associated with peritoneal dialysis 2017     Past Surgical History:   Procedure Laterality Date    ANGIOGRAM-CORONARY N/A 2017    Performed by [facility]o [facility]der [facility] at [facility] CATH    AV FISTULA PLACEMENT       SECTION      EGD (ESOPHAGOGASTRODUODENOSCOPY) N/A 3/14/2019    Performed by  Perry Washburn [facility] at [facility] ENDO    EXCISION-PILONIDAL CYST N/A 2016    [facility] OR    Fractional Flow [place] (FFR), Coronary  3/16/2019    Performed by [facility]o [facility]der [facility] at [facility] CATH    heart stent  2017    INSERTION-CATHETER-HEMODIALYSIS N/A 2017    Performed by Miles Bond MD at [facility] OR    Left heart cath Right 3/16/2019    Performed by [facility] at [facility] CATH    PLACEMENT PERITONEAL DIALYSIS CATHETER      PORTACATH PLACEMENT      for dialysis; removed    REMOVAL-CATHETER-DIALYSIS-PERITONEAL N/A 2017    Performed by Miles [facility] at [facility] OR    SKIN GRAFT  10/2017    TUBAL LIGATION       Family History   Problem Relation Age of Onset    Diabetes Mother     No Known Problems Father     No Known Problems Sister     No Known Problems Brother      Social History     Tobacco Use    Smoking status: Former Smoker     Packs/day: 0.50     Years: 12.00     Pack years: 6.00     Types: Cigarettes     Last attempt to quit:      Years since quittin.4   Substance Use Topics    Alcohol use: No    Drug use: No     Review of Systems   Constitutional: Negative for fever.   HENT: Negative for sore throat.    Eyes: Negative for visual disturbance.   Respiratory: Positive for chest tightness and shortness of breath.    Cardiovascular: Negative for chest pain.   Gastrointestinal: Positive for abdominal pain, nausea and vomiting. Negative for blood in stool.        Negative for hematemesis    Genitourinary: Negative for dysuria.   Musculoskeletal: Negative for back pain.   Skin: Negative for rash.   Neurological: Negative for headaches.       Physical Exam     Initial Vitals   BP Pulse Resp Temp SpO2   19 0733 19 0727 19 0727 19 0727 19 0727   (!) 170/113 (!) 114 (!) 32 98 °F (36.7 °C) (!) 76 %      MAP       --                Physical Exam    Nursing note and vitals  reviewed.  Constitutional: She appears well-developed and well-nourished. She appears distressed.   HENT:   Head: Normocephalic and atraumatic.   Eyes: EOM are normal. Pupils are equal, round, and reactive to light.   Pale conjunctiva    Neck: Normal range of motion. Neck supple. No thyromegaly present. JVD present.   Cardiovascular: Regular rhythm. Tachycardia present.    Tachycardic rate 115   Pulmonary/Chest: Tachypnea noted. She is in respiratory distress. She has rales.   Oxygen saturation of 76%   Abdominal: Soft. There is tenderness in the right upper quadrant and epigastric area.   Musculoskeletal: Normal range of motion. She exhibits edema. She exhibits no tenderness.   Neurological: She is alert and oriented to person, place, and time. She has normal strength. GCS score is 15. GCS eye subscore is 4. GCS verbal subscore is 5. GCS motor subscore is 6.   Skin: Skin is warm and dry. Capillary refill takes less than 2 seconds. There is pallor.         ED Course   Critical Care  Date/Time: 6/9/2019 12:55 PM  Performed by: Rj Haas  Authorized by: Lb Bourgeois MD   Direct patient critical care time: 15 minutes  Additional history critical care time: 5 minutes  Ordering / reviewing critical care time: 10 minutes  Documentation critical care time: 10 minutes  Consulting other physicians critical care time: 5 minutes  Other critical care time: 8 minutes  Total critical care time (exclusive of procedural time) : 53 minutes  Critical care time was exclusive of separately billable procedures and treating other patients and teaching time.  Critical care was necessary to treat or prevent imminent or life-threatening deterioration of the following conditions: respiratory failure.  Critical care was time spent personally by me on the following activities: development of treatment plan with patient or surrogate, discussions with consultants, interpretation of cardiac output measurements, evaluation of patient's  response to treatment, examination of patient, obtaining history from patient or surrogate, ordering and performing treatments and interventions, ordering and review of laboratory studies, ordering and review of radiographic studies, re-evaluation of patient's condition and review of old charts.        Labs Reviewed   CBC W/ AUTO DIFFERENTIAL - Abnormal; Notable for the following components:       Result Value    RBC 3.50 (*)     Hemoglobin 10.1 (*)     Hematocrit 31.6 (*)     Gran # (ANC) 9.7 (*)     Lymph # 0.7 (*)     Gran% 90.1 (*)     Lymph% 6.4 (*)     Mono% 2.5 (*)     All other components within normal limits   COMPREHENSIVE METABOLIC PANEL - Abnormal; Notable for the following components:    Potassium 6.2 (*)     Glucose 315 (*)     BUN, Bld 65 (*)     Creatinine 12.7 (*)     eGFR if  4 (*)     eGFR if non  3 (*)     All other components within normal limits   TROPONIN I - Abnormal; Notable for the following components:    Troponin I 0.394 (*)     All other components within normal limits   B-TYPE NATRIURETIC PEPTIDE - Abnormal; Notable for the following components:    BNP 2,443 (*)     All other components within normal limits   PHOSPHORUS - Abnormal; Notable for the following components:    Phosphorus 7.5 (*)     All other components within normal limits   POCT GLUCOSE - Abnormal; Notable for the following components:    POCT Glucose 146 (*)     All other components within normal limits   PROTIME-INR   LIPASE   MAGNESIUM   LACTIC ACID, PLASMA   TYPE & SCREEN   POCT GLUCOSE MONITORING CONTINUOUS   POCT GLUCOSE MONITORING CONTINUOUS     EKG Readings: (Independently Interpreted)   Initial Reading: No STEMI. Rhythm: Sinus Arrhythmia. Heart Rate: 98. Ectopy: No Ectopy. Conduction: Normal. T Waves: Normal. Clinical Impression: Sinus Arrhythmia   Nonspecific ST abnormalities. Lateral leads     ECG Results          EKG 12-lead (Final result)  Result time 06/09/19 17:05:26    Final  result by Interface, Lab In [facility] (06/09/19 17:05:26)                 Narrative:    Test Reason : R06.02,    Vent. Rate : 098 BPM     Atrial Rate : 098 BPM     P-R Int : 144 ms          QRS Dur : 068 ms      QT Int : 340 ms       P-R-T Axes : 061 053 102 degrees     QTc Int : 434 ms    Normal sinus rhythm with sinus arrhythmia  Septal infarct ,age undetermined  ST and T wave abnormality, consider lateral ischemia  Abnormal ECG  When compared with ECG of 19-JUL-2016 10:59,  Significant changes have occurred  Confirmed by Megan NICHOLSON, Qasim NAVARRO ([id number]) on 6/9/2019 5:05:17 PM    Referred By: [email or web address]   SELF           Confirmed By:Qasim Guzman MD                            Imaging Results          X-Ray Chest AP Portable (Final result)  Result time 06/09/19 07:50:18    Final result by Rhett Barbosa MD (06/09/19 07:50:18)                 Impression:      Cardiomegaly with prominent central pulmonary vasculature and mildly accentuated interstitial markings suggestive of interstitial edema, similar to prior examination.      Electronically signed by: Rhett Barbosa MD  Date:    06/09/2019  Time:    07:50             Narrative:    EXAMINATION:  XR CHEST AP PORTABLE    CLINICAL HISTORY:  SOB;    TECHNIQUE:  Single frontal view of the chest was performed.    COMPARISON:  Chest radiograph 03/11/2019    FINDINGS:  Cardiac monitoring leads overlie the chest.  The cardiomediastinal silhouette is mildly enlarged, similar to prior examination.  There is prominence of the central pulmonary vasculature as well as mildly accentuated interstitial markings bilaterally.  No large confluent airspace consolidation or pleural effusion appreciated.  There is no pneumothorax.  The visualized osseous structures appear intact.                                 Medical Decision Making:   Initial Assessment:   Gilda Schroeder is a 44 y.o. female presenting for an emergent evaluation of abdominal pain, nausea, vomiting, and shortness  of breath. Exam revealed epigastric RUQ tenderness, tachypenic, tachycardic rate of 115, oxygen saturation of 76%, pale conjunctiva, and pallor. Will obtain labs, and given the extent of pain, imaging. I will treat the pt's symptoms appropriately and reassess.    Differential Diagnosis:   Differential Diagnosis includes, but is not limited to:  AAA, aortic dissection, mesenteric ischemia, perforated viscous, MI/ACS, SBO/volvulus, incarcerated/strangulated hernia, intussusception, ileus, appendicitis, cholecystitis, cholangitis, diverticulitis, esophagitis, hepatitis, nephrolithiasis, pancreatitis, gastroenteritis, colitis, IBD/IBS, biliary colic, GERD, PUD, constipation, UTI/pyelonephritis,  disorder.    Clinical Tests:   Lab Tests: Reviewed and Ordered  Radiological Study: Ordered and Reviewed  Medical Tests: Reviewed and Ordered  ED Management:  11:51 AM  Spoke with Felipa about patient          patient arrived in respiratory distress. Likely hypertensive emergency with pulmonary edema.  Patient may be noncompliant with her home dialysis.  Potassium is 6.2.  Evidence of volume overload.  Patient is uremic.  Discussed the case with Dr. Leo with Nephrology who came in and assessed the patient in the emergency room.  Will perform urgent dialysis today.  With blood pressure control patient's respiratory status greatly improved.  Has been weaned down to a nasal cannula.  Patient still has some chest pain but it appears to be left sternal border and reproducible.  Possibly related to repetitive vomiting since last night.  Patient has a history of chronic upper abdominal pain and vomiting.  Multiple recent imaging this year regarding this.  On repeat exam patient is benign abdominal exam.  No vomiting at this time.  No further workup for this at this time.              Clinical Impression:     1. Hypertensive crisis    2. SOB (shortness of breath)    3. Acute congestive heart failure, unspecified heart failure type    4.  Hypoxia    5. Respiratory distress    6. ESRD on dialysis    7. Epigastric abdominal pain    8. Vomiting, intractability of vomiting not specified, presence of nausea not specified, unspecified vomiting type    9. Uremia    10. Hyperkalemia    11. Chest pain            Disposition:   Disposition: Admitted  Condition: Stable           Scribe attestation: I, Lb Bourgeois, personally performed the services described in this documentation. All medical record entries made by the scribe were at my direction and in my presence.  I have reviewed the chart and agree that the record reflects my personal performance and is accurate and complete.               Lb Bourgeois MD  06/09/19 [id number]

## 2019-06-09 NOTE — ASSESSMENT & PLAN NOTE
Patient came in with chest pain.  Poor historian.  Has history of coronary artery disease and multiple risk factors for coronary artery disease.  Recent angiogram was performed in March of 2019 which showed patent RCA stents, nonobstructive disease in LAD and circumflex.  initial troponin is elevated at 0.3.  Trend troponins x3.  Initial EKG shows lateral ST depressions.  Recheck EKG with troponins.  Further workup to be determined by results of see year troponins and her clinical progression.  Check 2D echocardiogram

## 2019-06-09 NOTE — Clinical Note
229 ml injected throughout the case. 71 mL total wasted during the case. 300 mL total used in the case.

## 2019-06-10 LAB
ALBUMIN SERPL BCP-MCNC: 4.3 G/DL (ref 3.5–5.2)
ALP SERPL-CCNC: 106 U/L (ref 55–135)
ALT SERPL W/O P-5'-P-CCNC: 24 U/L (ref 10–44)
ANION GAP SERPL CALC-SCNC: 20 MMOL/L (ref 8–16)
AORTIC ROOT ANNULUS: 2.97 CM
AORTIC VALVE CUSP SEPERATION: 1.8 CM
APTT BLDCRRT: 23.6 SEC (ref 21–32)
ASCENDING AORTA: 2.62 CM
AST SERPL-CCNC: 24 U/L (ref 10–40)
AV INDEX (PROSTH): 0.68
AV MEAN GRADIENT: 4.69 MMHG
AV PEAK GRADIENT: 9.73 MMHG
AV VALVE AREA: 2.43 CM2
AV VELOCITY RATIO: 0.7
BASOPHILS # BLD AUTO: 0.03 K/UL (ref 0–0.2)
BASOPHILS NFR BLD: 0.2 % (ref 0–1.9)
BILIRUB SERPL-MCNC: 0.8 MG/DL (ref 0.1–1)
BSA FOR ECHO PROCEDURE: 1.9 M2
BUN SERPL-MCNC: 33 MG/DL (ref 6–20)
CALCIUM SERPL-MCNC: 11.2 MG/DL (ref 8.7–10.5)
CHLORIDE SERPL-SCNC: 89 MMOL/L (ref 95–110)
CO2 SERPL-SCNC: 24 MMOL/L (ref 23–29)
CREAT SERPL-MCNC: 8.7 MG/DL (ref 0.5–1.4)
CV ECHO LV RWT: 0.47 CM
DIFFERENTIAL METHOD: ABNORMAL
DOP CALC AO PEAK VEL: 1.56 M/S
DOP CALC AO VTI: 25.28 CM
DOP CALC LVOT AREA: 3.56 CM2
DOP CALC LVOT DIAMETER: 2.13 CM
DOP CALC LVOT PEAK VEL: 1.1 M/S
DOP CALC LVOT STROKE VOLUME: 61.44 CM3
DOP CALCLVOT PEAK VEL VTI: 17.25 CM
E WAVE DECELERATION TIME: 122.77 MSEC
E/A RATIO: 2.69
E/E' RATIO: 41.75
ECHO LV POSTERIOR WALL: 1.13 CM (ref 0.6–1.1)
EOSINOPHIL # BLD AUTO: 0 K/UL (ref 0–0.5)
EOSINOPHIL NFR BLD: 0.1 % (ref 0–8)
ERYTHROCYTE [DISTWIDTH] IN BLOOD BY AUTOMATED COUNT: 14.6 % (ref 11.5–14.5)
EST. GFR  (AFRICAN AMERICAN): 6 ML/MIN/1.73 M^2
EST. GFR  (NON AFRICAN AMERICAN): 5 ML/MIN/1.73 M^2
FRACTIONAL SHORTENING: 24 % (ref 28–44)
GLUCOSE SERPL-MCNC: 308 MG/DL (ref 70–110)
HAV IGM SERPL QL IA: NEGATIVE
HBV CORE AB SERPL QL IA: NEGATIVE
HBV SURFACE AB SER-ACNC: POSITIVE M[IU]/ML
HBV SURFACE AG SERPL QL IA: NEGATIVE
HCT VFR BLD AUTO: 34.2 % (ref 37–48.5)
HGB BLD-MCNC: 11 G/DL (ref 12–16)
INTERVENTRICULAR SEPTUM: 1.13 CM (ref 0.6–1.1)
IVRT: 0.07 MSEC
LA MAJOR: 5.22 CM
LA MINOR: 4.75 CM
LA WIDTH: 3.54 CM
LEFT ATRIUM SIZE: 3.94 CM
LEFT ATRIUM VOLUME INDEX: 32.3 ML/M2
LEFT ATRIUM VOLUME: 58.97 CM3
LEFT INTERNAL DIMENSION IN SYSTOLE: 3.7 CM (ref 2.1–4)
LEFT VENTRICLE DIASTOLIC VOLUME INDEX: 59.87 ML/M2
LEFT VENTRICLE DIASTOLIC VOLUME: 109.42 ML
LEFT VENTRICLE MASS INDEX: 111.7 G/M2
LEFT VENTRICLE SYSTOLIC VOLUME INDEX: 31.8 ML/M2
LEFT VENTRICLE SYSTOLIC VOLUME: 58.07 ML
LEFT VENTRICULAR INTERNAL DIMENSION IN DIASTOLE: 4.84 CM (ref 3.5–6)
LEFT VENTRICULAR MASS: 204.05 G
LV LATERAL E/E' RATIO: 41.75
LV SEPTAL E/E' RATIO: 41.75
LYMPHOCYTES # BLD AUTO: 1.2 K/UL (ref 1–4.8)
LYMPHOCYTES NFR BLD: 8.5 % (ref 18–48)
MAGNESIUM SERPL-MCNC: 1.9 MG/DL (ref 1.6–2.6)
MCH RBC QN AUTO: 29.1 PG (ref 27–31)
MCHC RBC AUTO-ENTMCNC: 32.2 G/DL (ref 32–36)
MCV RBC AUTO: 91 FL (ref 82–98)
MONOCYTES # BLD AUTO: 0.4 K/UL (ref 0.3–1)
MONOCYTES NFR BLD: 2.6 % (ref 4–15)
MV PEAK A VEL: 0.62 M/S
MV PEAK E VEL: 1.67 M/S
NEUTROPHILS # BLD AUTO: 12.1 K/UL (ref 1.8–7.7)
NEUTROPHILS NFR BLD: 88.6 % (ref 38–73)
PHOSPHATE SERPL-MCNC: 7.2 MG/DL (ref 2.7–4.5)
PISA TR MAX VEL: 3.57 M/S
PLATELET # BLD AUTO: 310 K/UL (ref 150–350)
PMV BLD AUTO: 10.4 FL (ref 9.2–12.9)
POCT GLUCOSE: 241 MG/DL (ref 70–110)
POCT GLUCOSE: 262 MG/DL (ref 70–110)
POCT GLUCOSE: 351 MG/DL (ref 70–110)
POCT GLUCOSE: 362 MG/DL (ref 70–110)
POTASSIUM SERPL-SCNC: 5.3 MMOL/L (ref 3.5–5.1)
PROT SERPL-MCNC: 9.2 G/DL (ref 6–8.4)
PV PEAK VELOCITY: 1.09 CM/S
RA MAJOR: 4.41 CM
RA PRESSURE: 8 MMHG
RA WIDTH: 3.31 CM
RBC # BLD AUTO: 3.78 M/UL (ref 4–5.4)
RIGHT VENTRICULAR END-DIASTOLIC DIMENSION: 3.55 CM
RV TISSUE DOPPLER FREE WALL SYSTOLIC VELOCITY 1 (APICAL 4 CHAMBER VIEW): 10.55 M/S
SINUS: 2.86 CM
SODIUM SERPL-SCNC: 133 MMOL/L (ref 136–145)
STJ: 2.31 CM
TDI LATERAL: 0.04
TDI SEPTAL: 0.04
TDI: 0.04
TR MAX PG: 50.98 MMHG
TRICUSPID ANNULAR PLANE SYSTOLIC EXCURSION: 1.43 CM
TROPONIN I SERPL DL<=0.01 NG/ML-MCNC: 0.8 NG/ML (ref 0–0.03)
TROPONIN I SERPL DL<=0.01 NG/ML-MCNC: 1.18 NG/ML (ref 0–0.03)
TV REST PULMONARY ARTERY PRESSURE: 59 MMHG
WBC # BLD AUTO: 13.61 K/UL (ref 3.9–12.7)

## 2019-06-10 PROCEDURE — 63600175 PHARM REV CODE 636 W HCPCS: Mod: NTX | Performed by: INTERNAL MEDICINE

## 2019-06-10 PROCEDURE — 83036 HEMOGLOBIN GLYCOSYLATED A1C: CPT | Mod: NTX

## 2019-06-10 PROCEDURE — 25000003 PHARM REV CODE 250: Mod: NTX | Performed by: INTERNAL MEDICINE

## 2019-06-10 PROCEDURE — 93010 EKG 12-LEAD: ICD-10-PCS | Mod: 76,NTX,, | Performed by: INTERNAL MEDICINE

## 2019-06-10 PROCEDURE — 93458 L HRT ARTERY/VENTRICLE ANGIO: CPT | Mod: 59,NTX | Performed by: INTERNAL MEDICINE

## 2019-06-10 PROCEDURE — 63600175 PHARM REV CODE 636 W HCPCS: Mod: NTX | Performed by: EMERGENCY MEDICINE

## 2019-06-10 PROCEDURE — C9600 PERC DRUG-EL COR STENT SING: HCPCS | Mod: RC,NTX | Performed by: INTERNAL MEDICINE

## 2019-06-10 PROCEDURE — C1769 GUIDE WIRE: HCPCS | Mod: NTX | Performed by: INTERNAL MEDICINE

## 2019-06-10 PROCEDURE — 99152 MOD SED SAME PHYS/QHP 5/>YRS: CPT | Mod: NTX,,, | Performed by: INTERNAL MEDICINE

## 2019-06-10 PROCEDURE — 85730 THROMBOPLASTIN TIME PARTIAL: CPT | Mod: NTX

## 2019-06-10 PROCEDURE — 25000003 PHARM REV CODE 250: Mod: NTX

## 2019-06-10 PROCEDURE — 99152 PR MOD CONSCIOUS SEDATION, SAME PHYS, 5+ YRS, FIRST 15 MIN: ICD-10-PCS | Mod: NTX,,, | Performed by: INTERNAL MEDICINE

## 2019-06-10 PROCEDURE — 25500020 PHARM REV CODE 255: Mod: NTX | Performed by: INTERNAL MEDICINE

## 2019-06-10 PROCEDURE — 93458 PR CATH PLACE/CORON ANGIO, IMG SUPER/INTERP,W LEFT HEART VENTRICULOGRAPHY: ICD-10-PCS | Mod: 26,59,NTX, | Performed by: INTERNAL MEDICINE

## 2019-06-10 PROCEDURE — 25000003 PHARM REV CODE 250: Mod: NTX | Performed by: EMERGENCY MEDICINE

## 2019-06-10 PROCEDURE — 93571 PR HEART FLOW RESERV MEASURE,INIT VESSL: ICD-10-PCS | Mod: 26,52,RC,NTX | Performed by: INTERNAL MEDICINE

## 2019-06-10 PROCEDURE — 92929 PR STENT, ADD'L VESSEL: CPT | Mod: LC,NTX,, | Performed by: INTERNAL MEDICINE

## 2019-06-10 PROCEDURE — 21400001 HC TELEMETRY ROOM: Mod: NTX

## 2019-06-10 PROCEDURE — 93010 ELECTROCARDIOGRAM REPORT: CPT | Mod: 76,NTX,, | Performed by: INTERNAL MEDICINE

## 2019-06-10 PROCEDURE — 25000003 PHARM REV CODE 250: Mod: NTX | Performed by: HOSPITALIST

## 2019-06-10 PROCEDURE — C1887 CATHETER, GUIDING: HCPCS | Mod: NTX | Performed by: INTERNAL MEDICINE

## 2019-06-10 PROCEDURE — 80100016 HC MAINTENANCE HEMODIALYSIS: Mod: NTX

## 2019-06-10 PROCEDURE — C1725 CATH, TRANSLUMIN NON-LASER: HCPCS | Mod: NTX | Performed by: INTERNAL MEDICINE

## 2019-06-10 PROCEDURE — C1874 STENT, COATED/COV W/DEL SYS: HCPCS | Mod: NTX | Performed by: INTERNAL MEDICINE

## 2019-06-10 PROCEDURE — 93571 IV DOP VEL&/PRESS C FLO 1ST: CPT | Mod: 52,RC,NTX | Performed by: INTERNAL MEDICINE

## 2019-06-10 PROCEDURE — C9601 PERC DRUG-EL COR STENT BRAN: HCPCS | Mod: LC,NTX | Performed by: INTERNAL MEDICINE

## 2019-06-10 PROCEDURE — 99152 MOD SED SAME PHYS/QHP 5/>YRS: CPT | Mod: NTX | Performed by: INTERNAL MEDICINE

## 2019-06-10 PROCEDURE — 92928 PR STENT: ICD-10-PCS | Mod: RC,NTX,, | Performed by: INTERNAL MEDICINE

## 2019-06-10 PROCEDURE — 92928 PRQ TCAT PLMT NTRAC ST 1 LES: CPT | Mod: RC,NTX,, | Performed by: INTERNAL MEDICINE

## 2019-06-10 PROCEDURE — C1894 INTRO/SHEATH, NON-LASER: HCPCS | Mod: NTX | Performed by: INTERNAL MEDICINE

## 2019-06-10 PROCEDURE — 84484 ASSAY OF TROPONIN QUANT: CPT | Mod: NTX

## 2019-06-10 PROCEDURE — 92929 PR STENT, ADD'L VESSEL: ICD-10-PCS | Mod: LC,NTX,, | Performed by: INTERNAL MEDICINE

## 2019-06-10 PROCEDURE — 93572 PR HEART FLOW RESERV MEASURE,ADDN VESSL: ICD-10-PCS | Mod: 26,52,LC,NTX | Performed by: INTERNAL MEDICINE

## 2019-06-10 PROCEDURE — 63600175 PHARM REV CODE 636 W HCPCS: Mod: NTX | Performed by: HOSPITALIST

## 2019-06-10 PROCEDURE — 93005 ELECTROCARDIOGRAM TRACING: CPT | Mod: NTX

## 2019-06-10 PROCEDURE — 80053 COMPREHEN METABOLIC PANEL: CPT | Mod: NTX

## 2019-06-10 PROCEDURE — 93572 IV DOP VEL&/PRESS C FLO EA: CPT | Mod: 52,LC,NTX | Performed by: INTERNAL MEDICINE

## 2019-06-10 PROCEDURE — 83735 ASSAY OF MAGNESIUM: CPT | Mod: NTX

## 2019-06-10 PROCEDURE — 85025 COMPLETE CBC W/AUTO DIFF WBC: CPT | Mod: NTX

## 2019-06-10 PROCEDURE — 84100 ASSAY OF PHOSPHORUS: CPT | Mod: NTX

## 2019-06-10 PROCEDURE — 93572 IV DOP VEL&/PRESS C FLO EA: CPT | Mod: 26,52,LC,NTX | Performed by: INTERNAL MEDICINE

## 2019-06-10 PROCEDURE — 93010 ELECTROCARDIOGRAM REPORT: CPT | Mod: NTX,,, | Performed by: INTERNAL MEDICINE

## 2019-06-10 PROCEDURE — 93458 L HRT ARTERY/VENTRICLE ANGIO: CPT | Mod: 26,59,NTX, | Performed by: INTERNAL MEDICINE

## 2019-06-10 PROCEDURE — 36415 COLL VENOUS BLD VENIPUNCTURE: CPT | Mod: NTX

## 2019-06-10 PROCEDURE — 99153 MOD SED SAME PHYS/QHP EA: CPT | Mod: NTX | Performed by: INTERNAL MEDICINE

## 2019-06-10 PROCEDURE — 93571 IV DOP VEL&/PRESS C FLO 1ST: CPT | Mod: 26,52,RC,NTX | Performed by: INTERNAL MEDICINE

## 2019-06-10 DEVICE — STENT RONYX22530UX RESOLUTE ONYX 2.25X30
Type: IMPLANTABLE DEVICE | Site: CORONARY | Status: FUNCTIONAL
Brand: RESOLUTE ONYX™

## 2019-06-10 DEVICE — STENT RONYX30008UX RESOLUTE ONYX 3.00X08
Type: IMPLANTABLE DEVICE | Site: CORONARY | Status: FUNCTIONAL
Brand: RESOLUTE ONYX™

## 2019-06-10 DEVICE — STENT RONYX27518UX RESOLUTE ONYX 2.75X18
Type: IMPLANTABLE DEVICE | Site: CORONARY | Status: FUNCTIONAL
Brand: RESOLUTE ONYX™

## 2019-06-10 RX ORDER — METOPROLOL TARTRATE 1 MG/ML
INJECTION, SOLUTION INTRAVENOUS
Status: COMPLETED
Start: 2019-06-10 | End: 2019-06-10

## 2019-06-10 RX ORDER — ONDANSETRON 2 MG/ML
4 INJECTION INTRAMUSCULAR; INTRAVENOUS EVERY 12 HOURS PRN
Status: DISCONTINUED | OUTPATIENT
Start: 2019-06-10 | End: 2019-06-12 | Stop reason: HOSPADM

## 2019-06-10 RX ORDER — HYDRALAZINE HYDROCHLORIDE 20 MG/ML
10 INJECTION INTRAMUSCULAR; INTRAVENOUS EVERY 6 HOURS PRN
Status: DISCONTINUED | OUTPATIENT
Start: 2019-06-10 | End: 2019-06-10

## 2019-06-10 RX ORDER — HYDRALAZINE HYDROCHLORIDE 20 MG/ML
INJECTION INTRAMUSCULAR; INTRAVENOUS
Status: DISCONTINUED | OUTPATIENT
Start: 2019-06-10 | End: 2019-06-10 | Stop reason: HOSPADM

## 2019-06-10 RX ORDER — METOPROLOL TARTRATE 1 MG/ML
10 INJECTION, SOLUTION INTRAVENOUS EVERY 5 MIN PRN
Status: DISCONTINUED | OUTPATIENT
Start: 2019-06-10 | End: 2019-06-10

## 2019-06-10 RX ORDER — CLOPIDOGREL 300 MG/1
TABLET, FILM COATED ORAL
Status: DISCONTINUED | OUTPATIENT
Start: 2019-06-10 | End: 2019-06-10 | Stop reason: HOSPADM

## 2019-06-10 RX ORDER — VERAPAMIL HYDROCHLORIDE 2.5 MG/ML
INJECTION, SOLUTION INTRAVENOUS
Status: DISCONTINUED | OUTPATIENT
Start: 2019-06-10 | End: 2019-06-10 | Stop reason: HOSPADM

## 2019-06-10 RX ORDER — LABETALOL HYDROCHLORIDE 5 MG/ML
10 INJECTION, SOLUTION INTRAVENOUS EVERY 6 HOURS PRN
Status: DISCONTINUED | OUTPATIENT
Start: 2019-06-10 | End: 2019-06-12 | Stop reason: HOSPADM

## 2019-06-10 RX ORDER — HYDROMORPHONE HYDROCHLORIDE 2 MG/ML
0.5 INJECTION, SOLUTION INTRAMUSCULAR; INTRAVENOUS; SUBCUTANEOUS ONCE
Status: COMPLETED | OUTPATIENT
Start: 2019-06-10 | End: 2019-06-10

## 2019-06-10 RX ORDER — SEVELAMER CARBONATE 800 MG/1
1600 TABLET, FILM COATED ORAL
Status: DISCONTINUED | OUTPATIENT
Start: 2019-06-10 | End: 2019-06-12 | Stop reason: HOSPADM

## 2019-06-10 RX ORDER — HYDROMORPHONE HYDROCHLORIDE 2 MG/ML
1 INJECTION, SOLUTION INTRAMUSCULAR; INTRAVENOUS; SUBCUTANEOUS EVERY 6 HOURS PRN
Status: DISCONTINUED | OUTPATIENT
Start: 2019-06-10 | End: 2019-06-12 | Stop reason: HOSPADM

## 2019-06-10 RX ORDER — PROCHLORPERAZINE EDISYLATE 5 MG/ML
10 INJECTION INTRAMUSCULAR; INTRAVENOUS EVERY 6 HOURS PRN
Status: DISCONTINUED | OUTPATIENT
Start: 2019-06-10 | End: 2019-06-12 | Stop reason: HOSPADM

## 2019-06-10 RX ORDER — MIDAZOLAM HYDROCHLORIDE 1 MG/ML
INJECTION, SOLUTION INTRAMUSCULAR; INTRAVENOUS
Status: DISCONTINUED | OUTPATIENT
Start: 2019-06-10 | End: 2019-06-10 | Stop reason: HOSPADM

## 2019-06-10 RX ORDER — LIDOCAINE HYDROCHLORIDE 10 MG/ML
INJECTION, SOLUTION EPIDURAL; INFILTRATION; INTRACAUDAL; PERINEURAL
Status: DISCONTINUED | OUTPATIENT
Start: 2019-06-10 | End: 2019-06-10 | Stop reason: HOSPADM

## 2019-06-10 RX ORDER — NITROGLYCERIN 20 MG/100ML
INJECTION INTRAVENOUS
Status: DISCONTINUED | OUTPATIENT
Start: 2019-06-10 | End: 2019-06-12 | Stop reason: HOSPADM

## 2019-06-10 RX ORDER — HEPARIN SODIUM 1000 [USP'U]/ML
INJECTION, SOLUTION INTRAVENOUS; SUBCUTANEOUS
Status: DISCONTINUED | OUTPATIENT
Start: 2019-06-10 | End: 2019-06-10 | Stop reason: HOSPADM

## 2019-06-10 RX ORDER — FENTANYL CITRATE 50 UG/ML
INJECTION, SOLUTION INTRAMUSCULAR; INTRAVENOUS
Status: DISCONTINUED | OUTPATIENT
Start: 2019-06-10 | End: 2019-06-10 | Stop reason: HOSPADM

## 2019-06-10 RX ORDER — MORPHINE SULFATE 10 MG/ML
3 INJECTION INTRAMUSCULAR; INTRAVENOUS; SUBCUTANEOUS
Status: DISCONTINUED | OUTPATIENT
Start: 2019-06-10 | End: 2019-06-12 | Stop reason: HOSPADM

## 2019-06-10 RX ORDER — ONDANSETRON 2 MG/ML
8 INJECTION INTRAMUSCULAR; INTRAVENOUS EVERY 8 HOURS PRN
Status: DISCONTINUED | OUTPATIENT
Start: 2019-06-10 | End: 2019-06-12 | Stop reason: HOSPADM

## 2019-06-10 RX ORDER — ONDANSETRON 2 MG/ML
4 INJECTION INTRAMUSCULAR; INTRAVENOUS
Status: COMPLETED | OUTPATIENT
Start: 2019-06-10 | End: 2019-06-10

## 2019-06-10 RX ORDER — PHENYLEPHRINE HCL IN 0.9% NACL 1 MG/10 ML
SYRINGE (ML) INTRAVENOUS
Status: DISCONTINUED | OUTPATIENT
Start: 2019-06-10 | End: 2019-06-10 | Stop reason: HOSPADM

## 2019-06-10 RX ORDER — METOPROLOL TARTRATE 1 MG/ML
INJECTION, SOLUTION INTRAVENOUS CODE/TRAUMA/SEDATION MEDICATION
Status: COMPLETED | OUTPATIENT
Start: 2019-06-10 | End: 2019-06-10

## 2019-06-10 RX ADMIN — INSULIN DETEMIR 5 UNITS: 100 INJECTION, SOLUTION SUBCUTANEOUS at 12:06

## 2019-06-10 RX ADMIN — HYDROMORPHONE HYDROCHLORIDE 0.5 MG: 2 INJECTION, SOLUTION INTRAMUSCULAR; INTRAVENOUS; SUBCUTANEOUS at 02:06

## 2019-06-10 RX ADMIN — ONDANSETRON 8 MG: 2 INJECTION INTRAMUSCULAR; INTRAVENOUS at 12:06

## 2019-06-10 RX ADMIN — AMLODIPINE BESYLATE 10 MG: 5 TABLET ORAL at 12:06

## 2019-06-10 RX ADMIN — METOCLOPRAMIDE HYDROCHLORIDE 5 MG: 5 SOLUTION ORAL at 12:06

## 2019-06-10 RX ADMIN — CLONIDINE HYDROCHLORIDE 0.2 MG: 0.1 TABLET ORAL at 12:06

## 2019-06-10 RX ADMIN — PROMETHAZINE HYDROCHLORIDE 6.25 MG: 25 INJECTION INTRAMUSCULAR; INTRAVENOUS at 08:06

## 2019-06-10 RX ADMIN — CLOPIDOGREL BISULFATE 75 MG: 75 TABLET ORAL at 12:06

## 2019-06-10 RX ADMIN — NEPHROCAP 1 CAPSULE: 1 CAP ORAL at 12:06

## 2019-06-10 RX ADMIN — METOCLOPRAMIDE HYDROCHLORIDE 5 MG: 5 SOLUTION ORAL at 09:06

## 2019-06-10 RX ADMIN — LABETALOL HYDROCHLORIDE 10 MG: 5 INJECTION INTRAVENOUS at 02:06

## 2019-06-10 RX ADMIN — METOPROLOL TARTRATE 10 MG: 1 INJECTION, SOLUTION INTRAVENOUS at 04:06

## 2019-06-10 RX ADMIN — INSULIN ASPART 1 UNITS: 100 INJECTION, SOLUTION INTRAVENOUS; SUBCUTANEOUS at 09:06

## 2019-06-10 RX ADMIN — HYDRALAZINE HYDROCHLORIDE 10 MG: 20 INJECTION INTRAMUSCULAR; INTRAVENOUS at 08:06

## 2019-06-10 RX ADMIN — INSULIN ASPART 5 UNITS: 100 INJECTION, SOLUTION INTRAVENOUS; SUBCUTANEOUS at 12:06

## 2019-06-10 RX ADMIN — MUPIROCIN: 20 OINTMENT TOPICAL at 12:06

## 2019-06-10 RX ADMIN — ASPIRIN 325 MG ORAL TABLET 325 MG: 325 PILL ORAL at 12:06

## 2019-06-10 RX ADMIN — MUPIROCIN: 20 OINTMENT TOPICAL at 09:06

## 2019-06-10 RX ADMIN — HYDROCODONE BITARTRATE AND ACETAMINOPHEN 1 TABLET: 5; 325 TABLET ORAL at 02:06

## 2019-06-10 RX ADMIN — ONDANSETRON 4 MG: 2 INJECTION INTRAMUSCULAR; INTRAVENOUS at 12:06

## 2019-06-10 RX ADMIN — CARVEDILOL 25 MG: 6.25 TABLET, FILM COATED ORAL at 12:06

## 2019-06-10 RX ADMIN — CALCITRIOL 0.5 MCG: 0.25 CAPSULE ORAL at 12:06

## 2019-06-10 RX ADMIN — INSULIN DETEMIR 5 UNITS: 100 INJECTION, SOLUTION SUBCUTANEOUS at 09:06

## 2019-06-10 RX ADMIN — ONDANSETRON 4 MG: 2 INJECTION INTRAMUSCULAR; INTRAVENOUS at 02:06

## 2019-06-10 RX ADMIN — HYDROMORPHONE HYDROCHLORIDE 1 MG: 2 INJECTION, SOLUTION INTRAMUSCULAR; INTRAVENOUS; SUBCUTANEOUS at 09:06

## 2019-06-10 RX ADMIN — ROSUVASTATIN CALCIUM 5 MG: 5 TABLET, FILM COATED ORAL at 12:06

## 2019-06-10 RX ADMIN — HYDRALAZINE HYDROCHLORIDE 10 MG: 20 INJECTION INTRAMUSCULAR; INTRAVENOUS at 02:06

## 2019-06-10 RX ADMIN — METOPROLOL TARTRATE 10 MG: 5 INJECTION, SOLUTION INTRAVENOUS at 02:06

## 2019-06-10 RX ADMIN — PANTOPRAZOLE SODIUM 40 MG: 40 TABLET, DELAYED RELEASE ORAL at 12:06

## 2019-06-10 RX ADMIN — HEPARIN SODIUM 12 UNITS/KG/HR: 10000 INJECTION, SOLUTION INTRAVENOUS at 10:06

## 2019-06-10 RX ADMIN — HYDRALAZINE HYDROCHLORIDE 100 MG: 25 TABLET ORAL at 12:06

## 2019-06-10 NOTE — NURSING
Bedside shift report received from AI Slater. Communication board has been updated. NAD noted. Will continue to monitor.

## 2019-06-10 NOTE — BRIEF OP NOTE
[facility]  Brief Operative Note     SUMMARY     Surgery Date: 6/10/2019     Surgeon(s) and Role:     * Qasim Guzman MD - Primary    Assisting Surgeon: None    Pre-op Diagnosis:  NSTEMI (non-ST elevated myocardial infarction) [I21.4]    Post-op Diagnosis:  Post-Op Diagnosis Codes:     * NSTEMI (non-ST elevated myocardial infarction) [I21.4]    Procedure(s) (LRB):  Left heart cath, 330 pm start (Left)  Fractional Flow [place] (FFR), Coronary  Angioplasty-coronary    Anesthesia: RN IV Sedation    Description of the findings of the procedure:   1. PCI of mid to distal RCA with drug-eluting stent x1  2. PCI of OM1 with drug-eluting stent x1  3. PCI of mid circ with drug-eluting stent x1    Findings/Key Components:  LVEDP: 12 mmHg    LM:  No significant stenosis  LAD:  Proximal 20% stenosis  LCx:  Mid circ has a severe 70-80 % eccentric stenosis right after the takeoff of 1st OM.  IFR was performed across it and was found to be 0.59.  Successful PCI with drug-eluting stent x 1.  Post dilated at high atmospheric pressures with excellent angiographic results and BRITTANY 3 flow post PCI  OM1 has a 70% stenosis IFR was performed across it and was found to be 0.83.  PCI was performed with drug-eluting stent x1.  Post dilated at high atmospheric pressures with excellent angiographic results and BRITTANY 3 flow post PCI  RCA:  Previously placed RCA stents are patent.  Distal to the RCA stent there is a long 70% stenosis.  IFR was performed across it and it was found to be 0.86.  PCI of RCA performed with drug-eluting stent x1.  Post dilated at high atmospheric pressures with excellent angiographic results and BRITTANY 3 flow post PCI    Hemostasis:  R Radial band      Impression:  Hemodynamically significant stenosis of RCA, mid circ and OM1.  PCI of RCA, mid circ and OM1 with drug-eluting stents    Plan:  Aspirin 81 mg daily indefinitely    Plavix 75 mg daily for at least 1 year uninterrupted.  Longer if no  contraindications.    Aggressive risk factor modification    Follow-up in Cardiology Clinic in 1 week    Estimated Blood Loss:  Less than 20 cc       Specimens: None

## 2019-06-10 NOTE — PLAN OF CARE
Problem: Adult Inpatient Plan of Care  Goal: Plan of Care Review  Outcome: Ongoing (interventions implemented as appropriate)     06/10/19 1757   Plan of Care Review   Plan of Care Reviewed With patient   Progress improving       Problem: Hypertension Acute  Goal: Blood Pressure Within Desired Range    Intervention: Normalize Blood Pressure     06/10/19 1757   Manage Acute Allergic Reaction   Medication Review/Management medications reviewed   Prevent or Manage Pain   Sensory Stimulation Regulation care clustered;quiet environment promoted;music/television provided for relaxation;lighting decreased

## 2019-06-10 NOTE — NURSING
Patient bedside report has been completed and given to Gricelda. Chart check has been completed. NAD noted. Patient is still off the floor in the cath lab.

## 2019-06-10 NOTE — ASSESSMENT & PLAN NOTE
Presenting with chest pain, abdominal pain, intractable N/V, found to have hypertensive crisis, elevated trop/bnp, cxr with vascular congestion. Concern for cardiac ischemia. Pulmonary edema likely from malignant hypertension and possible ischemia.  - HD as per nephro   - Cardiology consulted for cp  - Heparin gtt as per chest pain section below  - TTE pending  - Control BP  - Supplementary 02 as needed

## 2019-06-10 NOTE — ASSESSMENT & PLAN NOTE
Presenting with chest pain, abdominal pain, intractable N/V, found to have hypertensive crisis, elevated trop/bnp, cxr with vascular congestion. Concern for cardiac ischemia. Pulmonary edema likely from malignant hypertension and cardiac ischemia.  - HD as per nephro   - Cardiology planning [facility] today  - Heparin gtt as per chest pain section below  - TTE noting reduced EF with systolic and diastolic dysfunction , wall motion abnormalities  - Control BP, Prn meds in place   - Supplementary 02 as needed

## 2019-06-10 NOTE — NURSING
0850  - notified Dr. Batres of /98 manula. He states to call Dr. Guzman and notify him.     0859  - Dr. Guzman paged overhead    0900 - Dr. Quiroz was at the desk and asked about the situation. Dr. Quiroz says to push 10mg hydrazine in 20 minutes 0920 if BP >180/90.    0902 - Dr. Guzman answered page and states that he talked to Dr. Quiroz and that Dr. Quiroz was taking over care. Dr. Guzman asked about situation and if patient could lie flat. I stated no r/t n/v. Dr. Guzman states that he will probably cancel heart cath if she cannot tolerate lying flat but would see the patient before making that decision.

## 2019-06-10 NOTE — ASSESSMENT & PLAN NOTE
Presenting with chest tightness. Hx of CAD with stents x 2 in 2017. EKG concerning for lateral ischemia. Cardiology consulted. Cath in March of 2019 showed patent RCA stents, nonobstructive disease in LAD and circumflex.  Trop 0.4 --> 1 --> 1.5 peaked  Start ACS protocol with heparin drip, will give 325 asa  Continue BB, Lipitor, plavix, daily ASA  TTE as above  Planned for [facility] today, HD post LHC  Nitro as needed (not during dialysis if able)

## 2019-06-10 NOTE — H&P
[facility] Medicine  History & Physical    Patient Name: Gilda Schroeder  MRN: [id number]  Admission Date: 6/9/2019  Attending Physician: Elroy Batres MD   Primary Care Provider: Primary Doctor No    Patient information was obtained from patient and ER records.     Subjective:     Principal Problem:Acute respiratory failure with hypoxia    Chief Complaint:   Chief Complaint   Patient presents with    Shortness of Breath     sob and vomiting since last night.  dialysis pt, last dialysis Friday without complications.  denies diarrhea or fever.   denies hx of resp dx.         HPI: 44 yof with hx of ESRD on HD, DM type 2, CAD s/p PCI (2017), HTN, HLD, hx of chronic abdominal pain and gastroparesis presenting with sudden onset abdominal pain with associated intractable nausea, emesis, non radiating chest tightness, and dyspnea since early this AM. Patient denies any fevers, chills, diaphoresis, syncope, visual disturbances, dizziness, palpitations, left arm pain, hemoptysis, diarrhea, or constipation. Last dialysis was Friday, and she get 4x HD per week. No recent travel or sick contacts.    In the ED patient was hypertensive sbp 250s, tachycardic, tachypenic hypoxic 76% requiring O2. Labs significant for BNP 2443, trop 0.39, Cr 12, K 6.2. EKG NRS with ST abnormalities concerning for lateral ischemia. CXR noting pulmonary vascular congestion. Nephrology and cardiology consulted.  paged for admission.         Past Medical History:   Diagnosis Date    Allergy     morphine    Anemia     Anticoagulant long-term use     heparin    CAD (coronary artery disease)     stent x 2    Diabetes mellitus     Diabetes mellitus with ESRD (end-stage renal disease) 7/5/2016    Disorder of kidney and ureter     ESRD (end stage renal disease) on dialysis     Gastroesophageal reflux disease without esophagitis 1/10/2018    HTN (hypertension)     Obesity     Peritonitis associated with  peritoneal dialysis 2017       Past Surgical History:   Procedure Laterality Date    ANGIOGRAM-CORONARY N/A 2017    [facility]der [facility] CATH    AV FISTULA PLACEMENT       SECTION      EGD (ESOPHAGOGASTRODUODENOSCOPY) N/A 3/14/2019    Performed by Perry Washburn MD at [facility] ENDO    EXCISION-PILONIDAL CYST N/A 2016    [facility] OR    Fractional Flow [place] (FFR), Coronary  3/16/2019    Performed by [facility] Crowder [facility] at [facility] CATH    heart stent  2017    INSERTION-CATHETER-HEMODIALYSIS N/A 2017    Performed by Miles Bond MD at [facility] OR    Left heart cath Right 3/16/2019    Performed by [facility]der [facility] at [facility] CATH    PLACEMENT PERITONEAL DIALYSIS CATHETER      PORTACATH PLACEMENT      for dialysis; removed    REMOVAL-CATHETER-DIALYSIS-PERITONEAL N/A 2017    Performed by Miles Bond [facility] at [facility] OR    SKIN GRAFT  10/2017    TUBAL LIGATION         Review of patient's allergies indicates:   Allergen Reactions    Morphine Other (See Comments)     Heart rate slows       No current facility-administered medications on file prior to encounter.      Current Outpatient Medications on File Prior to Encounter   Medication Sig    amLODIPine (NORVASC) 10 MG tablet Take 10 mg by mouth once daily.    aspirin (ECOTRIN) 81 MG EC tablet Take 81 mg by mouth once daily.    B,C/FERROUS FUM/FA/D3/ZINC OX (PRORENAL ORAL) Take 1 tablet by mouth once daily.    calcitRIOL (ROCALTROL) 0.5 MCG Cap Take 0.5 mcg by mouth once daily.    carvedilol (COREG) 25 MG tablet TAKE ONE TABLET BY MOUTH TWICE DAILY    cloNIDine (CATAPRES) 0.2 MG tablet Take 0.2 mg by mouth 2 (two) times daily.    clopidogrel (PLAVIX) 75 mg tablet Take 75 mg by mouth once daily.    hydrALAZINE (APRESOLINE) 100 MG tablet Take 100 mg by mouth 3 (three) times daily.     hydrOXYzine pamoate (VISTARIL) 25 MG Cap Take 25 mg by  mouth 2 (two) times daily. Taking bid    insulin aspart U-100 (NOVOLOG) 100 unit/mL injection Per home dose Sliding Scale TID with meals.    insulin detemir U-100 (LEVEMIR) 100 unit/mL injection Inject 5 Units into the skin 2 (two) times daily.    metoclopramide HCl (REGLAN) 5 MG tablet Take 5 mg by mouth 4 (four) times daily.    pantoprazole (PROTONIX) 40 MG tablet Take 1 tablet (40 mg total) by mouth once daily.    promethazine (PHENERGAN) 25 MG tablet Take 1 tablet (25 mg total) by mouth every 6 (six) hours as needed for Nausea.    rosuvastatin (CRESTOR) 5 MG tablet Take 1 tablet (5 mg total) by mouth once daily.    sevelamer carbonate (RENVELA) 800 mg Tab Take 800 mg by mouth 3 (three) times daily with meals.    ondansetron (ZOFRAN) 4 MG tablet Take 1 tablet (4 mg total) by mouth 2 (two) times daily.     Family History     Problem Relation (Age of Onset)    Diabetes Mother    No Known Problems Father, Sister, Brother        Tobacco Use    Smoking status: Former Smoker     Packs/day: 0.50     Years: 12.00     Pack years: 6.00     Types: Cigarettes     Last attempt to quit:      Years since quittin.4   Substance and Sexual Activity    Alcohol use: No    Drug use: No    Sexual activity: Yes     Partners: Male     Review of Systems   Constitutional: Positive for fatigue. Negative for chills, diaphoresis and fever.   HENT: Negative for congestion, rhinorrhea, sinus pressure, sore throat and trouble swallowing.    Eyes: Negative for photophobia and visual disturbance.   Respiratory: Positive for chest tightness and shortness of breath. Negative for cough and wheezing.    Cardiovascular: Positive for chest pain. Negative for palpitations and leg swelling.   Gastrointestinal: Positive for abdominal pain, nausea and vomiting. Negative for abdominal distention, blood in stool, constipation and diarrhea.   Genitourinary: Negative for flank pain and pelvic pain.   Musculoskeletal: Negative for  arthralgias, back pain, joint swelling, myalgias and neck stiffness.   Skin: Negative for color change, pallor, rash and wound.   Neurological: Positive for weakness (Global weakness) and headaches. Negative for dizziness, seizures, syncope, speech difficulty, light-headedness and numbness.   Psychiatric/Behavioral: Negative for agitation, behavioral problems, confusion and hallucinations.     Objective:     Vital Signs (Most Recent):  Temp: 98.4 °F (36.9 °C) (06/09/19 1301)  Pulse: 103 (06/09/19 1331)  Resp: 20 (06/09/19 1331)  BP: (!) 169/77 (06/09/19 1331)  SpO2: 98 % (06/09/19 1331) Vital Signs (24h Range):  Temp:  [98 °F (36.7 °C)-98.6 °F (37 °C)] 98.4 °F (36.9 °C)  Pulse:  [] 103  Resp:  [16-34] 20  SpO2:  [76 %-100 %] 98 %  BP: (165-255)/() 169/77     Weight: 83.9 kg (185 lb)  Body mass index is 30.79 kg/m².    Physical Exam   Constitutional: She is oriented to person, place, and time. She appears well-developed and well-nourished. She appears distressed.   HENT:   Head: Normocephalic and atraumatic.   Mouth/Throat: No oropharyngeal exudate.   Eyes: Pupils are equal, round, and reactive to light. Conjunctivae and EOM are normal. No scleral icterus.   Neck: Normal range of motion. Neck supple. No JVD present.   Cardiovascular: Regular rhythm. Exam reveals no gallop and no friction rub.   No murmur heard.  Tachycardiac    Pulmonary/Chest: Effort normal and breath sounds normal. No stridor. No respiratory distress. She has no wheezes. She has no rales.   Abdominal: Soft. Bowel sounds are normal. She exhibits no distension. There is tenderness. There is no rebound and no guarding.   Musculoskeletal: Normal range of motion. She exhibits no edema, tenderness or deformity.   Neurological: She is alert and oriented to person, place, and time. No sensory deficit.   Skin: Skin is warm and dry. Capillary refill takes less than 2 seconds. No rash noted. She is not diaphoretic. No erythema. No pallor.    Psychiatric: She has a normal mood and affect. Her behavior is normal. Judgment and thought content normal.   Nursing note and vitals reviewed.        CRANIAL NERVES     CN III, IV, VI   Pupils are equal, round, and reactive to light.  Extraocular motions are normal.        Significant Labs: All pertinent labs within the past 24 hours have been reviewed.    Significant Imaging: I have reviewed and interpreted all pertinent imaging results/findings within the past 24 hours.    Assessment/Plan:     * Acute respiratory failure with hypoxia  Presenting with chest pain, abdominal pain, intractable N/V, found to have hypertensive crisis, elevated trop/bnp, cxr with vascular congestion. Concern for cardiac ischemia. Pulmonary edema likely from malignant hypertension and possible ischemia.  - HD as per nephro   - Cardiology consulted for cp  - Heparin gtt as per chest pain section below  - TTE pending  - Control BP  - Supplementary 02 as needed      NSTEMI (non-ST elevated myocardial infarction)  Presenting with chest tightness. Hx of CAD with stents x 2 in 2017. EKG concerning for lateral ischemia. Cardiology consulted. Cath in March of 2019 showed patent RCA stents, nonobstructive disease in LAD and circumflex.  Trop 0.4 --> 1 --> (8 pm pending)  Start ACS protocol with heparin drip, will give 325 asa  Continue BB, Lipitor, plavix, daily ASA  TTE pending  Serial troponin  Nitro as needed (not during dialysis if able)      Hypertensive crisis  Missed her meds today, resume home meds  HD as per nephro  Contributing to chest pain and possible ischemia  Control BP  PRN ordered    Chest pain  As above    Hyperkalemia  HD as per nephro      CAD (coronary artery disease)  As above    ESRD (end stage renal disease)  HD as per nephro      Type 2 diabetes mellitus, with long-term current use of insulin  Resume home insuline  SSI and accuchecks      VTE Risk Mitigation (From admission, onward)        Ordered     heparin 25,000  units in dextrose 5% (100 units/ml) IV bolus from bag - ADDITIONAL PRN BOLUS - 60 units/kg (max bolus 4000 units)  As needed (PRN)      06/09/19 1906     heparin 25,000 units in dextrose 5% (100 units/ml) IV bolus from bag - ADDITIONAL PRN BOLUS - 30 units/kg (max bolus 4000 units)  As needed (PRN)      06/09/19 1906     heparin 25,000 units in dextrose 5% 250 mL (100 units/mL) infusion LOW INTENSITY nomogram - [facility]  Continuous      06/09/19 1906     heparin 25,000 units in dextrose 5% (100 units/ml) IV bolus from bag INITIAL BOLUS (max bolus 4000 units)  Once      06/09/19 1906     IP VTE HIGH RISK PATIENT  Once      06/09/19 1455     Place sequential compression device  Until discontinued      06/09/19 1455             Elroy Batres MD  Department of Hospital Medicine   [facility]

## 2019-06-10 NOTE — SUBJECTIVE & OBJECTIVE
Interval hx: S/p HD and started on Hep gtt yesterday evening. Patient continues to have intractable N/V, abdominal pain exacerbating her hypertension this AM, appears in moderate distress. Antiemetics analgesics and antihypertensives ordered. Planned for [facility] today.      Review of Systems   Constitutional: Positive for fatigue. Negative for chills, diaphoresis and fever.   HENT: Negative for congestion, rhinorrhea, sinus pressure, sore throat and trouble swallowing.    Eyes: Negative for photophobia and visual disturbance.   Respiratory: Positive for chest tightness and shortness of breath. Negative for cough and wheezing.    Cardiovascular: Positive for chest pain. Negative for palpitations and leg swelling.   Gastrointestinal: Positive for abdominal pain, nausea and vomiting. Negative for abdominal distention, blood in stool, constipation and diarrhea.   Genitourinary: Negative for flank pain and pelvic pain.   Musculoskeletal: Negative for arthralgias, back pain, joint swelling, myalgias and neck stiffness.   Skin: Negative for color change, pallor, rash and wound.   Neurological: Positive for weakness (Global weakness) and headaches. Negative for dizziness, seizures, syncope, speech difficulty, light-headedness and numbness.   Psychiatric/Behavioral: Negative for agitation, behavioral problems, confusion and hallucinations.     Objective:     Vital Signs (Most Recent):  Temp: 98.1 °F (36.7 °C) (06/10/19 1200)  Pulse: 98 (06/10/19 1426)  Resp: 16 (06/10/19 1200)  BP: (!) 174/78 (06/10/19 1426)  SpO2: (!) 92 % (06/10/19 1200) Vital Signs (24h Range):  Temp:  [97.8 °F (36.6 °C)-98.6 °F (37 °C)] 98.1 °F (36.7 °C)  Pulse:  [] 98  Resp:  [16-24] 16  SpO2:  [92 %-100 %] 92 %  BP: ()/() 174/78     Weight: 83.9 kg (185 lb)  Body mass index is 34.96 kg/m².    Physical Exam   Constitutional: She is oriented to person, place, and time. She appears well-developed and well-nourished. She appears distressed.    HENT:   Head: Normocephalic and atraumatic.   Mouth/Throat: No oropharyngeal exudate.   Eyes: Pupils are equal, round, and reactive to light. Conjunctivae and EOM are normal. No scleral icterus.   Neck: Normal range of motion. Neck supple. No JVD present.   Cardiovascular: Regular rhythm. Exam reveals no gallop and no friction rub.   No murmur heard.  Tachycardiac    Pulmonary/Chest: Effort normal and breath sounds normal. No stridor. No respiratory distress. She has no wheezes. She has no rales.   Abdominal: Soft. Bowel sounds are normal. She exhibits no distension. There is tenderness. There is no rebound and no guarding.   Musculoskeletal: Normal range of motion. She exhibits no edema, tenderness or deformity.   Neurological: She is alert and oriented to person, place, and time. No sensory deficit.   Skin: Skin is warm and dry. Capillary refill takes less than 2 seconds. No rash noted. She is not diaphoretic. No erythema. No pallor.   Psychiatric: She has a normal mood and affect. Her behavior is normal. Judgment and thought content normal.   Nursing note and vitals reviewed.        CRANIAL NERVES     CN III, IV, VI   Pupils are equal, round, and reactive to light.  Extraocular motions are normal.        Significant Labs: All pertinent labs within the past 24 hours have been reviewed.    Significant Imaging: I have reviewed and interpreted all pertinent imaging results/findings within the past 24 hours.

## 2019-06-10 NOTE — ASSESSMENT & PLAN NOTE
Missed her meds today, resume home meds  HD as per nephro  Contributing to chest pain and possible ischemia  Control BP  PRN ordered

## 2019-06-10 NOTE — NURSING
Dr. Batres ordered Dilaudid 1mg q6hr severe pain PRN verbally. Ordered placed by me in front of Dr. Batres.

## 2019-06-10 NOTE — ASSESSMENT & PLAN NOTE
Missed her meds prior to admission  HD as per nephro  Pain and nausea contributing  Control BP  PRN ordered

## 2019-06-10 NOTE — ASSESSMENT & PLAN NOTE
Presenting with chest tightness. Hx of CAD with stents x 2 in 2017. EKG concerning for lateral ischemia. Cardiology consulted. Cath in March of 2019 showed patent RCA stents, nonobstructive disease in LAD and circumflex.  Trop 0.4 --> 1 --> (8 pm pending)  Start ACS protocol with heparin drip, will give 325 asa  Continue BB, Lipitor, plavix, daily ASA  TTE pending  Serial troponin  Nitro as needed (not during dialysis if able)

## 2019-06-10 NOTE — PROGRESS NOTES
Renal Progress Note    Date of Admission:  6/9/2019  7:23 AM    Length of Stay: 1  Days    Subjective: c.o. Abdominal discomfort    Objective:    Current Facility-Administered Medications   Medication    0.9%  NaCl infusion    0.9%  NaCl infusion    0.9%  NaCl infusion    0.9%  NaCl infusion    acetaminophen tablet 650 mg    amLODIPine tablet 10 mg    aspirin tablet 325 mg    calcitRIOL capsule 0.5 mcg    carvedilol tablet 25 mg    cloNIDine tablet 0.2 mg    clopidogrel tablet 75 mg    dextrose 50% injection 12.5 g    dextrose 50% injection 25 g    glucagon (human recombinant) injection 1 mg    glucose chewable tablet 16 g    glucose chewable tablet 24 g    heparin 25,000 units in dextrose 5% (100 units/ml) IV bolus from bag - ADDITIONAL PRN BOLUS - 30 units/kg (max bolus 4000 units)    heparin 25,000 units in dextrose 5% (100 units/ml) IV bolus from bag - ADDITIONAL PRN BOLUS - 60 units/kg (max bolus 4000 units)    heparin 25,000 units in dextrose 5% (100 units/ml) IV bolus from bag INITIAL BOLUS (max bolus 4000 units)    heparin 25,000 units in dextrose 5% 250 mL (100 units/mL) infusion LOW INTENSITY nomogram - [facility]    hydrALAZINE injection 10 mg    hydrALAZINE tablet 100 mg    HYDROcodone-acetaminophen 5-325 mg per tablet 1 tablet    hydromorphone (PF) injection 1 mg    insulin aspart U-100 pen 0-5 Units    insulin detemir U-100 pen 5 Units    labetalol injection 10 mg    metoclopramide HCl 5 mg/5 mL solution 5 mg    mupirocin 2 % ointment    ondansetron injection 8 mg    pantoprazole EC tablet 40 mg    polyethylene glycol packet 17 g    prochlorperazine injection Soln 10 mg    promethazine (PHENERGAN) 25 mg in dextrose 5 % 50 mL IVPB    rosuvastatin tablet 5 mg    senna-docusate 8.6-50 mg per tablet 1 tablet    sevelamer carbonate tablet 800 mg    sodium chloride 0.9% flush 10 mL    vitamin renal formula (B-complex-vitamin c-folic acid) 1 mg per  capsule 1 capsule       Vitals:    06/10/19 1211 06/10/19 1337 06/10/19 1408 06/10/19 1426   BP: (!) 192/92 (!) 172/82 (!) 180/90 (!) 174/78   BP Location: Right arm Right arm Right arm Right arm   Patient Position: Lying Lying Lying Lying   Pulse: (!) 111 (!) 112  98   Resp:       Temp:       TempSrc:       SpO2:       Weight:       Height:           I/O last 3 completed shifts:  In: 500 [Other:500]  Out: 2300 [Other:2300]  No intake/output data recorded.      Physical Exam:    NAD  Neck: supple  Heart: RRR   Lungs: Unlabored breathing  Abdomen: n/a  : n/a  Extremities:  n/a  Neurologic: Awake, alert, oriented x 3      Laboratories:    Recent Labs   Lab 06/10/19  0624   WBC 13.61*   RBC 3.78*   HGB 11.0*   HCT 34.2*      MCV 91   MCH 29.1   MCHC 32.2       Recent Labs   Lab 06/10/19  0624   CALCIUM 11.2*   PROT 9.2*   *   K 5.3*   CO2 24   CL 89*   BUN 33*   CREATININE 8.7*   ALKPHOS 106   ALT 24   AST 24   BILITOT 0.8       No results for input(s): COLORU, CLARITYU, SPECGRAV, PHUR, PROTEINUA, GLUCOSEU, BLOODU, WBCU, RBCU, BACTERIA, MUCUS in the last 24 hours.    Invalid input(s):  BILIRUBINCON    Microbiology Results (last 7 days)     Procedure Component Value Units Date/Time    Blood culture #1 [[id number]] Collected:  06/09/19 0855    Order Status:  Completed Specimen:  Blood from Peripheral, Upper Arm, Right Updated:  06/10/19 1103     Blood Culture, Routine No Growth to date     Blood Culture, Routine No Growth to date    Narrative:       Blood Culture #1    Blood culture #2 [[id number]] Collected:  06/09/19 0900    Order Status:  Completed Specimen:  Blood from Peripheral, Upper Arm, Right Updated:  06/10/19 1103     Blood Culture, Routine No Growth to date     Blood Culture, Routine No Growth to date    Narrative:       Blood Culture #2            Diagnostic Tests:   X-Ray Chest AP Portable [[id number]] Resulted: 06/09/19 0750   Order Status: Completed      Impression:       Cardiomegaly with  prominent central pulmonary vasculature and mildly accentuated interstitial markings suggestive of interstitial edema, similar to prior examination.      Electronically signed by: Rhett Barbosa MD  Date: 06/09/2019  Time: 07:50           Assessment:  45 y/o AAF with Hx. Of ESRD on home Hemodialysis admitted with:    - Hypertensive urgency  - s/p hypoxic respiratory failure due to pulmonary edema  - Abdominal pain with intractable nausea/emesis  - Chest pain with abnormal troponin r/o NSTEMI  - Uncontrolled HTN  - Hyperkalemia  - Hx. DM-2 with hyperglycemia at present  - Hx. 2nd. Hyper-parathyroidism  - Stable anemia of CKD  - Hyperphosphatemia  - Hypercalcemia        Plan:    - For Cardiac Cath. Today  - Dialysis after above, UF as tolerated, low Ca++ dialyzate, UF as tolerated.  - HOLD Calcitriol until serum Ca++ normalized  - Increase Sevelamer to 1.6g w/ each meal and f/u serum PO4-  - Glycemic control per admitting

## 2019-06-10 NOTE — PROGRESS NOTES
[facility] Hospital Medicine  Progress Note    Patient Name: Gilda Schroeder  MRN: [id number]  Patient Class: IP- Inpatient   Admission Date: 6/9/2019  Length of Stay: 1 days  Attending Physician: Elroy Batres MD  Primary Care Provider: Primary Doctor No      Subjective:     Principal Problem:Acute respiratory failure with hypoxia    HPI:  44 yof with hx of ESRD on HD, DM type 2, CAD s/p PCI (2017), HTN, HLD, hx of chronic abdominal pain and gastroparesis presenting with sudden onset abdominal pain with associated intractable nausea, emesis, non radiating chest tightness, and dyspnea since early this AM. Patient denies any fevers, chills, diaphoresis, syncope, visual disturbances, dizziness, palpitations, left arm pain, hemoptysis, diarrhea, or constipation. Last dialysis was Friday, and she get 4x HD per week. No recent travel or sick contacts.    In the ED patient was hypertensive sbp 250s, tachycardic, tachypenic hypoxic 76% requiring O2. Labs significant for BNP 2443, trop 0.39, Cr 12, K 6.2. EKG NRS with ST abnormalities concerning for lateral ischemia. CXR noting pulmonary vascular congestion. Nephrology and cardiology consulted.  paged for admission.    Hospital Course:  Admitted to hospital medicine for hypertensive crisis, acute pulmonary edema, NSTEMI, abdominal pain with N/V. Cardiology and nephrology consulted. S/p HD 6/9. Patient was started on heparin infusion ACS protocol as her tropoinin increased 0.3 --> 1 --> 1.5 (peaked). Cardiology planned LHC today.    Interval hx: S/p HD and started on Hep gtt yesterday evening. Patient continues to have intractable N/V, abdominal pain exacerbating her hypertension this AM, appears in moderate distress. Antiemetics analgesics and antihypertensives ordered. Planned for LHC today.      Review of Systems   Constitutional: Positive for fatigue. Negative for chills, diaphoresis and fever.   HENT: Negative for congestion, rhinorrhea,  sinus pressure, sore throat and trouble swallowing.    Eyes: Negative for photophobia and visual disturbance.   Respiratory: Positive for chest tightness and shortness of breath. Negative for cough and wheezing.    Cardiovascular: Positive for chest pain. Negative for palpitations and leg swelling.   Gastrointestinal: Positive for abdominal pain, nausea and vomiting. Negative for abdominal distention, blood in stool, constipation and diarrhea.   Genitourinary: Negative for flank pain and pelvic pain.   Musculoskeletal: Negative for arthralgias, back pain, joint swelling, myalgias and neck stiffness.   Skin: Negative for color change, pallor, rash and wound.   Neurological: Positive for weakness (Global weakness) and headaches. Negative for dizziness, seizures, syncope, speech difficulty, light-headedness and numbness.   Psychiatric/Behavioral: Negative for agitation, behavioral problems, confusion and hallucinations.     Objective:     Vital Signs (Most Recent):  Temp: 98.1 °F (36.7 °C) (06/10/19 1200)  Pulse: 98 (06/10/19 1426)  Resp: 16 (06/10/19 1200)  BP: (!) 174/78 (06/10/19 1426)  SpO2: (!) 92 % (06/10/19 1200) Vital Signs (24h Range):  Temp:  [97.8 °F (36.6 °C)-98.6 °F (37 °C)] 98.1 °F (36.7 °C)  Pulse:  [] 98  Resp:  [16-24] 16  SpO2:  [92 %-100 %] 92 %  BP: ()/() 174/78     Weight: 83.9 kg (185 lb)  Body mass index is 34.96 kg/m².    Physical Exam   Constitutional: She is oriented to person, place, and time. She appears well-developed and well-nourished. She appears distressed.   HENT:   Head: Normocephalic and atraumatic.   Mouth/Throat: No oropharyngeal exudate.   Eyes: Pupils are equal, round, and reactive to light. Conjunctivae and EOM are normal. No scleral icterus.   Neck: Normal range of motion. Neck supple. No JVD present.   Cardiovascular: Regular rhythm. Exam reveals no gallop and no friction rub.   No murmur heard.  Tachycardiac    Pulmonary/Chest: Effort normal and breath  sounds normal. No stridor. No respiratory distress. She has no wheezes. She has no rales.   Abdominal: Soft. Bowel sounds are normal. She exhibits no distension. There is tenderness. There is no rebound and no guarding.   Musculoskeletal: Normal range of motion. She exhibits no edema, tenderness or deformity.   Neurological: She is alert and oriented to person, place, and time. No sensory deficit.   Skin: Skin is warm and dry. Capillary refill takes less than 2 seconds. No rash noted. She is not diaphoretic. No erythema. No pallor.   Psychiatric: She has a normal mood and affect. Her behavior is normal. Judgment and thought content normal.   Nursing note and vitals reviewed.        CRANIAL NERVES     CN III, IV, VI   Pupils are equal, round, and reactive to light.  Extraocular motions are normal.        Significant Labs: All pertinent labs within the past 24 hours have been reviewed.    Significant Imaging: I have reviewed and interpreted all pertinent imaging results/findings within the past 24 hours.    Assessment/Plan:      * Acute respiratory failure with hypoxia  Presenting with chest pain, abdominal pain, intractable N/V, found to have hypertensive crisis, elevated trop/bnp, cxr with vascular congestion. Concern for cardiac ischemia. Pulmonary edema likely from malignant hypertension and cardiac ischemia.  - HD as per nephro   - Cardiology planning C today  - Heparin gtt as per chest pain section below  - TTE noting reduced EF with systolic and diastolic dysfunction , wall motion abnormalities  - Control BP, Prn meds in place   - Supplementary 02 as needed    NSTEMI (non-ST elevated myocardial infarction)  Presenting with chest tightness. Hx of CAD with stents x 2 in 2017. EKG concerning for lateral ischemia. Cardiology consulted. Cath in March of 2019 showed patent RCA stents, nonobstructive disease in LAD and circumflex.  Trop 0.4 --> 1 --> 1.5 peaked  Start ACS protocol with heparin drip, will give 325  asa  Continue BB, Lipitor, plavix, daily ASA  TTE as above  Planned for [facility] today, HD post C  Nitro as needed (not during dialysis if able)      Hypertensive crisis  Missed her meds prior to admission  HD as per nephro  Pain and nausea contributing  Control BP  PRN ordered    Chest pain  As above    Hyperkalemia  HD as per nephro      CAD (coronary artery disease)  As above    ESRD (end stage renal disease)  HD as per nephro      Type 2 diabetes mellitus, with long-term current use of insulin  Resume home insulin  SSI and accuchecks        VTE Risk Mitigation (From admission, onward)        Ordered     heparin 25,000 units in dextrose 5% (100 units/ml) IV bolus from bag - ADDITIONAL PRN BOLUS - 60 units/kg (max bolus 4000 units)  As needed (PRN)      06/09/19 1906     heparin 25,000 units in dextrose 5% (100 units/ml) IV bolus from bag - ADDITIONAL PRN BOLUS - 30 units/kg (max bolus 4000 units)  As needed (PRN)      06/09/19 1906     heparin 25,000 units in dextrose 5% 250 mL (100 units/mL) infusion LOW INTENSITY nomogram - [facility]  Continuous      06/09/19 1906     heparin 25,000 units in dextrose 5% (100 units/ml) IV bolus from bag INITIAL BOLUS (max bolus 4000 units)  Once      06/09/19 1906     IP VTE HIGH RISK PATIENT  Once      06/09/19 1455     Place sequential compression device  Until discontinued      06/09/19 1455              Elroy Batres MD  Department of Hospital Medicine   [facility]

## 2019-06-10 NOTE — NURSING
Dr. Quiroz paged and answered paged in the same minute. Dr. Quiroz notified of /92 manual and scheduled PO medications 0900 were held because patient was N/V. Patient is feeling better and can tolerate oral medications now. Called in regards to whether to give PO BP medications and IV or one over the other. Dr. Quiroz states to give PO medications and recheck BP in 1 hour. If BP still elevated call Dr. Quiroz again.

## 2019-06-10 NOTE — NURSING
Patient is leaving the floor to cath lab for scheduled heart cath via patient's bed. No oxygen or IV therapy. Last /92 manual. NAD noted.

## 2019-06-10 NOTE — PROGRESS NOTES
Received report from AI Brunson., patient in Dialysis & reports multiple visits to dialysis to administer r/t patient c/o N/V.     1945- Patient returned from Dialysis escorted by transport, arouses to voice & light touch.  Spouse at bedside.      0330- Patient c/o abdominal pain w/ n/v green liquid emesis. Zofran given 4mg- no relief, MD notified, new orders placed for Zofran 8 mg & Compazine.   Vital signs assessed -250/100-110 -140s RR 25-30.- Rapid Response called.  MD ordered labetalol x3. Given. BP stable.    Per possibility of cath lab procedure 6/10 per Dr. Guzman's note 6/9, the pre-cath procedure order to stop Heparin drip- charge nurse indicated stop time to be implemented prior to 6/10 AM/morning lab draw.     0530- Heparin drip stopped. Lab informed.

## 2019-06-10 NOTE — SUBJECTIVE & OBJECTIVE
Past Medical History:   Diagnosis Date    Allergy     morphine    Anemia     Anticoagulant long-term use     heparin    CAD (coronary artery disease)     stent x 2    Diabetes mellitus     Diabetes mellitus with ESRD (end-stage renal disease) 2016    Disorder of kidney and ureter     ESRD (end stage renal disease) on dialysis     Gastroesophageal reflux disease without esophagitis 1/10/2018    HTN (hypertension)     Obesity     Peritonitis associated with peritoneal dialysis 2017       Past Surgical History:   Procedure Laterality Date    ANGIOGRAM-CORONARY N/A 2017    Performed by [facility] Crowder [facility] at [facility] CATH    AV FISTULA PLACEMENT       SECTION      EGD (ESOPHAGOGASTRODUODENOSCOPY) N/A 3/14/2019    Performed by Perry Washburn MD at [facility] ENDO    EXCISION-PILONIDAL CYST N/A 2016    [facility] OR    Fractional Flow [place] (FFR), Coronary  3/16/2019    Performed by [facility]o Crowder MD at [facility] CATH    heart stent  2017    INSERTION-CATHETER-HEMODIALYSIS N/A 2017    Performed by Miles Bond MD at [facility] OR    Left heart cath Right 3/16/2019    Performed by [facility] Crowder [facility] at [facility] CATH    PLACEMENT PERITONEAL DIALYSIS CATHETER      PORTACATH PLACEMENT      for dialysis; removed    REMOVAL-CATHETER-DIALYSIS-PERITONEAL N/A 2017    Performed by Miles Bond [facility] at [facility] OR    SKIN GRAFT  10/2017    TUBAL LIGATION         Review of patient's allergies indicates:   Allergen Reactions    Morphine Other (See Comments)     Heart rate slows       No current facility-administered medications on file prior to encounter.      Current Outpatient Medications on File Prior to Encounter   Medication Sig    amLODIPine (NORVASC) 10 MG tablet Take 10 mg by mouth once daily.    aspirin (ECOTRIN) 81 MG EC tablet Take 81 mg by mouth once daily.    B,C/FERROUS FUM/FA/D3/ZINC OX (PRORENAL ORAL) Take  1 tablet by mouth once daily.    calcitRIOL (ROCALTROL) 0.5 MCG Cap Take 0.5 mcg by mouth once daily.    carvedilol (COREG) 25 MG tablet TAKE ONE TABLET BY MOUTH TWICE DAILY    cloNIDine (CATAPRES) 0.2 MG tablet Take 0.2 mg by mouth 2 (two) times daily.    clopidogrel (PLAVIX) 75 mg tablet Take 75 mg by mouth once daily.    hydrALAZINE (APRESOLINE) 100 MG tablet Take 100 mg by mouth 3 (three) times daily.     hydrOXYzine pamoate (VISTARIL) 25 MG Cap Take 25 mg by mouth 2 (two) times daily. Taking bid    insulin aspart U-100 (NOVOLOG) 100 unit/mL injection Per home dose Sliding Scale TID with meals.    insulin detemir U-100 (LEVEMIR) 100 unit/mL injection Inject 5 Units into the skin 2 (two) times daily.    metoclopramide HCl (REGLAN) 5 MG tablet Take 5 mg by mouth 4 (four) times daily.    pantoprazole (PROTONIX) 40 MG tablet Take 1 tablet (40 mg total) by mouth once daily.    promethazine (PHENERGAN) 25 MG tablet Take 1 tablet (25 mg total) by mouth every 6 (six) hours as needed for Nausea.    rosuvastatin (CRESTOR) 5 MG tablet Take 1 tablet (5 mg total) by mouth once daily.    sevelamer carbonate (RENVELA) 800 mg Tab Take 800 mg by mouth 3 (three) times daily with meals.    ondansetron (ZOFRAN) 4 MG tablet Take 1 tablet (4 mg total) by mouth 2 (two) times daily.     Family History     Problem Relation (Age of Onset)    Diabetes Mother    No Known Problems Father, Sister, Brother        Tobacco Use    Smoking status: Former Smoker     Packs/day: 0.50     Years: 12.00     Pack years: 6.00     Types: Cigarettes     Last attempt to quit:      Years since quittin.4   Substance and Sexual Activity    Alcohol use: No    Drug use: No    Sexual activity: Yes     Partners: Male     Review of Systems   Constitutional: Positive for fatigue. Negative for chills, diaphoresis and fever.   HENT: Negative for congestion, rhinorrhea, sinus pressure, sore throat and trouble swallowing.    Eyes: Negative for  photophobia and visual disturbance.   Respiratory: Positive for chest tightness and shortness of breath. Negative for cough and wheezing.    Cardiovascular: Positive for chest pain. Negative for palpitations and leg swelling.   Gastrointestinal: Positive for abdominal pain, nausea and vomiting. Negative for abdominal distention, blood in stool, constipation and diarrhea.   Genitourinary: Negative for flank pain and pelvic pain.   Musculoskeletal: Negative for arthralgias, back pain, joint swelling, myalgias and neck stiffness.   Skin: Negative for color change, pallor, rash and wound.   Neurological: Positive for weakness (Global weakness) and headaches. Negative for dizziness, seizures, syncope, speech difficulty, light-headedness and numbness.   Psychiatric/Behavioral: Negative for agitation, behavioral problems, confusion and hallucinations.     Objective:     Vital Signs (Most Recent):  Temp: 98.4 °F (36.9 °C) (06/09/19 1301)  Pulse: 103 (06/09/19 1331)  Resp: 20 (06/09/19 1331)  BP: (!) 169/77 (06/09/19 1331)  SpO2: 98 % (06/09/19 1331) Vital Signs (24h Range):  Temp:  [98 °F (36.7 °C)-98.6 °F (37 °C)] 98.4 °F (36.9 °C)  Pulse:  [] 103  Resp:  [16-34] 20  SpO2:  [76 %-100 %] 98 %  BP: (165-255)/() 169/77     Weight: 83.9 kg (185 lb)  Body mass index is 30.79 kg/m².    Physical Exam   Constitutional: She is oriented to person, place, and time. She appears well-developed and well-nourished. She appears distressed.   HENT:   Head: Normocephalic and atraumatic.   Mouth/Throat: No oropharyngeal exudate.   Eyes: Pupils are equal, round, and reactive to light. Conjunctivae and EOM are normal. No scleral icterus.   Neck: Normal range of motion. Neck supple. No JVD present.   Cardiovascular: Regular rhythm. Exam reveals no gallop and no friction rub.   No murmur heard.  Tachycardiac    Pulmonary/Chest: Effort normal and breath sounds normal. No stridor. No respiratory distress. She has no wheezes. She has no  rales.   Abdominal: Soft. Bowel sounds are normal. She exhibits no distension. There is tenderness. There is no rebound and no guarding.   Musculoskeletal: Normal range of motion. She exhibits no edema, tenderness or deformity.   Neurological: She is alert and oriented to person, place, and time. No sensory deficit.   Skin: Skin is warm and dry. Capillary refill takes less than 2 seconds. No rash noted. She is not diaphoretic. No erythema. No pallor.   Psychiatric: She has a normal mood and affect. Her behavior is normal. Judgment and thought content normal.   Nursing note and vitals reviewed.        CRANIAL NERVES     CN III, IV, VI   Pupils are equal, round, and reactive to light.  Extraocular motions are normal.        Significant Labs: All pertinent labs within the past 24 hours have been reviewed.    Significant Imaging: I have reviewed and interpreted all pertinent imaging results/findings within the past 24 hours.

## 2019-06-10 NOTE — HOSPITAL COURSE
Admitted to hospital medicine for hypertensive crisis, acute pulmonary edema, NSTEMI, abdominal pain with N/V. Cardiology and nephrology consulted. S/p HD 6/9. Patient was started on heparin infusion ACS protocol as her tropoinin increased 0.3 --> 1 --> 1.5 (peaked). S/p LHC 6/10 with PCI x3 FIDE (LCx/OM and RCA). Tolerated procedure well. HD post LHC. Patient's pressure went down some after HD. I titrated several BP meds on her. On dc day patient normotensive stable and afebrile. Eating well and ambulating well w/o any issues. I have extensive discussion with patient regarding preventive measures for CAD including lifestyle medications, medication compliance yonathan DAPT and HD. Her BP meds were changed on d/c and she needs to follow up with PCP, nephrology and cardiology to titrate these meds. Stable for d/c.

## 2019-06-10 NOTE — CARE UPDATE
Second set of troponin elevated at 1.   NPO after MN for possible cath tomorrow. Will discuss R/B with patient in am.

## 2019-06-10 NOTE — NURSING
Dr. Quiroz called and notified of /82 manual  on the monitor, PO medications were given and no change in BP, he asked what were her PRN medications. He advised to give labetalol Prn dose.

## 2019-06-11 ENCOUNTER — TELEPHONE (OUTPATIENT)
Dept: CARDIOLOGY | Facility: CLINIC | Age: 44
End: 2019-06-11

## 2019-06-11 PROBLEM — I20.0 UNSTABLE ANGINA PECTORIS: Status: ACTIVE | Noted: 2017-12-12

## 2019-06-11 PROBLEM — I95.1 ORTHOSTATIC HYPOTENSION: Status: ACTIVE | Noted: 2019-06-11

## 2019-06-11 LAB
ALBUMIN SERPL BCP-MCNC: 3.3 G/DL (ref 3.5–5.2)
ALP SERPL-CCNC: 76 U/L (ref 55–135)
ALT SERPL W/O P-5'-P-CCNC: 16 U/L (ref 10–44)
ANION GAP SERPL CALC-SCNC: 11 MMOL/L (ref 8–16)
AST SERPL-CCNC: 21 U/L (ref 10–40)
BASOPHILS # BLD AUTO: 0.03 K/UL (ref 0–0.2)
BASOPHILS NFR BLD: 0.4 % (ref 0–1.9)
BILIRUB SERPL-MCNC: 0.6 MG/DL (ref 0.1–1)
BUN SERPL-MCNC: 17 MG/DL (ref 6–20)
CALCIUM SERPL-MCNC: 9.2 MG/DL (ref 8.7–10.5)
CHLORIDE SERPL-SCNC: 96 MMOL/L (ref 95–110)
CO2 SERPL-SCNC: 30 MMOL/L (ref 23–29)
CREAT SERPL-MCNC: 5.1 MG/DL (ref 0.5–1.4)
DIFFERENTIAL METHOD: ABNORMAL
EOSINOPHIL # BLD AUTO: 0.2 K/UL (ref 0–0.5)
EOSINOPHIL NFR BLD: 1.8 % (ref 0–8)
ERYTHROCYTE [DISTWIDTH] IN BLOOD BY AUTOMATED COUNT: 14.6 % (ref 11.5–14.5)
EST. GFR  (AFRICAN AMERICAN): 11 ML/MIN/1.73 M^2
EST. GFR  (NON AFRICAN AMERICAN): 10 ML/MIN/1.73 M^2
ESTIMATED AVG GLUCOSE: 189 MG/DL (ref 68–131)
GLUCOSE SERPL-MCNC: 133 MG/DL (ref 70–110)
HBA1C MFR BLD HPLC: 8.2 % (ref 4–5.6)
HCT VFR BLD AUTO: 29.4 % (ref 37–48.5)
HGB BLD-MCNC: 9.5 G/DL (ref 12–16)
LYMPHOCYTES # BLD AUTO: 1.7 K/UL (ref 1–4.8)
LYMPHOCYTES NFR BLD: 19.6 % (ref 18–48)
MAGNESIUM SERPL-MCNC: 1.7 MG/DL (ref 1.6–2.6)
MCH RBC QN AUTO: 29.3 PG (ref 27–31)
MCHC RBC AUTO-ENTMCNC: 32.3 G/DL (ref 32–36)
MCV RBC AUTO: 91 FL (ref 82–98)
MONOCYTES # BLD AUTO: 0.8 K/UL (ref 0.3–1)
MONOCYTES NFR BLD: 8.9 % (ref 4–15)
NEUTROPHILS # BLD AUTO: 5.9 K/UL (ref 1.8–7.7)
NEUTROPHILS NFR BLD: 69.3 % (ref 38–73)
PLATELET # BLD AUTO: 265 K/UL (ref 150–350)
PMV BLD AUTO: 9.9 FL (ref 9.2–12.9)
POCT GLUCOSE: 135 MG/DL (ref 70–110)
POCT GLUCOSE: 199 MG/DL (ref 70–110)
POCT GLUCOSE: 211 MG/DL (ref 70–110)
POTASSIUM SERPL-SCNC: 4.2 MMOL/L (ref 3.5–5.1)
PROT SERPL-MCNC: 7 G/DL (ref 6–8.4)
RBC # BLD AUTO: 3.24 M/UL (ref 4–5.4)
SODIUM SERPL-SCNC: 137 MMOL/L (ref 136–145)
WBC # BLD AUTO: 8.51 K/UL (ref 3.9–12.7)

## 2019-06-11 PROCEDURE — 83735 ASSAY OF MAGNESIUM: CPT | Mod: NTX

## 2019-06-11 PROCEDURE — 85025 COMPLETE CBC W/AUTO DIFF WBC: CPT | Mod: NTX

## 2019-06-11 PROCEDURE — 99232 PR SUBSEQUENT HOSPITAL CARE,LEVL II: ICD-10-PCS | Mod: NTX,,, | Performed by: INTERNAL MEDICINE

## 2019-06-11 PROCEDURE — 25000003 PHARM REV CODE 250: Mod: NTX | Performed by: EMERGENCY MEDICINE

## 2019-06-11 PROCEDURE — 99232 SBSQ HOSP IP/OBS MODERATE 35: CPT | Mod: NTX,,, | Performed by: INTERNAL MEDICINE

## 2019-06-11 PROCEDURE — 36415 COLL VENOUS BLD VENIPUNCTURE: CPT | Mod: NTX

## 2019-06-11 PROCEDURE — 21400001 HC TELEMETRY ROOM: Mod: NTX

## 2019-06-11 PROCEDURE — 25000003 PHARM REV CODE 250: Mod: NTX | Performed by: INTERNAL MEDICINE

## 2019-06-11 PROCEDURE — 80053 COMPREHEN METABOLIC PANEL: CPT | Mod: NTX

## 2019-06-11 PROCEDURE — 94761 N-INVAS EAR/PLS OXIMETRY MLT: CPT | Mod: NTX

## 2019-06-11 RX ORDER — METOPROLOL SUCCINATE 25 MG/1
25 TABLET, EXTENDED RELEASE ORAL DAILY
Status: DISCONTINUED | OUTPATIENT
Start: 2019-06-11 | End: 2019-06-12

## 2019-06-11 RX ORDER — ASPIRIN 81 MG/1
81 TABLET ORAL DAILY
Status: DISCONTINUED | OUTPATIENT
Start: 2019-06-12 | End: 2019-06-12 | Stop reason: HOSPADM

## 2019-06-11 RX ADMIN — METOCLOPRAMIDE HYDROCHLORIDE 5 MG: 5 SOLUTION ORAL at 05:06

## 2019-06-11 RX ADMIN — SEVELAMER CARBONATE 1600 MG: 800 TABLET, FILM COATED ORAL at 09:06

## 2019-06-11 RX ADMIN — INSULIN ASPART 2 UNITS: 100 INJECTION, SOLUTION INTRAVENOUS; SUBCUTANEOUS at 05:06

## 2019-06-11 RX ADMIN — SEVELAMER CARBONATE 1600 MG: 800 TABLET, FILM COATED ORAL at 05:06

## 2019-06-11 RX ADMIN — INSULIN DETEMIR 5 UNITS: 100 INJECTION, SOLUTION SUBCUTANEOUS at 09:06

## 2019-06-11 RX ADMIN — ROSUVASTATIN CALCIUM 5 MG: 5 TABLET, FILM COATED ORAL at 09:06

## 2019-06-11 RX ADMIN — METOCLOPRAMIDE HYDROCHLORIDE 5 MG: 5 SOLUTION ORAL at 09:06

## 2019-06-11 RX ADMIN — SODIUM CHLORIDE 250 ML: 0.9 INJECTION, SOLUTION INTRAVENOUS at 02:06

## 2019-06-11 RX ADMIN — ASPIRIN 325 MG ORAL TABLET 325 MG: 325 PILL ORAL at 09:06

## 2019-06-11 RX ADMIN — CLOPIDOGREL BISULFATE 75 MG: 75 TABLET ORAL at 09:06

## 2019-06-11 RX ADMIN — MUPIROCIN: 20 OINTMENT TOPICAL at 09:06

## 2019-06-11 RX ADMIN — PANTOPRAZOLE SODIUM 40 MG: 40 TABLET, DELAYED RELEASE ORAL at 09:06

## 2019-06-11 RX ADMIN — INSULIN ASPART 1 UNITS: 100 INJECTION, SOLUTION INTRAVENOUS; SUBCUTANEOUS at 09:06

## 2019-06-11 RX ADMIN — SEVELAMER CARBONATE 1600 MG: 800 TABLET, FILM COATED ORAL at 02:06

## 2019-06-11 RX ADMIN — METOCLOPRAMIDE HYDROCHLORIDE 5 MG: 5 SOLUTION ORAL at 02:06

## 2019-06-11 RX ADMIN — NEPHROCAP 1 CAPSULE: 1 CAP ORAL at 09:06

## 2019-06-11 NOTE — PROGRESS NOTES
[facility] Medicine  Progress Note    Patient Name: Gilda Schroeder  MRN: [id number]  Patient Class: IP- Inpatient   Admission Date: 6/9/2019  Length of Stay: 2 days  Attending Physician: Elroy Batres MD  Primary Care Provider: Roselia Rose MD    Subjective:     Principal Problem:Acute respiratory failure with hypoxia    HPI:  44 yof with hx of ESRD on HD, DM type 2, CAD s/p PCI (2017), HTN, HLD, hx of chronic abdominal pain and gastroparesis presenting with sudden onset abdominal pain with associated intractable nausea, emesis, non radiating chest tightness, and dyspnea since early this AM. Patient denies any fevers, chills, diaphoresis, syncope, visual disturbances, dizziness, palpitations, left arm pain, hemoptysis, diarrhea, or constipation. Last dialysis was Friday, and she get 4x HD per week. No recent travel or sick contacts.    In the ED patient was hypertensive sbp 250s, tachycardic, tachypenic hypoxic 76% requiring O2. Labs significant for BNP 2443, trop 0.39, Cr 12, K 6.2. EKG NRS with ST abnormalities concerning for lateral ischemia. CXR noting pulmonary vascular congestion. Nephrology and cardiology consulted.  paged for admission.    Hospital Course:  Admitted to hospital medicine for hypertensive crisis, acute pulmonary edema, NSTEMI, abdominal pain with N/V. Cardiology and nephrology consulted. S/p HD 6/9. Patient was started on heparin infusion ACS protocol as her tropoinin increased 0.3 --> 1 --> 1.5 (peaked). S/p LHC 6/10 with PCI x3 FIDE (LCx/OM and RCA). Tolerated procedure well. HD post LHC.    Interval hx: Low BP this AM. Antihypertensives held. Felt dizzy. Orthostatics positive. Denies CP or sob. Doing well otherwise. Feels significantly better since her LHC.    Review of Systems   Constitutional: Negative for chills, diaphoresis, fatigue and fever.   HENT: Negative for congestion, rhinorrhea, sinus pressure, sore throat and trouble swallowing.     Eyes: Negative for photophobia and visual disturbance.   Respiratory: Negative for cough, chest tightness, shortness of breath and wheezing.    Cardiovascular: Negative for chest pain, palpitations and leg swelling.   Gastrointestinal: Negative for abdominal distention, abdominal pain, blood in stool, constipation, diarrhea, nausea and vomiting.   Genitourinary: Negative for flank pain and pelvic pain.   Musculoskeletal: Negative for arthralgias, back pain, joint swelling, myalgias and neck stiffness.   Skin: Negative for color change, pallor, rash and wound.   Neurological: Positive for dizziness. Negative for seizures, syncope, speech difficulty, weakness, light-headedness, numbness and headaches.   Psychiatric/Behavioral: Negative for agitation, behavioral problems, confusion and hallucinations.     Objective:     Vital Signs (Most Recent):  Temp: 98.7 °F (37.1 °C) (06/11/19 1124)  Pulse: 103 (06/11/19 1124)  Resp: 19 (06/11/19 1124)  BP: (!) 81/45 (06/11/19 1124)  SpO2: 95 % (06/11/19 1124) Vital Signs (24h Range):  Temp:  [97.8 °F (36.6 °C)-98.7 °F (37.1 °C)] 98.7 °F (37.1 °C)  Pulse:  [] 103  Resp:  [16-19] 19  SpO2:  [93 %-97 %] 95 %  BP: ()/(44-92) 81/45     Weight: 78.6 kg (173 lb 4.5 oz)  Body mass index is 32.74 kg/m².    Physical Exam   Constitutional: She is oriented to person, place, and time. She appears well-developed and well-nourished. No distress.   HENT:   Head: Normocephalic and atraumatic.   Mouth/Throat: No oropharyngeal exudate.   Eyes: Pupils are equal, round, and reactive to light. Conjunctivae and EOM are normal. No scleral icterus.   Neck: Normal range of motion. Neck supple. No JVD present.   Cardiovascular: Normal rate, regular rhythm and normal heart sounds. Exam reveals no gallop and no friction rub.   No murmur heard.  Pulmonary/Chest: Effort normal and breath sounds normal. No stridor. No respiratory distress. She has no wheezes. She has no rales.   Abdominal: Soft.  Bowel sounds are normal. She exhibits no distension. There is no tenderness. There is no rebound and no guarding.   Musculoskeletal: Normal range of motion. She exhibits no edema, tenderness or deformity.   Neurological: She is alert and oriented to person, place, and time. No sensory deficit.   Skin: Skin is warm and dry. Capillary refill takes less than 2 seconds. No rash noted. She is not diaphoretic. No erythema. No pallor.   Psychiatric: She has a normal mood and affect. Her behavior is normal. Judgment and thought content normal.   Nursing note and vitals reviewed.        CRANIAL NERVES     CN III, IV, VI   Pupils are equal, round, and reactive to light.  Extraocular motions are normal.        Significant Labs: All pertinent labs within the past 24 hours have been reviewed.    Significant Imaging: I have reviewed and interpreted all pertinent imaging results/findings within the past 24 hours.    Assessment/Plan:      * Acute respiratory failure with hypoxia  Presenting with chest pain, abdominal pain, intractable N/V, found to have hypertensive crisis, elevated trop/bnp, cxr with vascular congestion. Concern for cardiac ischemia. Pulmonary edema likely from malignant hypertension and cardiac ischemia. Improved post HD and LHC s/p PCI x 3. No saturating well on RA.    NSTEMI (non-ST elevated myocardial infarction)  Presenting with chest tightness. Hx of CAD with stents x 2 in 2017. EKG concerning for lateral ischemia. Cardiology consulted. Cath in March of 2019 showed patent RCA stents, nonobstructive disease in LAD and circumflex. Trop 0.4 --> 1 --> 1.5 peaked. Started on ACS protocol with heparin drip, will give 325 asa. TTE noting reduced EF with systolic and diastolic dysfunction , wall motion abnormalities. S/p LHC with PCI x 3.  - Resolved  - Continue BB, Lipitor, plavix, daily ASA  - Aggressive risk modifications (med compliance, dietary restrictions, fluid restriction etc)  - Cardiac rehab as per  cardiology    Hypertensive crisis  Missed her meds prior to admission. NSTEMI, pain and nausea contributing. HD as per nephro  Low BP today with orthostatics  Held home meds, resume as able    Unstable angina pectoris  As above    Orthostatic hypotension  Suspecting secondary to HD  Held all antihypertensive this morning  Give gentle fluids for few hrs today  Resume antihypertensive as able      Hyperkalemia  HD as per nephro      CAD (coronary artery disease)  As above    ESRD (end stage renal disease)  HD as per nephro      Type 2 diabetes mellitus, with long-term current use of insulin  Resume home insulin, titrate as needed  SSI and accuchecks        VTE Risk Mitigation (From admission, onward)        Ordered     IP VTE HIGH RISK PATIENT  Once      06/09/19 1455     Place sequential compression device  Until discontinued      06/09/19 1455              Elroy Batres MD  Department of Hospital Medicine   [facility]

## 2019-06-11 NOTE — SUBJECTIVE & OBJECTIVE
Review of Systems   Gastrointestinal: Negative for melena.   Genitourinary: Negative for hematuria.     Objective:     Vital Signs (Most Recent):  Temp: 98 °F (36.7 °C) (06/11/19 0714)  Pulse: 91 (06/11/19 0910)  Resp: 19 (06/11/19 0714)  BP: (!) 88/49 (06/11/19 0910)  SpO2: (!) 93 % (06/11/19 0714) Vital Signs (24h Range):  Temp:  [97.8 °F (36.6 °C)-98.5 °F (36.9 °C)] 98 °F (36.7 °C)  Pulse:  [] 91  Resp:  [16-19] 19  SpO2:  [92 %-97 %] 93 %  BP: ()/() 88/49     Weight: 78.6 kg (173 lb 4.5 oz)  Body mass index is 32.74 kg/m².     SpO2: (!) 93 %  O2 Device (Oxygen Therapy): room air      Intake/Output Summary (Last 24 hours) at 6/11/2019 1105  Last data filed at 6/11/2019 0138  Gross per 24 hour   Intake 600 ml   Output 1000 ml   Net -400 ml       Lines/Drains/Airways     Drain                 Hemodialysis AV Fistula 10/13/18 2350 Left upper arm 240 days          Airway                 Airway - Non-Surgical 03/14/19 0815 Nasal Cannula 89 days          Peripheral Intravenous Line                 Peripheral IV - Single Lumen 06/09/19 0744 20 G Right Antecubital 2 days                Physical Exam   Constitutional: She is oriented to person, place, and time. She appears well-developed and well-nourished. No distress.   HENT:   Head: Normocephalic and atraumatic.   Mouth/Throat: No oropharyngeal exudate.   Eyes: Pupils are equal, round, and reactive to light. Conjunctivae and EOM are normal. No scleral icterus.   Neck: Normal range of motion. Neck supple. No JVD present. No tracheal deviation present. No thyromegaly present.   Cardiovascular: Normal rate, regular rhythm, S1 normal and S2 normal. Exam reveals no gallop and no friction rub.   No murmur heard.  R rad access site c/d/i.   Pulmonary/Chest: Effort normal and breath sounds normal. No respiratory distress. She has no wheezes. She has no rales. She exhibits no tenderness.   Abdominal: Soft. She exhibits no distension.   Musculoskeletal:  Normal range of motion. She exhibits no edema.   Neurological: She is alert and oriented to person, place, and time. No cranial nerve deficit.   Skin: Skin is warm and dry. She is not diaphoretic.   Psychiatric: She has a normal mood and affect. Her behavior is normal. Judgment normal.       Current Medications:   aspirin  325 mg Oral Daily    cloNIDine  0.2 mg Oral BID    clopidogrel  75 mg Oral Daily    [START ON 6/12/2019] epoetin huy-ebpx (RETACRIT) injection  10,000 Units Intravenous Every Mon, Wed, Fri    insulin detemir U-100  5 Units Subcutaneous BID    metoclopramide HCl  5 mg Oral QID    mupirocin   Nasal BID    pantoprazole  40 mg Oral Daily    rosuvastatin  5 mg Oral Daily    sevelamer carbonate  1,600 mg Oral TID WM    vitamin renal formula (B-complex-vitamin c-folic acid)  1 capsule Oral Daily      nitroGLYCERIN       sodium chloride 0.9%, acetaminophen, dextrose 50%, dextrose 50%, glucagon (human recombinant), glucose, glucose, hydrALAZINE, HYDROcodone-acetaminophen, HYDROmorphone, insulin aspart U-100, labetalol, morphine, nitroGLYCERIN, ondansetron, ondansetron, polyethylene glycol, prochlorperazine, promethazine (PHENERGAN) IVPB, senna-docusate 8.6-50 mg, sodium chloride 0.9%    Laboratory (all labs reviewed):  CBC:  Recent Labs   Lab 03/14/19  0640 03/16/19 0922 06/09/19  0900 06/10/19  0624 06/11/19  0655   WHITE BLOOD CELL COUNT 8.84 9.54 10.72 13.61 H 8.51   HEMOGLOBIN 13.3 13.7 10.1 L 11.0 L 9.5 L   HEMATOCRIT 41.6 41.6 31.6 L 34.2 L 29.4 L   PLATELETS 210 232 259 310 265       CHEMISTRIES:  Recent Labs   Lab 01/12/18  0507  03/14/19  0640 03/16/19  0922 06/09/19  0900 06/10/19  0624 06/11/19  0655   GLUCOSE 110   < > 119 H 152 H 315 H 308 H 133 H   SODIUM 141   < > 133 L 135 L 138 133 L 137   POTASSIUM 4.2   < > 4.0 5.0 6.2 H 5.3 H 4.2   BUN BLD 24 H   < > 35 H 47 H 65 H 33 H 17   CREATININE 9.23 H   < > 8.78 H 9.30 H 12.7 H 8.7 H 5.1 H   EGFR IF  6 A   < > 6 A 5  A 4 A 6 A 11 A   EGFR IF NON- 5 A   < > 5 A 5 A 3 A 5 A 10 A   CALCIUM 9.6   < > 10.3 H 10.0 9.4 11.2 H 9.2   MAGNESIUM 2.2  --   --   --  2.0 1.9 1.7    < > = values in this interval not displayed.       CARDIAC BIOMARKERS:  Recent Labs   Lab 01/16/19  1200  06/09/19  0900 06/09/19  1532 06/09/19  2053 06/10/19  0624 06/10/19  1137     --   --   --   --   --   --    TROPONIN I 3.620 HH   < > 0.394 H 1.038 H 1.569 H 1.178 H 0.804 H    < > = values in this interval not displayed.       COAGS:  Recent Labs   Lab 01/16/19  1620 01/19/19  0750 03/11/19  1648 06/09/19  0900 06/09/19  2053   INR 1.4 1.0 1.0 1.0 1.0       LIPIDS/LFTS:  Recent Labs   Lab 07/05/16  0732 12/11/17  0454  03/13/19  0656 03/16/19  0922 06/09/19  0900 06/10/19  0624 06/11/19  0655   CHOLESTEROL 221 H 176  --   --   --   --   --   --    TRIGLYCERIDES 449 H 204 H  --   --   --   --   --   --    HDL 35 L 39 L  --   --   --   --   --   --    LDL CHOLESTEROL Invalid, Trig>400.0 96.2  --   --   --   --   --   --    NON-HDL CHOLESTEROL 186 137  --   --   --   --   --   --    AST 19  --    < > 26 43 H 19 24 21   ALT 15  --    < > 43 22 20 24 16    < > = values in this interval not displayed.       BNP:  Recent Labs   Lab 06/09/19  0900   BNP 2,443 H       TSH:  Recent Labs   Lab 12/10/17  2300 01/10/18  1627 03/01/18  1034   TSH 2.260 2.430 1.722       Free T4:  Recent Labs   Lab 01/10/18  1627   FREE T4 1.09       Diagnostic Results:  Echo: 6/10/19 (images personally reviewed and interpreted)  · Moderately decreased left ventricular systolic function. The estimated ejection fraction is 35%  · Mild-moderate global hypokinetic wall motion.  · Mild concentric left ventricular hypertrophy.  · Grade III (severe) left ventricular diastolic dysfunction consistent with restrictive physiology.  · Normal right ventricular systolic function.  · Mild mitral regurgitation.  · Mild tricuspid regurgitation.  · The estimated PA systolic pressure  is 59 mm Hg  · Pulmonary hypertension present.    Cath: 6/10/19 (Dr. Guzman)  Description of the findings of the procedure:   1. PCI of mid to distal RCA with drug-eluting stent x1  2. PCI of OM1 with drug-eluting stent x1  3. PCI of mid circ with drug-eluting stent x1  Findings/Key Components:  LVEDP: 12 mmHg  LM:  No significant stenosis  LAD:  Proximal 20% stenosis  LCx:  Mid circ has a severe 70-80 % eccentric stenosis right after the takeoff of 1st OM.  IFR was performed across it and was found to be 0.59.  Successful PCI with drug-eluting stent x 1.  Post dilated at high atmospheric pressures with excellent angiographic results and BRITTANY 3 flow post PCI  OM1 has a 70% stenosis IFR was performed across it and was found to be 0.83.  PCI was performed with drug-eluting stent x1.  Post dilated at high atmospheric pressures with excellent angiographic results and BRITTANY 3 flow post PCI  RCA:  Previously placed RCA stents are patent.  Distal to the RCA stent there is a long 70% stenosis.  IFR was performed across it and it was found to be 0.86.  PCI of RCA performed with drug-eluting stent x1.  Post dilated at high atmospheric pressures with excellent angiographic results and BRITTANY 3 flow post PCI  Hemostasis:  R Radial band  Impression:  Hemodynamically significant stenosis of RCA, mid circ and OM1.  PCI of RCA, mid circ and OM1 with drug-eluting stents  Plan:  Aspirin 81 mg daily indefinitely  Plavix 75 mg daily for at least 1 year uninterrupted.  Longer if no contraindications.  Aggressive risk factor modification  Follow-up in Cardiology Clinic in 1 week

## 2019-06-11 NOTE — PLAN OF CARE
"SW met with patient to complete discharge planning assessment. Prior to beginning the discharge planning assessment, patient verified name and date of birth. SW educated patient on the discharge process and explained that discharge planning begins at admission. SW reviewed contents of the "[facility] Packet" emphasizing, "Help at home", "Managing your health", and "Your preferences". Patient stated stated that her daughter and  are her help at home.  SW wrote name and phone number on white communication board.       06/11/19 3660   Discharge Assessment   Assessment Type Discharge Planning Assessment   Confirmed/corrected address and phone number on facesheet? Yes   Assessment information obtained from? Patient   Communicated expected length of stay with patient/caregiver yes   Prior to hospitilization cognitive status: Alert/Oriented   Prior to hospitalization functional status: Independent   Current cognitive status: Alert/Oriented   Current Functional Status: Independent   Facility Arrived From: Home   Lives With spouse;child(chilo), dependent   Able to Return to Prior Arrangements yes   Is patient able to care for self after discharge? Yes   Who are your caregiver(s) and their phone number(s)? Kenrick () [phone]; Kayla (Daughter) [phone]   Patient's perception of discharge disposition home or selfcare   Readmission Within the Last 30 Days no previous admission in last 30 days   Patient currently being followed by outpatient case management? No   Patient currently receives home health services? No   Patient currently receives any other outside agency services? No   Equipment Currently Used at Home other (see comments)  (Home Dialysis)   Do you have any problems affording any of your prescribed medications? No   Is the patient taking medications as prescribed? yes   Does the patient have transportation home? Yes   Transportation Anticipated family or friend will provide   Dialysis Name and " Scheduled days Patient does Dialysis at home 4 days a week.    Does the patient receive services at the Coumadin Clinic? No   Discharge Plan A Home;Home with family  (F/U PCP)   Discharge Plan B Home   DME Needed Upon Discharge  none   Patient/Family in Agreement with Plan yes     Roselia Rose MD      [facility] [address] [place] [address]  [address] [facility] [address]  Phone: [phone] Fax: [phone]      Patient prefers mid morning appointments.

## 2019-06-11 NOTE — PROGRESS NOTES
HECTOR spoke with Iman at [facility] to schedule an appointment with Dr. Roselia Perez. Iman informed SW that patient had not seen Dr. Perez since 2016 and would have to send his nurse a message to schedule the appointment because he is a specialist. Iman informed HECTOR that she could schedule patient an appointment with a NP Leonie Hou and if she needed to see Dr. Perez, they would let them know.     Follow-up Information     TALAT Mcgrath. Go on 6/14/2019.    Specialty:  Family Medicine  Why:  Outpatient Services: Hospital F/U with NP at 10:00 am at [address] [address] [address]  Contact information:  [address] [place] [address]  [address]  [address] [facility] [address] [phone]

## 2019-06-11 NOTE — ASSESSMENT & PLAN NOTE
NSTEMI  S/p MV PCI 6/10/19 (LCx/OM and RCA)  EF 35% by echo  Cont med rx:  ASA 81mg qd indef  Plavix 75mg qd for 1 year (thru 6/2020)  Start toprol 25mg qd  Stop clonidine  Dispo planning appropriate  Pt to follow up with her cardiologist in [place], [place]  Importance of compliance with med rx, yonathan DAPT stressed to pt and family in attendance.

## 2019-06-11 NOTE — PROGRESS NOTES
Received report from AI Moy (Dialysis)- HD intake: 600mL & Total UF: 1000.     Patient AAOx4, returned to unit from dialysis escorted by transport. Telemetry monitoring in place, no distress noted.     Patient's BP continued to run low 80-90s/40-50s.- MD notified, no new orders- Communicated to day shift nurse, AI Mon.

## 2019-06-11 NOTE — CONSULTS
Patient's cardiology appointment was scheduled with:    .  Follow-up Information     TALAT Mcgrath. Go on 6/14/2019.    Specialty:  Family Medicine  Why:  Outpatient Services: [facility] with NP at 10:00 am at [address] [address] [address]  Contact information:  [address] [address]  [address]  [facility] [address]  [phone]             Cici Tineo PA-C. Go on 6/19/2019.    Specialty:  Cardiology  Why:  Outpatient Services: [facility] with Cardiologist at 2:00 pm  Contact information:  [address] [place] [address]  [phone]

## 2019-06-11 NOTE — ASSESSMENT & PLAN NOTE
Presenting with chest tightness. Hx of CAD with stents x 2 in 2017. EKG concerning for lateral ischemia. Cardiology consulted. Cath in March of 2019 showed patent RCA stents, nonobstructive disease in LAD and circumflex. Trop 0.4 --> 1 --> 1.5 peaked. Started on ACS protocol with heparin drip, will give 325 asa. TTE noting reduced EF with systolic and diastolic dysfunction , wall motion abnormalities. S/p LHC with PCI x 3.  - Resolved  - Continue BB, Lipitor, plavix, daily ASA  - Aggressive risk modifications (med compliance, dietary restrictions, fluid restriction etc)  - Cardiac rehab as per cardiology

## 2019-06-11 NOTE — ASSESSMENT & PLAN NOTE
Presenting with chest pain, abdominal pain, intractable N/V, found to have hypertensive crisis, elevated trop/bnp, cxr with vascular congestion. Concern for cardiac ischemia. Pulmonary edema likely from malignant hypertension and cardiac ischemia. Improved post HD and LHC s/p PCI x 3. No saturating well on RA.

## 2019-06-11 NOTE — NURSING
Patient bedside report has been completed and given to AI Slater. Chart check has been completed. NAD noted. Patient went for left heart cath today. 3 stents were placed. Patient returned to room 303 at 1820. Dialysis is to occur sometime after 1900 6/10/19 per dialysis nurse.

## 2019-06-11 NOTE — ASSESSMENT & PLAN NOTE
Suspecting secondary to HD  Held all antihypertensive this morning  Give gentle fluids for few hrs today  Resume antihypertensive as able

## 2019-06-11 NOTE — ASSESSMENT & PLAN NOTE
Missed her meds prior to admission. NSTEMI, pain and nausea contributing. HD as per nephro  Low BP today with orthostatics  Held home meds, resume as able

## 2019-06-11 NOTE — TELEPHONE ENCOUNTER
----- Message from Socorro King LMSW sent at 6/11/2019  1:02 PM CDT -----  Contact: Socorro King  Patient in need of a follow up appointment within 1-2 weeks.

## 2019-06-11 NOTE — PROGRESS NOTES
Patient awake & alert, transported to dialysis via transport /56 HR 76- BP meds not given, telemetry monitoring in place, no distress. Pressure dressing fully deflated.

## 2019-06-11 NOTE — SUBJECTIVE & OBJECTIVE
Interval hx: Low BP this AM. Antihypertensives held. Felt dizzy. Orthostatics positive. Denies CP or sob. Doing well otherwise. Feels significantly better since her [facility].    Review of Systems   Constitutional: Negative for chills, diaphoresis, fatigue and fever.   HENT: Negative for congestion, rhinorrhea, sinus pressure, sore throat and trouble swallowing.    Eyes: Negative for photophobia and visual disturbance.   Respiratory: Negative for cough, chest tightness, shortness of breath and wheezing.    Cardiovascular: Negative for chest pain, palpitations and leg swelling.   Gastrointestinal: Negative for abdominal distention, abdominal pain, blood in stool, constipation, diarrhea, nausea and vomiting.   Genitourinary: Negative for flank pain and pelvic pain.   Musculoskeletal: Negative for arthralgias, back pain, joint swelling, myalgias and neck stiffness.   Skin: Negative for color change, pallor, rash and wound.   Neurological: Positive for dizziness. Negative for seizures, syncope, speech difficulty, weakness, light-headedness, numbness and headaches.   Psychiatric/Behavioral: Negative for agitation, behavioral problems, confusion and hallucinations.     Objective:     Vital Signs (Most Recent):  Temp: 98.7 °F (37.1 °C) (06/11/19 1124)  Pulse: 103 (06/11/19 1124)  Resp: 19 (06/11/19 1124)  BP: (!) 81/45 (06/11/19 1124)  SpO2: 95 % (06/11/19 1124) Vital Signs (24h Range):  Temp:  [97.8 °F (36.6 °C)-98.7 °F (37.1 °C)] 98.7 °F (37.1 °C)  Pulse:  [] 103  Resp:  [16-19] 19  SpO2:  [93 %-97 %] 95 %  BP: ()/(44-92) 81/45     Weight: 78.6 kg (173 lb 4.5 oz)  Body mass index is 32.74 kg/m².    Physical Exam   Constitutional: She is oriented to person, place, and time. She appears well-developed and well-nourished. No distress.   HENT:   Head: Normocephalic and atraumatic.   Mouth/Throat: No oropharyngeal exudate.   Eyes: Pupils are equal, round, and reactive to light. Conjunctivae and EOM are normal. No  scleral icterus.   Neck: Normal range of motion. Neck supple. No JVD present.   Cardiovascular: Normal rate, regular rhythm and normal heart sounds. Exam reveals no gallop and no friction rub.   No murmur heard.  Pulmonary/Chest: Effort normal and breath sounds normal. No stridor. No respiratory distress. She has no wheezes. She has no rales.   Abdominal: Soft. Bowel sounds are normal. She exhibits no distension. There is no tenderness. There is no rebound and no guarding.   Musculoskeletal: Normal range of motion. She exhibits no edema, tenderness or deformity.   Neurological: She is alert and oriented to person, place, and time. No sensory deficit.   Skin: Skin is warm and dry. Capillary refill takes less than 2 seconds. No rash noted. She is not diaphoretic. No erythema. No pallor.   Psychiatric: She has a normal mood and affect. Her behavior is normal. Judgment and thought content normal.   Nursing note and vitals reviewed.        CRANIAL NERVES     CN III, IV, VI   Pupils are equal, round, and reactive to light.  Extraocular motions are normal.        Significant Labs: All pertinent labs within the past 24 hours have been reviewed.    Significant Imaging: I have reviewed and interpreted all pertinent imaging results/findings within the past 24 hours.

## 2019-06-11 NOTE — PROGRESS NOTES
HECTOR messaged patient's Cardiologist to schedule an appointment within 1-2 weeks. The systems next available appointment was 7/19/2019.

## 2019-06-11 NOTE — PROGRESS NOTES
Renal Progress Note    Date of Admission:  6/9/2019  7:23 AM    Length of Stay: 2  Days    Subjective: feels better.    Objective:    Current Facility-Administered Medications   Medication    0.9%  NaCl infusion    acetaminophen tablet 650 mg    aspirin tablet 325 mg    cloNIDine tablet 0.2 mg    clopidogrel tablet 75 mg    dextrose 50% injection 12.5 g    dextrose 50% injection 25 g    glucagon (human recombinant) injection 1 mg    glucose chewable tablet 16 g    glucose chewable tablet 24 g    hydrALAZINE injection 10 mg    HYDROcodone-acetaminophen 5-325 mg per tablet 1 tablet    hydromorphone (PF) injection 1 mg    insulin aspart U-100 pen 0-5 Units    insulin detemir U-100 pen 5 Units    labetalol injection 10 mg    metoclopramide HCl 5 mg/5 mL solution 5 mg    morphine injection 3 mg    mupirocin 2 % ointment    nitroGLYCERIN 50 mg in dextrose 5 % 250 mL infusion (NON-TITRATING)    ondansetron injection 4 mg    ondansetron injection 8 mg    pantoprazole EC tablet 40 mg    polyethylene glycol packet 17 g    prochlorperazine injection Soln 10 mg    promethazine (PHENERGAN) 25 mg in dextrose 5 % 50 mL IVPB    rosuvastatin tablet 5 mg    senna-docusate 8.6-50 mg per tablet 1 tablet    sevelamer carbonate tablet 1,600 mg    sodium chloride 0.9% flush 10 mL    vitamin renal formula (B-complex-vitamin c-folic acid) 1 mg per capsule 1 capsule       Vitals:    06/11/19 0242 06/11/19 0450 06/11/19 0714 06/11/19 0910   BP: (!) 92/51 (!) 95/47 (!) 84/47 (!) 88/49   BP Location: Left arm Right arm Left arm Right arm   Patient Position: Lying Lying Lying Lying   Pulse: 89 77 88 91   Resp: 18 18 19    Temp: 97.9 °F (36.6 °C) 97.9 °F (36.6 °C) 98 °F (36.7 °C)    TempSrc: Oral Oral Oral    SpO2: (!) 93% (!) 93% (!) 93%    Weight:  78.6 kg (173 lb 4.5 oz)     Height:           I/O last 3 completed shifts:  In: 600 [Other:600]  Out: 1000 [Other:1000]  No intake/output data  recorded.      Physical Exam:    NAD  Neck: supple  Heart: RRR   Lungs: Unlabored breathing  Abdomen: n/a  : n/a  Extremities:  n/a  Neurologic: Awake, alert, oriented x 3      Laboratories:    Recent Labs   Lab 06/11/19  0655   WBC 8.51   RBC 3.24*   HGB 9.5*   HCT 29.4*      MCV 91   MCH 29.3   MCHC 32.3       Recent Labs   Lab 06/11/19  0655   CALCIUM 9.2   PROT 7.0      K 4.2   CO2 30*   CL 96   BUN 17   CREATININE 5.1*   ALKPHOS 76   ALT 16   AST 21   BILITOT 0.6       No results for input(s): COLORU, CLARITYU, SPECGRAV, PHUR, PROTEINUA, GLUCOSEU, BLOODU, WBCU, RBCU, BACTERIA, MUCUS in the last 24 hours.    Invalid input(s):  BILIRUBINCON    Microbiology Results (last 7 days)     Procedure Component Value Units Date/Time    Blood culture #1 [[id number]] Collected:  06/09/19 0855    Order Status:  Completed Specimen:  Blood from Peripheral, Upper Arm, Right Updated:  06/10/19 1103     Blood Culture, Routine No Growth to date     Blood Culture, Routine No Growth to date    Narrative:       Blood Culture #1    Blood culture #2 [[id number]] Collected:  06/09/19 0900    Order Status:  Completed Specimen:  Blood from Peripheral, Upper Arm, Right Updated:  06/10/19 1103     Blood Culture, Routine No Growth to date     Blood Culture, Routine No Growth to date    Narrative:       Blood Culture #2            Diagnostic Tests:   X-Ray Chest AP Portable [[id number]] Resulted: 06/09/19 0750   Order Status: Completed      Impression:       Cardiomegaly with prominent central pulmonary vasculature and mildly accentuated interstitial markings suggestive of interstitial edema, similar to prior examination.      Electronically signed by: Rhett Barbosa MD  Date: 06/09/2019  Time: 07:50           Assessment:  43 y/o AAF with Hx. Of ESRD on home Hemodialysis admitted with:    - NSTEMI s/p LHC with PCI x 3   - s/p Hypertensive urgency  - s/p hypoxic respiratory failure due to pulmonary edema  - Abdominal pain with  intractable nausea/emesis RESOLVED  - Hyperkalemia RESOLVED  - BP on the low side today  - DM-2  - Hx. 2nd. Hyper-parathyroidism  - Anemia of CKD  - Hyperphosphatemia  - Hypercalcemia improved         Plan:      - Dialysis in a.m. (MWF)  - HOLDING Calcitriol  - Hold BP meds PRN systolic < 115 mmHg  - Resume Epogen w/ HD  - Sevelamer 1.6g w/ each meal  - f/u serum PO4-  - Glycemic control per admitting  - Home when cleared by Cardiology

## 2019-06-11 NOTE — NURSING
Bedside shift report received from AI Slater. Communication board has been updated. NAD noted. Will continue to monitor. Patient is sleeping and easily aroused.  is at the bedside.

## 2019-06-11 NOTE — PROGRESS NOTES
[facility]  Cardiology  Progress Note    Patient Name: Gilda Schroeder  MRN: [id number]  Admission Date: 6/9/2019  Hospital Length of Stay: 2 days  Code Status: Full Code   Attending Physician: Elroy Batres MD   Primary Care Physician: Primary Doctor No  Expected Discharge Date:   Principal Problem:Acute respiratory failure with hypoxia    Subjective:     Hospital Course:   6/10/19: PCI x3 FIDE (LCx/OM and RCA)    Interval Hx: no further CP/sob/abd pain.  No palps/LH.  No complication r/t R rad access.    Tele: SR 90s (personally reviewed and interpreted)        Review of Systems   Gastrointestinal: Negative for melena.   Genitourinary: Negative for hematuria.     Objective:     Vital Signs (Most Recent):  Temp: 98 °F (36.7 °C) (06/11/19 0714)  Pulse: 91 (06/11/19 0910)  Resp: 19 (06/11/19 0714)  BP: (!) 88/49 (06/11/19 0910)  SpO2: (!) 93 % (06/11/19 0714) Vital Signs (24h Range):  Temp:  [97.8 °F (36.6 °C)-98.5 °F (36.9 °C)] 98 °F (36.7 °C)  Pulse:  [] 91  Resp:  [16-19] 19  SpO2:  [92 %-97 %] 93 %  BP: ()/() 88/49     Weight: 78.6 kg (173 lb 4.5 oz)  Body mass index is 32.74 kg/m².     SpO2: (!) 93 %  O2 Device (Oxygen Therapy): room air      Intake/Output Summary (Last 24 hours) at 6/11/2019 1105  Last data filed at 6/11/2019 0138  Gross per 24 hour   Intake 600 ml   Output 1000 ml   Net -400 ml       Lines/Drains/Airways     Drain                 Hemodialysis AV Fistula 10/13/18 2350 Left upper arm 240 days          Airway                 Airway - Non-Surgical 03/14/19 0815 Nasal Cannula 89 days          Peripheral Intravenous Line                 Peripheral IV - Single Lumen 06/09/19 0744 20 G Right Antecubital 2 days                Physical Exam   Constitutional: She is oriented to person, place, and time. She appears well-developed and well-nourished. No distress.   HENT:   Head: Normocephalic and atraumatic.   Mouth/Throat: No oropharyngeal exudate.   Eyes:  Pupils are equal, round, and reactive to light. Conjunctivae and EOM are normal. No scleral icterus.   Neck: Normal range of motion. Neck supple. No JVD present. No tracheal deviation present. No thyromegaly present.   Cardiovascular: Normal rate, regular rhythm, S1 normal and S2 normal. Exam reveals no gallop and no friction rub.   No murmur heard.  R rad access site c/d/i.   Pulmonary/Chest: Effort normal and breath sounds normal. No respiratory distress. She has no wheezes. She has no rales. She exhibits no tenderness.   Abdominal: Soft. She exhibits no distension.   Musculoskeletal: Normal range of motion. She exhibits no edema.   Neurological: She is alert and oriented to person, place, and time. No cranial nerve deficit.   Skin: Skin is warm and dry. She is not diaphoretic.   Psychiatric: She has a normal mood and affect. Her behavior is normal. Judgment normal.       Current Medications:   aspirin  325 mg Oral Daily    cloNIDine  0.2 mg Oral BID    clopidogrel  75 mg Oral Daily    [START ON 6/12/2019] epoetin huy-ebpx (RETACRIT) injection  10,000 Units Intravenous Every Mon, Wed, Fri    insulin detemir U-100  5 Units Subcutaneous BID    metoclopramide HCl  5 mg Oral QID    mupirocin   Nasal BID    pantoprazole  40 mg Oral Daily    rosuvastatin  5 mg Oral Daily    sevelamer carbonate  1,600 mg Oral TID WM    vitamin renal formula (B-complex-vitamin c-folic acid)  1 capsule Oral Daily      nitroGLYCERIN       sodium chloride 0.9%, acetaminophen, dextrose 50%, dextrose 50%, glucagon (human recombinant), glucose, glucose, hydrALAZINE, HYDROcodone-acetaminophen, HYDROmorphone, insulin aspart U-100, labetalol, morphine, nitroGLYCERIN, ondansetron, ondansetron, polyethylene glycol, prochlorperazine, promethazine (PHENERGAN) IVPB, senna-docusate 8.6-50 mg, sodium chloride 0.9%    Laboratory (all labs reviewed):  CBC:  Recent Labs   Lab 03/14/19  0640 03/16/19  0922 06/09/19  0900 06/10/19  0624  06/11/19  0655   WHITE BLOOD CELL COUNT 8.84 9.54 10.72 13.61 H 8.51   HEMOGLOBIN 13.3 13.7 10.1 L 11.0 L 9.5 L   HEMATOCRIT 41.6 41.6 31.6 L 34.2 L 29.4 L   PLATELETS 210 232 259 310 265       CHEMISTRIES:  Recent Labs   Lab 01/12/18  0507  03/14/19  0640 03/16/19  0922 06/09/19  0900 06/10/19  0624 06/11/19  0655   GLUCOSE 110   < > 119 H 152 H 315 H 308 H 133 H   SODIUM 141   < > 133 L 135 L 138 133 L 137   POTASSIUM 4.2   < > 4.0 5.0 6.2 H 5.3 H 4.2   BUN BLD 24 H   < > 35 H 47 H 65 H 33 H 17   CREATININE 9.23 H   < > 8.78 H 9.30 H 12.7 H 8.7 H 5.1 H   EGFR IF  6 A   < > 6 A 5 A 4 A 6 A 11 A   EGFR IF NON- 5 A   < > 5 A 5 A 3 A 5 A 10 A   CALCIUM 9.6   < > 10.3 H 10.0 9.4 11.2 H 9.2   MAGNESIUM 2.2  --   --   --  2.0 1.9 1.7    < > = values in this interval not displayed.       CARDIAC BIOMARKERS:  Recent Labs   Lab 01/16/19  1200  06/09/19  0900 06/09/19  1532 06/09/19  2053 06/10/19  0624 06/10/19  1137     --   --   --   --   --   --    TROPONIN I 3.620 HH   < > 0.394 H 1.038 H 1.569 H 1.178 H 0.804 H    < > = values in this interval not displayed.       COAGS:  Recent Labs   Lab 01/16/19  1620 01/19/19  0750 03/11/19  1648 06/09/19  0900 06/09/19  2053   INR 1.4 1.0 1.0 1.0 1.0       LIPIDS/LFTS:  Recent Labs   Lab 07/05/16  0732 12/11/17  0454  03/13/19  0656 03/16/19  0922 06/09/19  0900 06/10/19  0624 06/11/19  0655   CHOLESTEROL 221 H 176  --   --   --   --   --   --    TRIGLYCERIDES 449 H 204 H  --   --   --   --   --   --    HDL 35 L 39 L  --   --   --   --   --   --    LDL CHOLESTEROL Invalid, Trig>400.0 96.2  --   --   --   --   --   --    NON-HDL CHOLESTEROL 186 137  --   --   --   --   --   --    AST 19  --    < > 26 43 H 19 24 21   ALT 15  --    < > 43 22 20 24 16    < > = values in this interval not displayed.       BNP:  Recent Labs   Lab 06/09/19  0900   BNP 2,443 H       TSH:  Recent Labs   Lab 12/10/17  2300 01/10/18  1627 03/01/18  1034   TSH 2.260  2.430 1.722       Free T4:  Recent Labs   Lab 01/10/18  1627   FREE T4 1.09       Diagnostic Results:  Echo: 6/10/19 (images personally reviewed and interpreted)  · Moderately decreased left ventricular systolic function. The estimated ejection fraction is 35%  · Mild-moderate global hypokinetic wall motion.  · Mild concentric left ventricular hypertrophy.  · Grade III (severe) left ventricular diastolic dysfunction consistent with restrictive physiology.  · Normal right ventricular systolic function.  · Mild mitral regurgitation.  · Mild tricuspid regurgitation.  · The estimated PA systolic pressure is 59 mm Hg  · Pulmonary hypertension present.    Cath: 6/10/19 (Dr. Guzman)  Description of the findings of the procedure:   1. PCI of mid to distal RCA with drug-eluting stent x1  2. PCI of OM1 with drug-eluting stent x1  3. PCI of mid circ with drug-eluting stent x1  Findings/Key Components:  LVEDP: 12 mmHg  LM:  No significant stenosis  LAD:  Proximal 20% stenosis  LCx:  Mid circ has a severe 70-80 % eccentric stenosis right after the takeoff of 1st OM.  IFR was performed across it and was found to be 0.59.  Successful PCI with drug-eluting stent x 1.  Post dilated at high atmospheric pressures with excellent angiographic results and BRITTANY 3 flow post PCI  OM1 has a 70% stenosis IFR was performed across it and was found to be 0.83.  PCI was performed with drug-eluting stent x1.  Post dilated at high atmospheric pressures with excellent angiographic results and BRITTANY 3 flow post PCI  RCA:  Previously placed RCA stents are patent.  Distal to the RCA stent there is a long 70% stenosis.  IFR was performed across it and it was found to be 0.86.  PCI of RCA performed with drug-eluting stent x1.  Post dilated at high atmospheric pressures with excellent angiographic results and BRITTANY 3 flow post PCI  Hemostasis:  R Radial band  Impression:  Hemodynamically significant stenosis of RCA, mid circ and OM1.  PCI of RCA, mid circ and  OM1 with drug-eluting stents  Plan:  Aspirin 81 mg daily indefinitely  Plavix 75 mg daily for at least 1 year uninterrupted.  Longer if no contraindications.  Aggressive risk factor modification  Follow-up in Cardiology Clinic in 1 week      Assessment and Plan:     * Acute respiratory failure with hypoxia  Per IM, resolved  Pt appears euvolemic    Unstable angina pectoris  NSTEMI  S/p MV PCI 6/10/19 (LCx/OM and RCA)  EF 35% by echo  Cont med rx:  ASA 81mg qd indef  Plavix 75mg qd for 1 year (thru 6/2020)  Start toprol 25mg qd  Stop clonidine  Dispo planning appropriate  Pt to follow up with her cardiologist in [place]  Importance of compliance with med rx, yonathan DAPT stressed to pt and family in attendance.      CAD (coronary artery disease)  As above    ESRD (end stage renal disease)  Per neph  Vol mgmt with HD  Appears euvolemic    Type 2 diabetes mellitus, with long-term current use of insulin  Per primary team        VTE Risk Mitigation (From admission, onward)        Ordered     IP VTE HIGH RISK PATIENT  Once      06/09/19 1455     Place sequential compression device  Until discontinued      06/09/19 1455        Dispo planning appropriate  Pt to follow up with primary cardiologist in [place]  Case d/w RN    Miles Quiroz MD  Cardiology  [facility]

## 2019-06-11 NOTE — NURSING
"1118 -  came out of the room and told me that the patient "fainted". Patient was standing at the sink brushing her teeth, when she "all of sudden did not feel well, and felt dizzy and lightheaded. The  attempted to walk patient back to bed but her eyes rolled in the back of her head. The  caught the patient and put the patient back in the bed. Then proceeded to notify me. Patient was AAO in the bed. Randall walked into the room and orthostatic vital signs were done on the patient. BP lying down 114/59, BP while sitting on the edge of the bed 104/53 , and BP standing 81/45, .     1122 - Dr. Batres called and notified of the situation and results of BP. IV NS bolus was ordered over the phone. Bolus attempted to be started but IV access was no longer working.     1440 - IV access obtainable by Sheela (House Supervisor) via vein finder. NS 250ml bolus started at 1445.   "

## 2019-06-12 VITALS
BODY MASS INDEX: 33.79 KG/M2 | DIASTOLIC BLOOD PRESSURE: 63 MMHG | RESPIRATION RATE: 19 BRPM | OXYGEN SATURATION: 97 % | TEMPERATURE: 99 F | HEIGHT: 61 IN | WEIGHT: 179 LBS | HEART RATE: 97 BPM | SYSTOLIC BLOOD PRESSURE: 143 MMHG

## 2019-06-12 LAB
ALBUMIN SERPL BCP-MCNC: 3.2 G/DL (ref 3.5–5.2)
ALP SERPL-CCNC: 77 U/L (ref 55–135)
ALT SERPL W/O P-5'-P-CCNC: 16 U/L (ref 10–44)
ANION GAP SERPL CALC-SCNC: 11 MMOL/L (ref 8–16)
AST SERPL-CCNC: 16 U/L (ref 10–40)
BASOPHILS # BLD AUTO: 0.04 K/UL (ref 0–0.2)
BASOPHILS NFR BLD: 0.5 % (ref 0–1.9)
BILIRUB SERPL-MCNC: 0.6 MG/DL (ref 0.1–1)
BUN SERPL-MCNC: 35 MG/DL (ref 6–20)
CALCIUM SERPL-MCNC: 9.4 MG/DL (ref 8.7–10.5)
CHLORIDE SERPL-SCNC: 97 MMOL/L (ref 95–110)
CO2 SERPL-SCNC: 29 MMOL/L (ref 23–29)
CREAT SERPL-MCNC: 8.1 MG/DL (ref 0.5–1.4)
DIFFERENTIAL METHOD: ABNORMAL
EOSINOPHIL # BLD AUTO: 0.3 K/UL (ref 0–0.5)
EOSINOPHIL NFR BLD: 3.5 % (ref 0–8)
ERYTHROCYTE [DISTWIDTH] IN BLOOD BY AUTOMATED COUNT: 14.3 % (ref 11.5–14.5)
EST. GFR  (AFRICAN AMERICAN): 6 ML/MIN/1.73 M^2
EST. GFR  (NON AFRICAN AMERICAN): 6 ML/MIN/1.73 M^2
GLUCOSE SERPL-MCNC: 110 MG/DL (ref 70–110)
HCT VFR BLD AUTO: 28.5 % (ref 37–48.5)
HGB BLD-MCNC: 9.2 G/DL (ref 12–16)
LYMPHOCYTES # BLD AUTO: 2.1 K/UL (ref 1–4.8)
LYMPHOCYTES NFR BLD: 23.4 % (ref 18–48)
MAGNESIUM SERPL-MCNC: 1.9 MG/DL (ref 1.6–2.6)
MCH RBC QN AUTO: 29.3 PG (ref 27–31)
MCHC RBC AUTO-ENTMCNC: 32.3 G/DL (ref 32–36)
MCV RBC AUTO: 91 FL (ref 82–98)
MONOCYTES # BLD AUTO: 1.1 K/UL (ref 0.3–1)
MONOCYTES NFR BLD: 12.4 % (ref 4–15)
NEUTROPHILS # BLD AUTO: 5.3 K/UL (ref 1.8–7.7)
NEUTROPHILS NFR BLD: 60.2 % (ref 38–73)
PLATELET # BLD AUTO: 259 K/UL (ref 150–350)
PMV BLD AUTO: 10 FL (ref 9.2–12.9)
POCT GLUCOSE: 116 MG/DL (ref 70–110)
POCT GLUCOSE: 138 MG/DL (ref 70–110)
POCT GLUCOSE: 235 MG/DL (ref 70–110)
POCT GLUCOSE: 252 MG/DL (ref 70–110)
POTASSIUM SERPL-SCNC: 4.6 MMOL/L (ref 3.5–5.1)
PROT SERPL-MCNC: 6.6 G/DL (ref 6–8.4)
RBC # BLD AUTO: 3.14 M/UL (ref 4–5.4)
SODIUM SERPL-SCNC: 137 MMOL/L (ref 136–145)
WBC # BLD AUTO: 8.84 K/UL (ref 3.9–12.7)

## 2019-06-12 PROCEDURE — 99232 PR SUBSEQUENT HOSPITAL CARE,LEVL II: ICD-10-PCS | Mod: NTX,,, | Performed by: INTERNAL MEDICINE

## 2019-06-12 PROCEDURE — 63600175 PHARM REV CODE 636 W HCPCS: Mod: NTX | Performed by: INTERNAL MEDICINE

## 2019-06-12 PROCEDURE — 25000003 PHARM REV CODE 250: Mod: NTX | Performed by: INTERNAL MEDICINE

## 2019-06-12 PROCEDURE — 85025 COMPLETE CBC W/AUTO DIFF WBC: CPT | Mod: NTX

## 2019-06-12 PROCEDURE — 36415 COLL VENOUS BLD VENIPUNCTURE: CPT | Mod: NTX

## 2019-06-12 PROCEDURE — 83735 ASSAY OF MAGNESIUM: CPT | Mod: NTX

## 2019-06-12 PROCEDURE — 99232 SBSQ HOSP IP/OBS MODERATE 35: CPT | Mod: NTX,,, | Performed by: INTERNAL MEDICINE

## 2019-06-12 PROCEDURE — 25000003 PHARM REV CODE 250: Mod: NTX | Performed by: EMERGENCY MEDICINE

## 2019-06-12 PROCEDURE — 80053 COMPREHEN METABOLIC PANEL: CPT | Mod: NTX

## 2019-06-12 PROCEDURE — 80100016 HC MAINTENANCE HEMODIALYSIS: Mod: NTX

## 2019-06-12 RX ORDER — METOPROLOL SUCCINATE 50 MG/1
50 TABLET, EXTENDED RELEASE ORAL DAILY
Status: DISCONTINUED | OUTPATIENT
Start: 2019-06-12 | End: 2019-06-12 | Stop reason: HOSPADM

## 2019-06-12 RX ORDER — METOPROLOL SUCCINATE 50 MG/1
50 TABLET, EXTENDED RELEASE ORAL DAILY
Qty: 30 TABLET | Refills: 11 | Status: SHIPPED | OUTPATIENT
Start: 2019-06-12 | End: 2019-11-08 | Stop reason: SDUPTHER

## 2019-06-12 RX ORDER — ROSUVASTATIN CALCIUM 20 MG/1
20 TABLET, COATED ORAL DAILY
Qty: 30 TABLET | Refills: 11 | Status: ON HOLD | OUTPATIENT
Start: 2019-06-12 | End: 2019-11-22 | Stop reason: HOSPADM

## 2019-06-12 RX ORDER — SEVELAMER CARBONATE 800 MG/1
1600 TABLET, FILM COATED ORAL
Qty: 180 TABLET | Refills: 1 | Status: SHIPPED | OUTPATIENT
Start: 2019-06-12 | End: 2019-11-01

## 2019-06-12 RX ADMIN — EPOETIN ALFA-EPBX 10000 UNITS: 10000 INJECTION, SOLUTION INTRAVENOUS; SUBCUTANEOUS at 02:06

## 2019-06-12 RX ADMIN — ROSUVASTATIN CALCIUM 5 MG: 5 TABLET, FILM COATED ORAL at 08:06

## 2019-06-12 RX ADMIN — CLOPIDOGREL BISULFATE 75 MG: 75 TABLET ORAL at 08:06

## 2019-06-12 RX ADMIN — PANTOPRAZOLE SODIUM 40 MG: 40 TABLET, DELAYED RELEASE ORAL at 08:06

## 2019-06-12 RX ADMIN — METOPROLOL SUCCINATE 50 MG: 25 TABLET, EXTENDED RELEASE ORAL at 08:06

## 2019-06-12 RX ADMIN — ASPIRIN 81 MG: 81 TABLET, COATED ORAL at 08:06

## 2019-06-12 RX ADMIN — SEVELAMER CARBONATE 1600 MG: 800 TABLET, FILM COATED ORAL at 03:06

## 2019-06-12 RX ADMIN — SEVELAMER CARBONATE 1600 MG: 800 TABLET, FILM COATED ORAL at 08:06

## 2019-06-12 RX ADMIN — METOCLOPRAMIDE HYDROCHLORIDE 5 MG: 5 SOLUTION ORAL at 08:06

## 2019-06-12 RX ADMIN — METOCLOPRAMIDE HYDROCHLORIDE 5 MG: 5 SOLUTION ORAL at 03:06

## 2019-06-12 RX ADMIN — MUPIROCIN: 20 OINTMENT TOPICAL at 08:06

## 2019-06-12 RX ADMIN — NEPHROCAP 1 CAPSULE: 1 CAP ORAL at 08:06

## 2019-06-12 NOTE — NURSING
Patient returned from dialysis, received report from dialysis nurse, patient tolerated dialysis well, 3.5 hour duration. 1.2 L removed. Will monitor.

## 2019-06-12 NOTE — NURSING
Reviewed all discharge instructions with patient, new medications, continued medications, follow-up appointments and signs/symptoms to report to PCP or seek emergency medical care. Patient verbalized understanding to all instructions and education. Patient denies any complaints or concerns at this time. Patient was wheeled off unit to car for discharge.

## 2019-06-12 NOTE — SUBJECTIVE & OBJECTIVE
Review of Systems   Gastrointestinal: Negative for melena.   Genitourinary: Negative for hematuria.     Objective:     Vital Signs (Most Recent):  Temp: 98.2 °F (36.8 °C) (06/12/19 0726)  Pulse: 87 (06/12/19 0726)  Resp: 19 (06/12/19 0726)  BP: 135/69 (06/12/19 0726)  SpO2: 97 % (06/12/19 0726) Vital Signs (24h Range):  Temp:  [97.4 °F (36.3 °C)-99 °F (37.2 °C)] 98.2 °F (36.8 °C)  Pulse:  [] 87  Resp:  [18-19] 19  SpO2:  [93 %-97 %] 97 %  BP: ()/(45-69) 135/69     Weight: 81.2 kg (179 lb 0.2 oz)  Body mass index is 33.82 kg/m².     SpO2: 97 %  O2 Device (Oxygen Therapy): room air      Intake/Output Summary (Last 24 hours) at 6/12/2019 0729  Last data filed at 6/11/2019 1724  Gross per 24 hour   Intake 120 ml   Output --   Net 120 ml       Lines/Drains/Airways     Drain                 Hemodialysis AV Fistula 10/13/18 2350 Left upper arm 241 days          Airway                 Airway - Non-Surgical 03/14/19 0815 Nasal Cannula 89 days          Peripheral Intravenous Line                 Peripheral IV - Single Lumen 06/11/19 1428 20 G Anterior;Right Forearm less than 1 day              Exam unchanged vs 6/11/19  Physical Exam   Constitutional: She is oriented to person, place, and time. She appears well-developed and well-nourished. No distress.   HENT:   Head: Normocephalic and atraumatic.   Mouth/Throat: No oropharyngeal exudate.   Eyes: Pupils are equal, round, and reactive to light. Conjunctivae and EOM are normal. No scleral icterus.   Neck: Normal range of motion. Neck supple. No JVD present. No tracheal deviation present. No thyromegaly present.   Cardiovascular: Normal rate, regular rhythm, S1 normal and S2 normal. Exam reveals no gallop and no friction rub.   No murmur heard.  R rad access site c/d/i.   Pulmonary/Chest: Effort normal and breath sounds normal. No respiratory distress. She has no wheezes. She has no rales. She exhibits no tenderness.   Abdominal: Soft. She exhibits no distension.    Musculoskeletal: Normal range of motion. She exhibits no edema.   Neurological: She is alert and oriented to person, place, and time. No cranial nerve deficit.   Skin: Skin is warm and dry. She is not diaphoretic.   Psychiatric: She has a normal mood and affect. Her behavior is normal. Judgment normal.       Current Medications:   aspirin  81 mg Oral Daily    clopidogrel  75 mg Oral Daily    epoetin huy-ebpx (RETACRIT) injection  10,000 Units Intravenous Every Mon, Wed, Fri    insulin detemir U-100  7 Units Subcutaneous BID    metoclopramide HCl  5 mg Oral QID    metoprolol succinate  25 mg Oral Daily    mupirocin   Nasal BID    pantoprazole  40 mg Oral Daily    rosuvastatin  5 mg Oral Daily    sevelamer carbonate  1,600 mg Oral TID WM    vitamin renal formula (B-complex-vitamin c-folic acid)  1 capsule Oral Daily      nitroGLYCERIN       sodium chloride 0.9%, acetaminophen, dextrose 50%, dextrose 50%, glucagon (human recombinant), glucose, glucose, hydrALAZINE, HYDROcodone-acetaminophen, HYDROmorphone, insulin aspart U-100, labetalol, morphine, nitroGLYCERIN, ondansetron, ondansetron, polyethylene glycol, prochlorperazine, promethazine (PHENERGAN) IVPB, senna-docusate 8.6-50 mg, sodium chloride 0.9%    Laboratory (all labs reviewed):  CBC:  Recent Labs   Lab 03/16/19 0922 06/09/19  0900 06/10/19  0624 06/11/19  0655 06/12/19  0602   WHITE BLOOD CELL COUNT 9.54 10.72 13.61 H 8.51 8.84   HEMOGLOBIN 13.7 10.1 L 11.0 L 9.5 L 9.2 L   HEMATOCRIT 41.6 31.6 L 34.2 L 29.4 L 28.5 L   PLATELETS 232 259 310 265 259       CHEMISTRIES:  Recent Labs   Lab 01/12/18  0507  03/14/19  0640 03/16/19 0922 06/09/19  0900 06/10/19  0624 06/11/19  0655   GLUCOSE 110   < > 119 H 152 H 315 H 308 H 133 H   SODIUM 141   < > 133 L 135 L 138 133 L 137   POTASSIUM 4.2   < > 4.0 5.0 6.2 H 5.3 H 4.2   BUN BLD 24 H   < > 35 H 47 H 65 H 33 H 17   CREATININE 9.23 H   < > 8.78 H 9.30 H 12.7 H 8.7 H 5.1 H   EGFR IF  6 A    < > 6 A 5 A 4 A 6 A 11 A   EGFR IF NON- 5 A   < > 5 A 5 A 3 A 5 A 10 A   CALCIUM 9.6   < > 10.3 H 10.0 9.4 11.2 H 9.2   MAGNESIUM 2.2  --   --   --  2.0 1.9 1.7    < > = values in this interval not displayed.       CARDIAC BIOMARKERS:  Recent Labs   Lab 01/16/19  1200  06/09/19  0900 06/09/19  1532 06/09/19 2053 06/10/19  0624 06/10/19  1137     --   --   --   --   --   --    TROPONIN I 3.620 HH   < > 0.394 H 1.038 H 1.569 H 1.178 H 0.804 H    < > = values in this interval not displayed.       COAGS:  Recent Labs   Lab 01/16/19  1620 01/19/19  0750 03/11/19  1648 06/09/19  0900 06/09/19  2053   INR 1.4 1.0 1.0 1.0 1.0       LIPIDS/LFTS:  Recent Labs   Lab 07/05/16  0732 12/11/17  0454  03/13/19  0656 03/16/19  0922 06/09/19  0900 06/10/19  0624 06/11/19  0655   CHOLESTEROL 221 H 176  --   --   --   --   --   --    TRIGLYCERIDES 449 H 204 H  --   --   --   --   --   --    HDL 35 L 39 L  --   --   --   --   --   --    LDL CHOLESTEROL Invalid, Trig>400.0 96.2  --   --   --   --   --   --    NON-HDL CHOLESTEROL 186 137  --   --   --   --   --   --    AST 19  --    < > 26 43 H 19 24 21   ALT 15  --    < > 43 22 20 24 16    < > = values in this interval not displayed.       BNP:  Recent Labs   Lab 06/09/19  0900   BNP 2,443 H       TSH:  Recent Labs   Lab 12/10/17  2300 01/10/18  1627 03/01/18  1034   TSH 2.260 2.430 1.722       Free T4:  Recent Labs   Lab 01/10/18  1627   FREE T4 1.09       Diagnostic Results:  Echo: 6/10/19 (images personally reviewed and interpreted)  · Moderately decreased left ventricular systolic function. The estimated ejection fraction is 35%  · Mild-moderate global hypokinetic wall motion.  · Mild concentric left ventricular hypertrophy.  · Grade III (severe) left ventricular diastolic dysfunction consistent with restrictive physiology.  · Normal right ventricular systolic function.  · Mild mitral regurgitation.  · Mild tricuspid regurgitation.  · The estimated PA  systolic pressure is 59 mm Hg  · Pulmonary hypertension present.    Cath: 6/10/19 (Dr. Guzman)  Description of the findings of the procedure:   1. PCI of mid to distal RCA with drug-eluting stent x1  2. PCI of OM1 with drug-eluting stent x1  3. PCI of mid circ with drug-eluting stent x1  Findings/Key Components:  LVEDP: 12 mmHg  LM:  No significant stenosis  LAD:  Proximal 20% stenosis  LCx:  Mid circ has a severe 70-80 % eccentric stenosis right after the takeoff of 1st OM.  IFR was performed across it and was found to be 0.59.  Successful PCI with drug-eluting stent x 1.  Post dilated at high atmospheric pressures with excellent angiographic results and BRITTANY 3 flow post PCI  OM1 has a 70% stenosis IFR was performed across it and was found to be 0.83.  PCI was performed with drug-eluting stent x1.  Post dilated at high atmospheric pressures with excellent angiographic results and BRITTANY 3 flow post PCI  RCA:  Previously placed RCA stents are patent.  Distal to the RCA stent there is a long 70% stenosis.  IFR was performed across it and it was found to be 0.86.  PCI of RCA performed with drug-eluting stent x1.  Post dilated at high atmospheric pressures with excellent angiographic results and BRITTANY 3 flow post PCI  Hemostasis:  R Radial band  Impression:  Hemodynamically significant stenosis of RCA, mid circ and OM1.  PCI of RCA, mid circ and OM1 with drug-eluting stents  Plan:  Aspirin 81 mg daily indefinitely  Plavix 75 mg daily for at least 1 year uninterrupted.  Longer if no contraindications.  Aggressive risk factor modification  Follow-up in Cardiology Clinic in 1 week

## 2019-06-12 NOTE — PLAN OF CARE
Problem: Adult Inpatient Plan of Care  Goal: Plan of Care Review  Outcome: Ongoing (interventions implemented as appropriate)     06/11/19 1948   Plan of Care Review   Plan of Care Reviewed With patient   Progress improving       Problem: Hypertension Acute  Goal: Blood Pressure Within Desired Range    Intervention: Normalize Blood Pressure  BP medications held this AM and BP medications were reviewed, changed, and discontinued. Bolus 250 NS was given at a rate of 100ml/hr and BP increased from SBP 80s-90s to 134 at 1600 vitals.

## 2019-06-12 NOTE — PROGRESS NOTES
HECTOR contacted Virgilio, Home Therapy Nurse at [facility] to discuss patients discharge. HECTOR informed that patient would be discharging today and was given Epogen in the hospital. Virgilio informed HECTOR that they give their patients a similar medication and inquired why. HECTOR informed Virgilio that she could send  Nephrology notes for continuity of care. Virgilio stated that she could fax the notes.

## 2019-06-12 NOTE — NURSING
Patient bedside report has been completed and given to AI Perales. Chart check has been completed. NAD noted. Patient's SBP has been 80s-90s. Syncopal episode with ambulation but no fall. Positive orthostatic hypotension. Patient instructed to call if wanting to get out of bed. Bed alarm placed. Bolus 250 NS running at 100ml/hr BP increased .

## 2019-06-12 NOTE — PLAN OF CARE
06/12/19 1308   Medicare Message   Important Message from Medicare regarding Discharge Appeal Rights Given to patient/caregiver   Date IMM was signed 06/12/19   Time IMM was signed 130

## 2019-06-12 NOTE — PROGRESS NOTES
[facility]  Cardiology  Progress Note    Patient Name: Gilda Schroeder  MRN: [id number]  Admission Date: 6/9/2019  Hospital Length of Stay: 3 days  Code Status: Full Code   Attending Physician: Elroy Batres MD   Primary Care Physician: Roselia Rose MD  Expected Discharge Date:   Principal Problem:Acute respiratory failure with hypoxia    Subjective:     Hospital Course:   6/10/19: PCI x3 FIDE (LCx/OM and RCA)    Interval Hx: no CP/sob/abd pain.  No palps/LH.  Asking when they will be discharged.    Tele: SR 80s (personally reviewed and interpreted)        Review of Systems   Gastrointestinal: Negative for melena.   Genitourinary: Negative for hematuria.     Objective:     Vital Signs (Most Recent):  Temp: 98.2 °F (36.8 °C) (06/12/19 0726)  Pulse: 87 (06/12/19 0726)  Resp: 19 (06/12/19 0726)  BP: 135/69 (06/12/19 0726)  SpO2: 97 % (06/12/19 0726) Vital Signs (24h Range):  Temp:  [97.4 °F (36.3 °C)-99 °F (37.2 °C)] 98.2 °F (36.8 °C)  Pulse:  [] 87  Resp:  [18-19] 19  SpO2:  [93 %-97 %] 97 %  BP: ()/(45-69) 135/69     Weight: 81.2 kg (179 lb 0.2 oz)  Body mass index is 33.82 kg/m².     SpO2: 97 %  O2 Device (Oxygen Therapy): room air      Intake/Output Summary (Last 24 hours) at 6/12/2019 0729  Last data filed at 6/11/2019 1724  Gross per 24 hour   Intake 120 ml   Output --   Net 120 ml       Lines/Drains/Airways     Drain                 Hemodialysis AV Fistula 10/13/18 2350 Left upper arm 241 days          Airway                 Airway - Non-Surgical 03/14/19 0815 Nasal Cannula 89 days          Peripheral Intravenous Line                 Peripheral IV - Single Lumen 06/11/19 1428 20 G Anterior;Right Forearm less than 1 day              Exam unchanged vs 6/11/19  Physical Exam   Constitutional: She is oriented to person, place, and time. She appears well-developed and well-nourished. No distress.   HENT:   Head: Normocephalic and atraumatic.   Mouth/Throat: No  oropharyngeal exudate.   Eyes: Pupils are equal, round, and reactive to light. Conjunctivae and EOM are normal. No scleral icterus.   Neck: Normal range of motion. Neck supple. No JVD present. No tracheal deviation present. No thyromegaly present.   Cardiovascular: Normal rate, regular rhythm, S1 normal and S2 normal. Exam reveals no gallop and no friction rub.   No murmur heard.  R rad access site c/d/i.   Pulmonary/Chest: Effort normal and breath sounds normal. No respiratory distress. She has no wheezes. She has no rales. She exhibits no tenderness.   Abdominal: Soft. She exhibits no distension.   Musculoskeletal: Normal range of motion. She exhibits no edema.   Neurological: She is alert and oriented to person, place, and time. No cranial nerve deficit.   Skin: Skin is warm and dry. She is not diaphoretic.   Psychiatric: She has a normal mood and affect. Her behavior is normal. Judgment normal.       Current Medications:   aspirin  81 mg Oral Daily    clopidogrel  75 mg Oral Daily    epoetin huy-ebpx (RETACRIT) injection  10,000 Units Intravenous Every Mon, Wed, Fri    insulin detemir U-100  7 Units Subcutaneous BID    metoclopramide HCl  5 mg Oral QID    metoprolol succinate  25 mg Oral Daily    mupirocin   Nasal BID    pantoprazole  40 mg Oral Daily    rosuvastatin  5 mg Oral Daily    sevelamer carbonate  1,600 mg Oral TID WM    vitamin renal formula (B-complex-vitamin c-folic acid)  1 capsule Oral Daily      nitroGLYCERIN       sodium chloride 0.9%, acetaminophen, dextrose 50%, dextrose 50%, glucagon (human recombinant), glucose, glucose, hydrALAZINE, HYDROcodone-acetaminophen, HYDROmorphone, insulin aspart U-100, labetalol, morphine, nitroGLYCERIN, ondansetron, ondansetron, polyethylene glycol, prochlorperazine, promethazine (PHENERGAN) IVPB, senna-docusate 8.6-50 mg, sodium chloride 0.9%    Laboratory (all labs reviewed):  CBC:  Recent Labs   Lab 03/16/19  0922 06/09/19  0900 06/10/19  0624  06/11/19  0655 06/12/19  0602   WHITE BLOOD CELL COUNT 9.54 10.72 13.61 H 8.51 8.84   HEMOGLOBIN 13.7 10.1 L 11.0 L 9.5 L 9.2 L   HEMATOCRIT 41.6 31.6 L 34.2 L 29.4 L 28.5 L   PLATELETS 232 259 310 265 259       CHEMISTRIES:  Recent Labs   Lab 01/12/18  0507  03/14/19  0640 03/16/19  0922 06/09/19  0900 06/10/19  0624 06/11/19  0655   GLUCOSE 110   < > 119 H 152 H 315 H 308 H 133 H   SODIUM 141   < > 133 L 135 L 138 133 L 137   POTASSIUM 4.2   < > 4.0 5.0 6.2 H 5.3 H 4.2   BUN BLD 24 H   < > 35 H 47 H 65 H 33 H 17   CREATININE 9.23 H   < > 8.78 H 9.30 H 12.7 H 8.7 H 5.1 H   EGFR IF  6 A   < > 6 A 5 A 4 A 6 A 11 A   EGFR IF NON- 5 A   < > 5 A 5 A 3 A 5 A 10 A   CALCIUM 9.6   < > 10.3 H 10.0 9.4 11.2 H 9.2   MAGNESIUM 2.2  --   --   --  2.0 1.9 1.7    < > = values in this interval not displayed.       CARDIAC BIOMARKERS:  Recent Labs   Lab 01/16/19  1200  06/09/19  0900 06/09/19  1532 06/09/19 2053 06/10/19  0624 06/10/19  1137     --   --   --   --   --   --    TROPONIN I 3.620 HH   < > 0.394 H 1.038 H 1.569 H 1.178 H 0.804 H    < > = values in this interval not displayed.       COAGS:  Recent Labs   Lab 01/16/19  1620 01/19/19  0750 03/11/19  1648 06/09/19  0900 06/09/19  2053   INR 1.4 1.0 1.0 1.0 1.0       LIPIDS/LFTS:  Recent Labs   Lab 07/05/16  0732 12/11/17  0454  03/13/19  0656 03/16/19 0922 06/09/19  0900 06/10/19  0624 06/11/19  0655   CHOLESTEROL 221 H 176  --   --   --   --   --   --    TRIGLYCERIDES 449 H 204 H  --   --   --   --   --   --    HDL 35 L 39 L  --   --   --   --   --   --    LDL CHOLESTEROL Invalid, Trig>400.0 96.2  --   --   --   --   --   --    NON-HDL CHOLESTEROL 186 137  --   --   --   --   --   --    AST 19  --    < > 26 43 H 19 24 21   ALT 15  --    < > 43 22 20 24 16    < > = values in this interval not displayed.       BNP:  Recent Labs   Lab 06/09/19  0900   BNP 2,443 H       TSH:  Recent Labs   Lab 12/10/17  2300 01/10/18  1627  03/01/18  1034   TSH 2.260 2.430 1.722       Free T4:  Recent Labs   Lab 01/10/18  1627   FREE T4 1.09       Diagnostic Results:  Echo: 6/10/19 (images personally reviewed and interpreted)  · Moderately decreased left ventricular systolic function. The estimated ejection fraction is 35%  · Mild-moderate global hypokinetic wall motion.  · Mild concentric left ventricular hypertrophy.  · Grade III (severe) left ventricular diastolic dysfunction consistent with restrictive physiology.  · Normal right ventricular systolic function.  · Mild mitral regurgitation.  · Mild tricuspid regurgitation.  · The estimated PA systolic pressure is 59 mm Hg  · Pulmonary hypertension present.    Cath: 6/10/19 (Dr. Guzman)  Description of the findings of the procedure:   1. PCI of mid to distal RCA with drug-eluting stent x1  2. PCI of OM1 with drug-eluting stent x1  3. PCI of mid circ with drug-eluting stent x1  Findings/Key Components:  LVEDP: 12 mmHg  LM:  No significant stenosis  LAD:  Proximal 20% stenosis  LCx:  Mid circ has a severe 70-80 % eccentric stenosis right after the takeoff of 1st OM.  IFR was performed across it and was found to be 0.59.  Successful PCI with drug-eluting stent x 1.  Post dilated at high atmospheric pressures with excellent angiographic results and BRITTANY 3 flow post PCI  OM1 has a 70% stenosis IFR was performed across it and was found to be 0.83.  PCI was performed with drug-eluting stent x1.  Post dilated at high atmospheric pressures with excellent angiographic results and BRITTANY 3 flow post PCI  RCA:  Previously placed RCA stents are patent.  Distal to the RCA stent there is a long 70% stenosis.  IFR was performed across it and it was found to be 0.86.  PCI of RCA performed with drug-eluting stent x1.  Post dilated at high atmospheric pressures with excellent angiographic results and BRITTANY 3 flow post PCI  Hemostasis:  R Radial band  Impression:  Hemodynamically significant stenosis of RCA, mid circ and  OM1.  PCI of RCA, mid circ and OM1 with drug-eluting stents  Plan:  Aspirin 81 mg daily indefinitely  Plavix 75 mg daily for at least 1 year uninterrupted.  Longer if no contraindications.  Aggressive risk factor modification  Follow-up in Cardiology Clinic in 1 week      Assessment and Plan:     * Acute respiratory failure with hypoxia  Per IM, resolved  Pt appears euvolemic    Unstable angina pectoris  NSTEMI  S/p MV PCI 6/10/19 (LCx/OM and RCA)  EF 35% by echo  Cont med rx:  ASA 81mg qd indef  Plavix 75mg qd for 1 year (thru 6/2020)  Cont toprol 25mg qd  Consider ACEi vs hydrala/imdur  Dispo planning appropriate  Pt to follow up with her cardiologist in [place]  Importance of compliance with med rx, yonathan DAPT stressed to pt and family in attendance.      CAD (coronary artery disease)  As above    ESRD (end stage renal disease)  Per neph  Vol mgmt with HD  Appears euvolemic    Type 2 diabetes mellitus, with long-term current use of insulin  Per primary team        VTE Risk Mitigation (From admission, onward)        Ordered     IP VTE HIGH RISK PATIENT  Once      06/09/19 1455     Place sequential compression device  Until discontinued      06/09/19 1455        Dispo planning appropriate  Cardiology will sign off  Pt to follow up with primary cardiologist in [place] MD  Cardiology  [facility]

## 2019-06-12 NOTE — PLAN OF CARE
Problem: Fall Injury Risk  Goal: Absence of Fall and Fall-Related Injury    Intervention: Promote Injury-Free Environment     06/12/19 0428   Optimize Balance and Safe Activity   Safety Promotion/Fall Prevention assistive device/personal item within reach;bed alarm set;family to remain at bedside;lighting adjusted;nonskid shoes/socks when out of bed;room near unit station;side rails raised x 2         Comments: Rested during night but call light was kept at side.  at side as well. Reminded to put non-skid on when getting out of bed. Verbalized understanding. Remains free from injury. Skin intact. SR on telemetry monitor. BP WNL.

## 2019-06-12 NOTE — PROGRESS NOTES
Renal Progress Note    Date of Admission:  6/9/2019  7:23 AM    Length of Stay: 3  Days    Subjective: no complaints    Objective:    Current Facility-Administered Medications   Medication    0.9%  NaCl infusion    acetaminophen tablet 650 mg    aspirin EC tablet 81 mg    clopidogrel tablet 75 mg    dextrose 50% injection 12.5 g    dextrose 50% injection 25 g    epoetin huy-epbx injection 10,000 Units    glucagon (human recombinant) injection 1 mg    glucose chewable tablet 16 g    glucose chewable tablet 24 g    hydrALAZINE injection 10 mg    HYDROcodone-acetaminophen 5-325 mg per tablet 1 tablet    hydromorphone (PF) injection 1 mg    insulin aspart U-100 pen 0-5 Units    insulin detemir U-100 pen 7 Units    labetalol injection 10 mg    metoclopramide HCl 5 mg/5 mL solution 5 mg    metoprolol succinate (TOPROL-XL) 24 hr tablet 50 mg    morphine injection 3 mg    mupirocin 2 % ointment    nitroGLYCERIN 50 mg in dextrose 5 % 250 mL infusion (NON-TITRATING)    ondansetron injection 4 mg    ondansetron injection 8 mg    pantoprazole EC tablet 40 mg    polyethylene glycol packet 17 g    prochlorperazine injection Soln 10 mg    promethazine (PHENERGAN) 25 mg in dextrose 5 % 50 mL IVPB    rosuvastatin tablet 5 mg    senna-docusate 8.6-50 mg per tablet 1 tablet    sevelamer carbonate tablet 1,600 mg    sodium chloride 0.9% flush 10 mL    vitamin renal formula (B-complex-vitamin c-folic acid) 1 mg per capsule 1 capsule       Vitals:    06/11/19 1957 06/11/19 2340 06/12/19 0428 06/12/19 0726   BP: (!) 114/58 130/68 (!) 134/59 135/69   BP Location: Right arm Right arm Right arm Right arm   Patient Position: Lying Lying Lying Lying   Pulse: 86 93 81 87   Resp: 18 18 18 19   Temp: 99 °F (37.2 °C) 98.6 °F (37 °C) 98 °F (36.7 °C) 98.2 °F (36.8 °C)   TempSrc: Oral Oral Oral Oral   SpO2: 95% 96% 96% 97%   Weight:   81.2 kg (179 lb 0.2 oz)    Height:           I/O last 3  completed shifts:  In: 720 [P.O.:120; Other:600]  Out: 1000 [Other:1000]  No intake/output data recorded.      Physical Exam:    NAD  Neck: supple  Heart: RRR   Lungs: Unlabored breathing  Abdomen: n/a  : n/a  Extremities:  n/a  Neurologic: Awake, alert, oriented x 3      Laboratories:    Recent Labs   Lab 06/12/19  0602   WBC 8.84   RBC 3.14*   HGB 9.2*   HCT 28.5*      MCV 91   MCH 29.3   MCHC 32.3       Recent Labs   Lab 06/12/19  0602   CALCIUM 9.4   PROT 6.6      K 4.6   CO2 29   CL 97   BUN 35*   CREATININE 8.1*   ALKPHOS 77   ALT 16   AST 16   BILITOT 0.6       No results for input(s): COLORU, CLARITYU, SPECGRAV, PHUR, PROTEINUA, GLUCOSEU, BLOODU, WBCU, RBCU, BACTERIA, MUCUS in the last 24 hours.    Invalid input(s):  BILIRUBINCON    Microbiology Results (last 7 days)     Procedure Component Value Units Date/Time    Blood culture #2 [[id number]] Collected:  06/09/19 0900    Order Status:  Completed Specimen:  Blood from Peripheral, Upper Arm, Right Updated:  06/11/19 1103     Blood Culture, Routine No Growth to date     Blood Culture, Routine No Growth to date     Blood Culture, Routine No Growth to date    Narrative:       Blood Culture #2    Blood culture #1 [[id number]] Collected:  06/09/19 0855    Order Status:  Completed Specimen:  Blood from Peripheral, Upper Arm, Right Updated:  06/11/19 1103     Blood Culture, Routine No Growth to date     Blood Culture, Routine No Growth to date     Blood Culture, Routine No Growth to date    Narrative:       Blood Culture #1            Diagnostic Tests:   X-Ray Chest AP Portable [[id number]] Resulted: 06/09/19 0750   Order Status: Completed      Impression:       Cardiomegaly with prominent central pulmonary vasculature and mildly accentuated interstitial markings suggestive of interstitial edema, similar to prior examination.      Electronically signed by: Rhett Barbosa MD  Date: 06/09/2019  Time: 07:50           Assessment:  45 y/o AAF with Hx. Of  ESRD on home Hemodialysis admitted with:    - s/p NSTEMI   - s/p LHC with PCI x 3   - s/p Hypertensive urgency  - s/p hypoxic respiratory failure due to pulmonary edema  - Hyperkalemia RESOLVED  - DM-2  - Hx. 2nd. Hyper-parathyroidism  - Anemia of CKD  - Hyperphosphatemia  - Hypercalcemia improved         Plan:      - Dialysis in progress  - Pte. To resume home Calcitriol  - Epogen w/ HD  - Sevelamer w/ each meal  - Discharge Home in progress  - Resume Home Dialysis as per her Nephrologist orders

## 2019-06-12 NOTE — PROGRESS NOTES
Received report from Fe CLOUD. Upon walking rounds, pt resting in bed with  at side. No c/o pain. Denies nausea or dizziness at this time. Right radial site closed, with small scab developed. Radial and pedal pulses +2, w/o numbness or tingling. Call light at side.

## 2019-06-12 NOTE — ASSESSMENT & PLAN NOTE
NSTEMI  S/p MV PCI 6/10/19 (LCx/OM and RCA)  EF 35% by echo  Cont med rx:  ASA 81mg qd indef  Plavix 75mg qd for 1 year (thru 6/2020)  Cont toprol 25mg qd  Consider ACEi vs hydrala/imdur  Dispo planning appropriate  Pt to follow up with her cardiologist in [place]  Importance of compliance with med rx, yonathan DAPT stressed to pt and family in attendance.

## 2019-06-12 NOTE — PLAN OF CARE
"EDUCATION:  SW provided patient with educational information on Acute Respiratory Failure.  Information was reviewed and placed in "My Healthcare Packet" to be brought home for use as a resource after discharge.  Information included: signs and symptoms to look for and call the doctor if experiencing, and symptoms that may indicate a medical emergency: CALL 911.  All questions answered.  Teach back method used. Patient stated that will look for symptoms: trouble catching her breath and wheezing. HECTOR gave patient follow up appointments for Cardiology and PCP. HECTOR spoke with nurse Clemens and informed her that patient was ready for discharge from  standpoint.        06/12/19 1310   Final Note   Assessment Type Final Discharge Note   Anticipated Discharge Disposition Home   Hospital Follow Up  Appt(s) scheduled? Yes   Discharge plans and expectations educations in teach back method with documentation complete? Yes     "

## 2019-06-12 NOTE — NURSING
Received report from ROB Kendrick. Patient lying in bed resting, Alert and oriented x 4. NAD noted, safety precautions maintained, will monitor.

## 2019-06-12 NOTE — PROGRESS NOTES
[facility] CASE MANAGEMENT                  WRITTEN DISCHARGE INFORMATION      APPOINTMENTS AND RESOURCES TO HELP YOU MANAGE YOUR CARE AT HOME BASED ON YOUR PREFERENCES:  (If an appointment is not scheduled for you when you leave the hospital, call your doctor to schedule a follow up visit within a week)        Healthy Living Instructions to HELP MANAGE YOUR CARE AT HOME:  Things You are responsible for:  1.    Getting your prescriptions filled   2.    Taking your medications as directed, DO NOT MISS ANY DOSES!  3.    Following the diet and exercise recommended by your doctor  4.    Going to your follow-up doctor appointment. This is important because it allows the doctor to monitor your progress and determine if any changes need to made to your treatment plan.  5. If you have any questions about MANAGING YOUR CARE AT HOME Call the Nurse Care Line for 24/7 Assistance [phone]       Please answer any calls you may receive from [facility]. We want to continue to support you as you manage your healthcare needs. [facility] is happy to have the opportunity to serve you.      Thank you for choosing [facility] for your healthcare needs!  Your [facility] Case Management Team,    Socorro King   II  Care Management  [phone]        Follow-up Information     TALAT Mcgrath. Go on 6/14/2019.    Specialty:  Family Medicine  Why:  Outpatient Services: Hospital F/U with NP at 10:00 am at [address] [address] [address]  Contact information:  [address] [address]  [address]  [facility] [address]  [phone]             Cici Tineo PA-C. Go on 6/19/2019.    Specialty:  Cardiology  Why:  Outpatient Services: Hospital F/ with Cardiologist at 2:00 pm  Contact information:  [address] [facility] [address] [place] [address]  [phone]

## 2019-06-13 ENCOUNTER — PATIENT OUTREACH (OUTPATIENT)
Dept: ADMINISTRATIVE | Facility: CLINIC | Age: 44
End: 2019-06-13

## 2019-06-13 NOTE — PROGRESS NOTES
C3 nurse attempted to contact patient. The following occurred:   C3 nurse attempted to contact Gilda Schroeder  for a TCC post hospital discharge follow up call. The patient is unable to conduct the call @ this time. The patient requested a callback.    The patient has a scheduled HOSFU appointment with Ameya on 6/14 @ 0900. Message sent to Physician staff. NON [facility]

## 2019-06-14 LAB
BACTERIA BLD CULT: NORMAL
BACTERIA BLD CULT: NORMAL

## 2019-06-14 NOTE — DISCHARGE SUMMARY
[facility] [place], [place] Medicine  Discharge Summary      Patient Name: Gilda Schroeder  MRN: [id number]  Admission Date: 6/9/2019  Hospital Length of Stay: 3 days  Discharge Date and Time: 6/12/2019  4:33 PM  Attending Physician: No att. providers found   Discharging Provider: Elroy Batres MD  Primary Care Provider: Roselia Rose MD      HPI:   44 yof with hx of ESRD on HD, DM type 2, CAD s/p PCI (2017), HTN, HLD, hx of chronic abdominal pain and gastroparesis presenting with sudden onset abdominal pain with associated intractable nausea, emesis, non radiating chest tightness, and dyspnea since early this AM. Patient denies any fevers, chills, diaphoresis, syncope, visual disturbances, dizziness, palpitations, left arm pain, hemoptysis, diarrhea, or constipation. Last dialysis was Friday, and she get 4x HD per week. No recent travel or sick contacts.    In the ED patient was hypertensive sbp 250s, tachycardic, tachypenic hypoxic 76% requiring O2. Labs significant for BNP 2443, trop 0.39, Cr 12, K 6.2. EKG NRS with ST abnormalities concerning for lateral ischemia. CXR noting pulmonary vascular congestion. Nephrology and cardiology consulted.  paged for admission.    Procedure(s) (LRB):  Left heart cath, 330 pm start (Left)  Fractional Flow [place] (FFR), Coronary  Angioplasty-coronary  Stent, Coronary, Multi Vessel      Hospital Course:   Admitted to hospital medicine for hypertensive crisis, acute pulmonary edema, NSTEMI, abdominal pain with N/V. Cardiology and nephrology consulted. S/p HD 6/9. Patient was started on heparin infusion ACS protocol as her tropoinin increased 0.3 --> 1 --> 1.5 (peaked). S/p LHC 6/10 with PCI x3 FIDE (LCx/OM and RCA). Tolerated procedure well. HD post LHC. Patient's pressure went down some after HD. I titrated several BP meds on her. On dc day patient normotensive stable and afebrile. Eating well and ambulating well w/o any issues. I have extensive  discussion with patient regarding preventive measures for CAD including lifestyle medications, medication compliance yonathan DAPT and HD. Her BP meds were changed on d/c and she needs to follow up with PCP, nephrology and cardiology to titrate these meds. Stable for d/c.     Consults:   Consults (From admission, onward)        Status Ordering Provider     Inpatient consult to Cardiology  Once     Provider:  Qasim Guzman MD    Completed [facility]     Inpatient consult to Nephrology  Once     Provider:  Jaki Leo MD    Completed [facility]     Inpatient consult to Social Work  Once     Provider:  (Not yet assigned)    Completed SANYA GOODMAN          No new Assessment & Plan notes have been filed under this hospital service since the last note was generated.  Service: Hospital Medicine    Final Active Diagnoses:    Diagnosis Date Noted POA    PRINCIPAL PROBLEM:  Acute respiratory failure with hypoxia [J96.01] 06/09/2019 Yes    NSTEMI (non-ST elevated myocardial infarction) [I21.4] 01/16/2019 Yes    Hypertensive crisis [I16.9] 06/09/2019 Yes    Unstable angina pectoris [I20.0] 12/12/2017 Yes    Orthostatic hypotension [I95.1] 06/11/2019 No    Hyperkalemia [E87.5] 06/09/2019 Yes    CAD (coronary artery disease) [I25.10]  Yes    ESRD (end stage renal disease) [N18.6] 08/18/2017 Yes    Type 2 diabetes mellitus, with long-term current use of insulin [E11.9, Z79.4] 07/05/2016 Not Applicable      Problems Resolved During this Admission:       Discharged Condition: stable    Disposition: Home or Self Care    Follow Up:  Follow-up Information     TALAT Mcgrath. Go on 6/14/2019.    Specialty:  Family Medicine  Why:  Outpatient Services: Hospital F/U with NP at 10:00 am at [address] [address] [place] [address]  Contact information:  [address] [place] [address]  [address]  [facility] [address] [phone]             Cici Tineo PA-C. Go on 6/19/2019.    Specialty:   Cardiology  Why:  Outpatient Services: Hospital F/U with Cardiologist at 2:00 pm  Contact information:  [address] [place] [address]  [phone]                 Patient Instructions:   No discharge procedures on file.    Significant Diagnostic Studies: Labs: All labs within the past 24 hours have been reviewed    Pending Diagnostic Studies:     None         Medications:  Reconciled Home Medications:      Medication List      START taking these medications    metoprolol succinate 50 MG 24 hr tablet  Commonly known as:  TOPROL-XL  Take 1 tablet (50 mg total) by mouth once daily.     vitamin renal formula (B-complex-vitamin c-folic acid) 1 mg Cap  Commonly known as:  NEPHROCAPS  Take 1 capsule by mouth once daily.        CHANGE how you take these medications    insulin detemir U-100 100 unit/mL injection  Commonly known as:  LEVEMIR  Inject 7 Units into the skin 2 (two) times daily.  What changed:  how much to take     rosuvastatin 20 MG tablet  Commonly known as:  CRESTOR  Take 1 tablet (20 mg total) by mouth once daily.  What changed:    · medication strength  · how much to take     sevelamer carbonate 800 mg Tab  Commonly known as:  RENVELA  Take 2 tablets (1,600 mg total) by mouth 3 (three) times daily with meals.  What changed:  how much to take        CONTINUE taking these medications    aspirin 81 MG EC tablet  Commonly known as:  ECOTRIN  Take 81 mg by mouth once daily.     calcitRIOL 0.5 MCG Cap  Commonly known as:  ROCALTROL  Take 0.5 mcg by mouth once daily.     clopidogrel 75 mg tablet  Commonly known as:  PLAVIX  Take 75 mg by mouth once daily.     insulin aspart U-100 100 unit/mL injection  Commonly known as:  NOVOLOG  Per home dose Sliding Scale TID with meals.     metoclopramide HCl 5 MG tablet  Commonly known as:  REGLAN  Take 5 mg by mouth 4 (four) times daily.     pantoprazole 40 MG tablet  Commonly known as:  PROTONIX  Take 1 tablet (40 mg total) by mouth once daily.        STOP taking  these medications    amLODIPine 10 MG tablet  Commonly known as:  NORVASC     carvedilol 25 MG tablet  Commonly known as:  COREG     cloNIDine 0.2 MG tablet  Commonly known as:  CATAPRES     hydrALAZINE 100 MG tablet  Commonly known as:  APRESOLINE     hydrOXYzine pamoate 25 MG Cap  Commonly known as:  VISTARIL     ondansetron 4 MG tablet  Commonly known as:  ZOFRAN     promethazine 25 MG tablet  Commonly known as:  PHENERGAN     PRORENAL ORAL            Indwelling Lines/Drains at time of discharge:   Lines/Drains/Airways     Drain                 Hemodialysis AV Fistula 10/13/18 2350 Left upper arm 242 days                Time spent on the discharge of patient: > 30 minutes  Patient was seen and examined on the date of discharge and determined to be suitable for discharge.         Elroy Batres MD  Department of Hospital Medicine  [facility]

## 2019-06-19 ENCOUNTER — OFFICE VISIT (OUTPATIENT)
Dept: CARDIOLOGY | Facility: CLINIC | Age: 44
End: 2019-06-19
Payer: MEDICARE

## 2019-06-19 VITALS
SYSTOLIC BLOOD PRESSURE: 121 MMHG | BODY MASS INDEX: 30.04 KG/M2 | DIASTOLIC BLOOD PRESSURE: 67 MMHG | HEIGHT: 65 IN | WEIGHT: 180.31 LBS | HEART RATE: 80 BPM

## 2019-06-19 DIAGNOSIS — E11.22 TYPE 2 DIABETES MELLITUS WITH CHRONIC KIDNEY DISEASE ON CHRONIC DIALYSIS, UNSPECIFIED WHETHER LONG TERM INSULIN USE: ICD-10-CM

## 2019-06-19 DIAGNOSIS — I15.1 HYPERTENSION SECONDARY TO OTHER RENAL DISORDERS: ICD-10-CM

## 2019-06-19 DIAGNOSIS — N18.6 TYPE 2 DIABETES MELLITUS WITH CHRONIC KIDNEY DISEASE ON CHRONIC DIALYSIS, UNSPECIFIED WHETHER LONG TERM INSULIN USE: ICD-10-CM

## 2019-06-19 DIAGNOSIS — I42.9 CARDIOMYOPATHY, UNSPECIFIED TYPE: ICD-10-CM

## 2019-06-19 DIAGNOSIS — E78.5 HYPERLIPIDEMIA, UNSPECIFIED HYPERLIPIDEMIA TYPE: Primary | ICD-10-CM

## 2019-06-19 DIAGNOSIS — Z99.2 TYPE 2 DIABETES MELLITUS WITH CHRONIC KIDNEY DISEASE ON CHRONIC DIALYSIS, UNSPECIFIED WHETHER LONG TERM INSULIN USE: ICD-10-CM

## 2019-06-19 DIAGNOSIS — I25.110 CORONARY ARTERY DISEASE INVOLVING NATIVE CORONARY ARTERY OF NATIVE HEART WITH UNSTABLE ANGINA PECTORIS: ICD-10-CM

## 2019-06-19 DIAGNOSIS — N18.6 ESRD (END STAGE RENAL DISEASE): ICD-10-CM

## 2019-06-19 PROCEDURE — 99214 OFFICE O/P EST MOD 30 MIN: CPT | Mod: PBBFAC,PO,TXP | Performed by: PHYSICIAN ASSISTANT

## 2019-06-19 PROCEDURE — 99999 PR PBB SHADOW E&M-EST. PATIENT-LVL IV: CPT | Mod: PBBFAC,TXP,, | Performed by: PHYSICIAN ASSISTANT

## 2019-06-19 PROCEDURE — 99214 PR OFFICE/OUTPT VISIT, EST, LEVL IV, 30-39 MIN: ICD-10-PCS | Mod: S$PBB,NTX,, | Performed by: PHYSICIAN ASSISTANT

## 2019-06-19 PROCEDURE — 99999 PR PBB SHADOW E&M-EST. PATIENT-LVL IV: ICD-10-PCS | Mod: PBBFAC,TXP,, | Performed by: PHYSICIAN ASSISTANT

## 2019-06-19 PROCEDURE — 99214 OFFICE O/P EST MOD 30 MIN: CPT | Mod: S$PBB,NTX,, | Performed by: PHYSICIAN ASSISTANT

## 2019-06-19 NOTE — PROGRESS NOTES
Subjective:    Patient ID:  Gilda Schroeder is a 44 y.o. female who presents for follow-up of CAD.    HPI  Ms. Schroeder is a pleasant lady who follows with Dr. Crowder. She has a PMH significant for CAD, ESRD on HD, insulin-dependent DM, HLD, and HTN. She underwent coronary angiography with FFR of the LCx  in March, but no intervention was performed at that time. Last week, while visiting family on the [facility], she developed nausea and vomiting with associated chest discomfort and SOB. She presented to [facility] ED for further evaluation and was found to have elevated troponins. She was taken to the cath lab where FFR of the mid LCx was d0.59 and FFR of the 1st OM was 0.86. She underwent PCI with FIDE to both the mid LCx and OM1. Echo done the that day showed an EF= 35% (previously 50%).     She has done well since her discharge and she is without complaints today. She has been compliant with her medications. She has not had issues with her b-blocker. They have been able to complete her full HD sessions without hypotension. She denies CP, CHRISTIANSEN, PND, orthopnea, or LE edema.     Review of Systems   Constitution: Negative for chills, diaphoresis, fever, weight gain and weight loss.   HENT: Negative for sore throat.    Eyes: Negative for blurred vision, vision loss in left eye, vision loss in right eye and visual disturbance.   Cardiovascular: Negative for chest pain, claudication, dyspnea on exertion, leg swelling, near-syncope, orthopnea, palpitations, paroxysmal nocturnal dyspnea and syncope.   Respiratory: Negative for cough, hemoptysis, shortness of breath, sputum production and wheezing.    Endocrine: Negative for cold intolerance and heat intolerance.   Hematologic/Lymphatic: Negative for adenopathy. Does not bruise/bleed easily.   Skin: Negative for rash.   Musculoskeletal: Negative for falls, muscle weakness and myalgias.   Gastrointestinal: Negative for abdominal pain, change in bowel  "habit, constipation, diarrhea, melena and nausea.   Genitourinary: Negative for bladder incontinence.   Neurological: Negative for dizziness, focal weakness, headaches, light-headedness, numbness and weakness.   Psychiatric/Behavioral: Negative for altered mental status.         Vitals:    06/19/19 1415   BP: 121/67   BP Location: Right arm   Patient Position: Sitting   BP Method: Medium (Automatic)   Pulse: 80   Weight: 81.8 kg (180 lb 5.4 oz)   Height: 5' 5" (1.651 m)   Body mass index is 30.01 kg/m².    Objective:    Physical Exam   Constitutional: She is oriented to person, place, and time. She appears well-developed and well-nourished. No distress.   HENT:   Head: Normocephalic and atraumatic.   Mouth/Throat: Oropharynx is clear and moist.   Eyes: Pupils are equal, round, and reactive to light. Conjunctivae and EOM are normal. No scleral icterus.   Neck: Neck supple. No JVD present. No tracheal deviation present.   Cardiovascular: Normal rate and regular rhythm. Exam reveals no gallop and no friction rub.   No murmur heard.  Pulmonary/Chest: Effort normal and breath sounds normal. No respiratory distress. She has no wheezes. She has no rales. She exhibits no tenderness.   Abdominal: Soft. Bowel sounds are normal. She exhibits no distension. There is no hepatosplenomegaly. There is no tenderness.   Musculoskeletal: She exhibits no edema or tenderness.   Neurological: She is alert and oriented to person, place, and time.   Skin: Skin is warm and dry. No rash noted. No erythema.   Psychiatric: She has a normal mood and affect. Her behavior is normal.         Assessment:       CAD (coronary artery disease)  S/p LCx and OM1 FIDE on 6/10/19.  On ASA, Plavix, statin, and b-blocker.     HTN (hypertension)  Well controlled on current regimen.     ESRD (end stage renal disease)  On HD.     Diabetes mellitus  Insulin-dependent.   Last A1C 8.2%.      Hyperlipidemia  Continue statin.   Recheck LFTs and Lipids in 3 months. "   Goal LDL <70.        Plan:       Continue current medications.   Discussed importance of continue Plavix uninterrupted for 1 year.   Recheck lipids and LFTs in 3 months.   Repeat echo to assess LV function. Eval for ICD if no improvement for secondary prevention.   F/U with Dr. Crowder as scheduled in October.

## 2019-06-24 ENCOUNTER — TELEPHONE (OUTPATIENT)
Dept: CARDIOLOGY | Facility: CLINIC | Age: 44
End: 2019-06-24

## 2019-06-24 NOTE — TELEPHONE ENCOUNTER
07/16/19 10am is the next avaialble appt for dr granger, left vm for pt     ef      ----- Message from Laurence Jenkins sent at 6/24/2019  9:36 AM CDT -----  Contact: self  Type:  Sooner Appointment Request    Patient is requesting a sooner appointment.  Patient declined first available appointment listed as well as another facility and provider .  Patient will not accept being placed on the waitlist and is requesting a message be sent to doctor.    When is the first available appointment? 7/16    Symptoms: f/u    Would the patient rather a call back or a response via [facility]? Call back  Best Call Back Number: [phone]

## 2019-07-02 NOTE — PROGRESS NOTES
Patient made inactive on the wait list due to insurance issues - no Medicare Part D.  has been unsuccessful in contacting the patient to discuss coverage. [employer] is no longer effective.

## 2019-07-02 NOTE — LETTER
July 2, 2019    Gilda Schroeder  [address] [place] [address]            Dear Gilda Jovani Schroeder:  MRN: [id number]    The [facility] team reviewed your transplant candidacy.  It is our programs opinion that you are temporarily not a transplant candidate at our facility as of 7/2/2019 because of insurance issues - No Medicare Part D coverage.  You will remain listed on the wait list, but will not be able to receive a transplant until this issue is resolved.  You have not been removed from the list.    Attached is a letter from the [employer] for Organ Sharing (UNOS).  It describes the services and information offered to patients by UNOS and the Organ Procurement and Transplant Network.    The [facility] team remains available to answer any questions.  Should you have any questions regarding this decision, please do not hesitate to contact our pre-transplant office.      Sincerely,        Manju Zhu MD  Medical Director, Kidney & Kidney/Pancreas Transplantation    Cc: Dr. Rose/[facility]          OPTN/UNOS: Your Resource for Organ Transplant Information        If you have a question regarding your own medical care, you always should call your transplant center first. However, for general organ transplant-related information, you can call the [employer] for Organ Sharing (UNOS) toll-free patient services line at 1-328.304.4863.    Anyone, including potential transplant candidates, recipients, family members/friends, living donors, and/or donor family members can call this number to:    · talk about organ donation, living donation, how transplant and donation work, the donation process, transplant policies, and transplant/donor information;  · get a free patient information kit with helpful booklets, waiting list and transplant information, and a list of all transplant centers;  · ask questions about the Organ Procurement and  Transplantation Network (OPTN) web site (www.optn.transplant.hrsa.gov); the UNOS Web site (www.unos.org); or the UNOS web site for living donors and transplant recipients (www.transplantliving.org);  · learn how UNOS and the OPTN can help you;  · talk about any concerns that you may have with a transplant center and how they perform    [facility] is a not-for-profit organization that provides all of the administrative services for the national OPTN under federal contract to the Health Resources and Services Administration (HRSA), an agency under the U.S. Department of Health and Human Services (HHS).     UNOS and OPTN responsibilities include:    · writing educational material for patients, the public and professionals;  · helping to make people aware of the need for donated organs and tissue;  · writing organ transplant policy with help from doctors, nurses, transplant patients/candidates, donor families, living donors, and the public;  · coordinating the organ matching and placement process;  · collecting information about every organ transplant and donation that occurs in the [place].    Remember, you should contact your transplant center directly if you have questions or concerns about your own medical care including medical records, work-up progress and test reports. [facility] is not your transplant center, and staff at [facility] will not be able to transfer you to your transplant center, so keep your transplant centers phone number handy. But, while you research your transplant needs and learn as much as you can about transplantation and donation, we welcome your call to our toll-free patient services line at [phone].

## 2019-07-12 ENCOUNTER — TELEPHONE (OUTPATIENT)
Dept: TRANSPLANT | Facility: CLINIC | Age: 44
End: 2019-07-12

## 2019-07-12 NOTE — TELEPHONE ENCOUNTER
----- Message from Daniela Porras sent at 7/11/2019  5:35 PM CDT -----  Contact: [facility] ([facility] dialysis): [phone]      ----- Message -----  From: Kim Trejo  Sent: 7/11/2019   4:43 PM  To: Kidney Waitlisted Coordinator    Needs Advice    Reason for call: [facility] would like to speak with someone re the pt, states the pt does has prescription coverage          Communication Preference: Verito ([facility] dialysis): [phone]

## 2019-07-16 ENCOUNTER — OFFICE VISIT (OUTPATIENT)
Dept: CARDIOLOGY | Facility: CLINIC | Age: 44
End: 2019-07-16
Payer: COMMERCIAL

## 2019-07-16 VITALS
BODY MASS INDEX: 30.45 KG/M2 | SYSTOLIC BLOOD PRESSURE: 132 MMHG | OXYGEN SATURATION: 96 % | WEIGHT: 183 LBS | RESPIRATION RATE: 16 BRPM | HEART RATE: 81 BPM | DIASTOLIC BLOOD PRESSURE: 80 MMHG

## 2019-07-16 DIAGNOSIS — E11.22 TYPE 2 DIABETES MELLITUS WITH CHRONIC KIDNEY DISEASE ON CHRONIC DIALYSIS, UNSPECIFIED WHETHER LONG TERM INSULIN USE: ICD-10-CM

## 2019-07-16 DIAGNOSIS — I15.1 HYPERTENSION SECONDARY TO OTHER RENAL DISORDERS: ICD-10-CM

## 2019-07-16 DIAGNOSIS — Z99.2 TYPE 2 DIABETES MELLITUS WITH CHRONIC KIDNEY DISEASE ON CHRONIC DIALYSIS, WITH LONG-TERM CURRENT USE OF INSULIN: Primary | ICD-10-CM

## 2019-07-16 DIAGNOSIS — N18.6 TYPE 2 DIABETES MELLITUS WITH CHRONIC KIDNEY DISEASE ON CHRONIC DIALYSIS, WITH LONG-TERM CURRENT USE OF INSULIN: Primary | ICD-10-CM

## 2019-07-16 DIAGNOSIS — I25.110 CORONARY ARTERY DISEASE INVOLVING NATIVE CORONARY ARTERY OF NATIVE HEART WITH UNSTABLE ANGINA PECTORIS: ICD-10-CM

## 2019-07-16 DIAGNOSIS — E11.22 TYPE 2 DIABETES MELLITUS WITH CHRONIC KIDNEY DISEASE ON CHRONIC DIALYSIS, WITH LONG-TERM CURRENT USE OF INSULIN: Primary | ICD-10-CM

## 2019-07-16 DIAGNOSIS — Z79.4 TYPE 2 DIABETES MELLITUS WITH CHRONIC KIDNEY DISEASE ON CHRONIC DIALYSIS, WITH LONG-TERM CURRENT USE OF INSULIN: Primary | ICD-10-CM

## 2019-07-16 DIAGNOSIS — Z99.2 TYPE 2 DIABETES MELLITUS WITH CHRONIC KIDNEY DISEASE ON CHRONIC DIALYSIS, UNSPECIFIED WHETHER LONG TERM INSULIN USE: ICD-10-CM

## 2019-07-16 DIAGNOSIS — N18.6 TYPE 2 DIABETES MELLITUS WITH CHRONIC KIDNEY DISEASE ON CHRONIC DIALYSIS, UNSPECIFIED WHETHER LONG TERM INSULIN USE: ICD-10-CM

## 2019-07-16 PROCEDURE — 99214 PR OFFICE/OUTPT VISIT, EST, LEVL IV, 30-39 MIN: ICD-10-PCS | Mod: S$PBB,TXP,, | Performed by: INTERNAL MEDICINE

## 2019-07-16 PROCEDURE — 99999 PR PBB SHADOW E&M-EST. PATIENT-LVL III: CPT | Mod: PBBFAC,TXP,, | Performed by: INTERNAL MEDICINE

## 2019-07-16 PROCEDURE — 99214 OFFICE O/P EST MOD 30 MIN: CPT | Mod: S$PBB,TXP,, | Performed by: INTERNAL MEDICINE

## 2019-07-16 PROCEDURE — 99999 PR PBB SHADOW E&M-EST. PATIENT-LVL III: ICD-10-PCS | Mod: PBBFAC,TXP,, | Performed by: INTERNAL MEDICINE

## 2019-07-16 RX ORDER — LOSARTAN POTASSIUM 50 MG/1
50 TABLET ORAL DAILY
Qty: 90 TABLET | Refills: 3 | Status: SHIPPED | OUTPATIENT
Start: 2019-07-16 | End: 2019-11-08 | Stop reason: SDUPTHER

## 2019-07-16 NOTE — PROGRESS NOTES
CARDIOVASCULAR CONSULTATION    REASON FOR CONSULT:   Gilda Schroeder is a 44 y.o. female who presents for follow-up after revascularization for her coronary artery disease.    HISTORY OF PRESENT ILLNESS:     Patient here for follow-up after recent revascularization.  No complications from revascularization.  Continue aspirin, Plavix.  No chest pains no orthopnea no PND no swelling of feet.  Doing fine.    Cardiology consult from recent hospitalization in June 2019:  This is a 44 y.o. Female, with a PMHx of peritoneal dialysis and end stage renal disease on hemodialysis, who presents to the Emergency Department with a cc of upper abdomianl pain since last night. Patient reports associated nausea and vomiting. She also reports shortness of breath and chest tightness which began on her drive to the Emergency Department. She denies hematemesis and hematochezia. There were no alleviating or worsening factor reported. She has a past surgical history of stent placement x2. Her last dialysis treatment was 2 days ago.  Her cardiac history significant for the fact that she had RCA PCI done in 2017.  She had a repeat coronary angiogram performed in March of 2017 which showed patent RCA stents and nonobstructive coronary artery disease.  Her recent echocardiogram had shown an ejection fraction of 50%.  Her presenting BNP and troponin are elevated.  Initial troponin is 0.3 and BNP is 2 443  Her EKG shows normal sinus rhythm with ST abnormalities and depression suggestive of lateral ischemia.  Patient is undergoing hemodialysis when I saw her.  She states that the reason she came to the emergency room was uncontrollable vomiting.  She also states that she had some chest pains.  She is a poor historian and is unable to characterize the chest pain but states that it is left-sided.  She states that it is reproducible when I pressed on the location of the chest pain, however at the same time she states that this is similar  to the pain she had when she had her PCI in 2017 as well as similar to the pain she had when she had a recent angiogram in 2019 which showed nonobstructive coronary artery disease.  Denies orthopnea or PND.  Does have shortness of breath.      PAST MEDICAL HISTORY:     Past Medical History:   Diagnosis Date    Allergy     morphine    Anemia     Anticoagulant long-term use     heparin    CAD (coronary artery disease)     stent x 2    Diabetes mellitus     Diabetes mellitus with ESRD (end-stage renal disease) 2016    Disorder of kidney and ureter     ESRD (end stage renal disease) on dialysis     Gastroesophageal reflux disease without esophagitis 1/10/2018    HTN (hypertension)     Obesity     Peritonitis associated with peritoneal dialysis 2017       PAST SURGICAL HISTORY:     Past Surgical History:   Procedure Laterality Date    ANGIOGRAM-CORONARY N/A 2017    Performed [facility]der [facility] at [facility] CATH    Angioplasty-coronary  6/10/2019    Performed [facility] Qasim Guzman [facility] at [facility] CATH LAB    AV FISTULA PLACEMENT       SECTION      EGD (ESOPHAGOGASTRODUODENOSCOPY) N/A 3/14/2019    Performed by Perry Washburn MD at [facility] ENDO    EXCISION-PILONIDAL CYST N/A 2016    [facility] OR    Fractional Flow [place] (FFR), Coronary  6/10/2019    Performed [facility] at [facility] CATH LAB    Fractional Flow [place] (FFR), Coronary  3/16/2019    Performed by L[facility]o Crowder MD at [facility] CATH    heart stent  2017    INSERTION-CATHETER-HEMODIALYSIS N/A 2017    Performed by Miles Bond MD at [facility] OR    Left heart cath Right 3/16/2019    Performed by [facility] Crowder MD at [facility] CATH    Left heart cath, 330 pm start Left 6/10/2019    [facility] at [facility] CATH LAB    PLACEMENT PERITONEAL DIALYSIS CATHETER      PORTACATH PLACEMENT      for dialysis; removed     REMOVAL-CATHETER-DIALYSIS-PERITONEAL N/A 8/18/2017    [facility] OR    SKIN GRAFT  10/2017    Stent, Coronary, Multi Vessel  6/10/2019    Performed by [facility] at [facility] CATH LAB    TUBAL LIGATION         ALLERGIES AND MEDICATION:     Review of patient's allergies indicates:   Allergen Reactions    Morphine Other (See Comments)     Heart rate slows        Medication List           Accurate as of 7/16/19 10:32 AM. If you have any questions, ask your nurse or doctor.               CONTINUE taking these medications    aspirin 81 MG EC tablet  Commonly known as:  ECOTRIN     calcitRIOL 0.5 MCG Cap  Commonly known as:  ROCALTROL     clopidogrel 75 mg tablet  Commonly known as:  PLAVIX     insulin aspart U-100 100 unit/mL injection  Commonly known as:  NOVOLOG  Per home dose Sliding Scale TID with meals.     insulin detemir U-100 100 unit/mL injection  Commonly known as:  LEVEMIR  Inject 7 Units into the skin 2 (two) times daily.     metoclopramide HCl 5 MG tablet  Commonly known as:  REGLAN     metoprolol succinate 50 MG 24 hr tablet  Commonly known as:  TOPROL-XL  Take 1 tablet (50 mg total) by mouth once daily.     pantoprazole 40 MG tablet  Commonly known as:  PROTONIX  Take 1 tablet (40 mg total) by mouth once daily.     rosuvastatin 20 MG tablet  Commonly known as:  CRESTOR  Take 1 tablet (20 mg total) by mouth once daily.     sevelamer carbonate 800 mg Tab  Commonly known as:  RENVELA  Take 2 tablets (1,600 mg total) by mouth 3 (three) times daily with meals.     vitamin renal formula (B-complex-vitamin c-folic acid) 1 mg Cap  Commonly known as:  NEPHROCAPS  Take 1 capsule by mouth once daily.            SOCIAL HISTORY:     Social History     Socioeconomic History    Marital status:      Spouse name: Not on file    Number of children: Not on file    Years of education: Not on file    Highest education level: Not on file   Occupational History     Comment:  unemployed   Social Needs    Financial resource strain: Not on file    Food insecurity:     Worry: Not on file     Inability: Not on file    Transportation needs:     Medical: Not on file     Non-medical: Not on file   Tobacco Use    Smoking status: Former Smoker     Packs/day: 0.50     Years: 12.00     Pack years: 6.00     Types: Cigarettes     Last attempt to quit:      Years since quittin.5   Substance and Sexual Activity    Alcohol use: No    Drug use: No    Sexual activity: Yes     Partners: Male   Lifestyle    Physical activity:     Days per week: Not on file     Minutes per session: Not on file    Stress: Not on file   Relationships    Social connections:     Talks on phone: Not on file     Gets together: Not on file     Attends Rastafarian service: Not on file     Active member of club or organization: Not on file     Attends meetings of clubs or organizations: Not on file     Relationship status: Not on file   Other Topics Concern    Not on file   Social History Narrative    ** Merged History Encounter **            FAMILY HISTORY:     Family History   Problem Relation Age of Onset    Diabetes Mother     No Known Problems Father     No Known Problems Sister     No Known Problems Brother        REVIEW OF SYSTEMS:   Review of Systems   Constitutional: Negative.    HENT: Negative.    Eyes: Negative.    Respiratory: Negative.    Cardiovascular: Negative.    Gastrointestinal: Negative.    Genitourinary: Negative.    Musculoskeletal: Negative.    Skin: Negative.    Neurological: Negative.    Endo/Heme/Allergies: Negative.    All other systems reviewed and are negative.      A 10 point review of systems was performed and all the pertinent positives have been mentioned. Rest of review of systems was negative.        PHYSICAL EXAM:     Vitals:    19 1020   BP: 132/80   Pulse: 81   Resp: 16    Body mass index is 30.45 kg/m².  Weight: 83 kg (182 lb 15.7 oz)         Physical Exam    Constitutional: She is oriented to person, place, and time. She appears well-developed and well-nourished. She is active.   HENT:   Head: Normocephalic and atraumatic.   Right Ear: Hearing normal.   Left Ear: Hearing normal.   Nose: Nose normal.   Mouth/Throat: Mucous membranes are normal.   Eyes: Pupils are equal, round, and reactive to light. Conjunctivae and lids are normal.   Neck: Normal range of motion. Neck supple.   Cardiovascular: Normal rate, regular rhythm, normal heart sounds, intact distal pulses and normal pulses.   Pulmonary/Chest: Effort normal and breath sounds normal.   Abdominal: Soft. Normal appearance. There is no tenderness.   Musculoskeletal: She exhibits no edema or deformity.   Neurological: She is alert and oriented to person, place, and time.   Skin: Skin is warm, dry and intact.   Psychiatric: She has a normal mood and affect. Her speech is normal.   Nursing note and vitals reviewed.        DATA:     Laboratory:  CBC:  Recent Labs   Lab 06/10/19  0624 06/11/19  0655 06/12/19  0602   WBC 13.61 H 8.51 8.84   Hemoglobin 11.0 L 9.5 L 9.2 L   Hematocrit 34.2 L 29.4 L 28.5 L   Platelets 310 265 259       CHEMISTRIES:  Recent Labs   Lab 06/10/19  0624 06/11/19  0655 06/12/19  0602   Glucose 308 H 133 H 110   Sodium 133 L 137 137   Potassium 5.3 H 4.2 4.6   BUN, Bld 33 H 17 35 H   Creatinine 8.7 H 5.1 H 8.1 H   eGFR if  6 A 11 A 6 A   eGFR if non  5 A 10 A 6 A   Calcium 11.2 H 9.2 9.4   Magnesium 1.9 1.7 1.9       CARDIAC BIOMARKERS:  Recent Labs   Lab 01/16/19  1200  06/09/19  2053 06/10/19  0624 06/10/19  1137     --   --   --   --    Troponin I 3.620 HH   < > 1.569 H 1.178 H 0.804 H    < > = values in this interval not displayed.       COAGS:  Recent Labs   Lab 03/11/19  1648 06/09/19  0900 06/09/19 2053   INR 1.0 1.0 1.0       LIPIDS/LFTS:  Recent Labs   Lab 12/11/17  0454  06/10/19  0624 06/11/19  0655 06/12/19  0602   Cholesterol 176  --   --   --    --    Triglycerides 204 H  --   --   --   --    HDL 39 L  --   --   --   --    LDL Cholesterol 96.2  --   --   --   --    Non-HDL Cholesterol 137  --   --   --   --    AST  --    < > 24 21 16   ALT  --    < > 24 16 16    < > = values in this interval not displayed.       Hemoglobin A1C   Date Value Ref Range Status   06/10/2019 8.2 (H) 4.0 - 5.6 % Final     Comment:     ADA Screening Guidelines:  5.7-6.4%  Consistent with prediabetes  >or=6.5%  Consistent with diabetes  High levels of fetal hemoglobin interfere with the HbA1C  assay. Heterozygous hemoglobin variants (HbS, HgC, etc)do  not significantly interfere with this assay.   However, presence of multiple variants may affect accuracy.     01/15/2019 5.9 (H) 0.0 - 5.6 % Final     Comment:     Reference Interval:  5.0 - 5.6 Normal   5.7 - 6.4 High Risk   > 6.5 Diabetic    Hgb A1c results are standardized based on the (NGSP) National   Glycohemoglobin Standardization Program.    Hemoglobin A1C levels are related to mean serum/plasma glucose   during the preceding 2-3 months.        10/13/2018 5.8 (H) 0.0 - 5.6 % Final     Comment:     Reference Interval:  5.0 - 5.6 Normal   5.7 - 6.4 High Risk   > 6.5 Diabetic    Hgb A1c results are standardized based on the (NGSP) National   Glycohemoglobin Standardization Program.    Hemoglobin A1C levels are related to mean serum/plasma glucose   during the preceding 2-3 months.            TSH  Recent Labs   Lab 12/10/17  2300 01/10/18  1627 03/01/18  1034   TSH 2.260 2.430 1.722       The ASCVD Risk score ([place] DI Jr., et al., 2013) failed to calculate for the following reasons:    The patient has a prior MI or stroke diagnosis           Cardiovascular Testing:    Lucia 10, 2019 coronary angiogram:    Description of the findings of the procedure:   1. PCI of mid to distal RCA with drug-eluting stent x1  2. PCI of OM1 with drug-eluting stent x1  3. PCI of mid circ with drug-eluting stent x1     Findings/Key Components:  LVEDP:  12 mmHg     LM:  No significant stenosis  LAD:  Proximal 20% stenosis  LCx:  Mid circ has a severe 70-80 % eccentric stenosis right after the takeoff of 1st OM.  IFR was performed across it and was found to be 0.59.  Successful PCI with drug-eluting stent x 1.  Post dilated at high atmospheric pressures with excellent angiographic results and BRITTANY 3 flow post PCI  OM1 has a 70% stenosis IFR was performed across it and was found to be 0.83.  PCI was performed with drug-eluting stent x1.  Post dilated at high atmospheric pressures with excellent angiographic results and BRITTANY 3 flow post PCI  RCA:  Previously placed RCA stents are patent.  Distal to the RCA stent there is a long 70% stenosis.  IFR was performed across it and it was found to be 0.86.  PCI of RCA performed with drug-eluting stent x1.  Post dilated at high atmospheric pressures with excellent angiographic results and BRITTANY 3 flow post PCI     Hemostasis:  R Radial band        Impression:  Hemodynamically significant stenosis of RCA, mid circ and OM1.  PCI of RCA, mid circ and OM1 with drug-eluting stents     Plan:  Aspirin 81 mg daily indefinitely     Plavix 75 mg daily for at least 1 year uninterrupted.  Longer if no contraindications.     Aggressive risk factor modification     Follow-up in Cardiology Clinic in 1 week    2D echocardiogram June 2019:    · Moderately decreased left ventricular systolic function. The estimated ejection fraction is 35%  · Mild-moderate global hypokinetic wall motion.  · Mild concentric left ventricular hypertrophy.  · Grade III (severe) left ventricular diastolic dysfunction consistent with restrictive physiology.  · Normal right ventricular systolic function.  · Mild mitral regurgitation.  · Mild tricuspid regurgitation.  · The estimated PA systolic pressure is 59 mm Hg  · Pulmonary hypertension present.      2D echocardiogram March 2019:    · Mild left atrial enlargement.  · Mild concentric left ventricular  hypertrophy.  · Low normal left ventricular systolic function. The estimated ejection fraction is 50%  · Normal LV diastolic function.  · Normal right ventricular systolic function.  · Mild mitral regurgitation.  · Mild tricuspid regurgitation.  · Normal central venous pressure (3 mm Hg).  · The estimated PA systolic pressure is 34 mm Hg          ASSESSMENT AND PLAN     Patient Active Problem List   Diagnosis    Organ transplant candidate    Type 2 diabetes mellitus, with long-term current use of insulin    Benign hypertension with ESRD (end-stage renal disease)    Insulin long-term use    Anemia in CKD (chronic kidney disease)    Obesity    BMI 35.0-35.9,adult    ESRD (end stage renal disease)    Peritoneal dialysis-associated peritonitis    Patient on waiting list for kidney transplant    Elevated troponin    Unstable angina pectoris    SOB (shortness of breath)    Palpitations    Gastroesophageal reflux disease without esophagitis    Abdominal pain    Sinus tachycardia    Coronary artery disease involving native coronary artery of native heart    Epigastric pain    Hypertensive urgency    Gastroparesis    Anticoagulant long-term use--Plavix    ESRD (end stage renal disease)    Gastroparesis    HTN (hypertension)    Diabetes mellitus    CAD (coronary artery disease)    Gastroenteritis    Metabolic acidosis    Transaminitis    NSTEMI (non-ST elevated myocardial infarction)    Chronic renal failure    Volume overload    Nausea    Acute gastritis without hemorrhage    Abnormal nuclear stress test    Hypertensive crisis    Acute respiratory failure with hypoxia    Hyperkalemia    Orthostatic hypotension    Hyperlipidemia       1.  Coronary artery disease status post multiple vessel PCI in June 2019:  Continue aspirin 81 mg daily indefinitely, Plavix 75 mg daily for at least 1 year and longer if no contraindications considering her young age and aggressive coronary artery  disease.    2.  Diabetes:  Aggressive risk factor modification    3.  Ischemic cardiomyopathy:  On Toprol-XL.  I will add losartan 50 mg daily.  Titrate to the maximally tolerated dose.  Check 2D echocardiogram in 2 months.    4.  Continue Crestor          Thank you very much for involving me in the care of your patient.  Please do not hesitate to contact me if there are any questions.      Qasim Guzman MD, FAC, [facility]  Interventional Cardiologist, [facility].       Follow-up in 2 months after echocardiogram    This note was dictated with the help of speech recognition software.  There might be un-intended errors and/or substitutions.

## 2019-07-16 NOTE — PATIENT INSTRUCTIONS
Understanding Coronary Artery Disease (CAD)    To understand coronary artery disease (CAD), you need to know how your heart works. Your heart is a muscle that pumps blood throughout your body. To work right, your heart needs a steady supply of oxygen. It gets this oxygen from blood supplied by the coronary arteries.     Healthy artery   Healthy artery. When a coronary artery is healthy and has no blockages, blood flows through easily. Healthy arteries can easily supply the oxygen-rich blood your heart needs.     Damaged artery   Damaged artery. Coronary artery disease begins when damage to the artery lining leads to the buildup of fat-like substances and cholesterol along the artery wall. This is called plaque. This damage could be caused by things like high blood pressure or smoking. This plaque buildup begins to narrow the arteries carrying blood to the heart. This is called atherosclerosis,     Narrowed artery   Narrowed artery. As more plaque builds up, your artery has trouble supplying blood to your heart muscle when it needs it most, such as during exercise. You may not feel any symptoms when this happens. Or you may feel angina--pressure, tightness, achiness, or pain in your chest, jaw, neck, back, or arm.     Blocked artery   Blocked artery. A piece of plaque may break off and completely block the artery. Or a blood clot may plug the narrowed artery. When this happens, blood flow is blocked from reaching the heart. Without oxygen-rich blood, part of the heart muscle becomes damaged and stops working. You may feel crushing pressure or pain in or around your chest. This is a heart attack (acute myocardial infarction, or AMI) and is a medical emergency.     Date Last Reviewed: 3/28/2016  © 3631-5024 [employer]. [address] [place] [address], [place] [address]. All rights reserved. This information is not intended as a substitute for professional medical care. Always follow your healthcare professional's  instructions.

## 2019-10-07 ENCOUNTER — CLINICAL SUPPORT (OUTPATIENT)
Dept: CARDIOLOGY | Facility: CLINIC | Age: 44
End: 2019-10-07
Attending: PHYSICIAN ASSISTANT
Payer: MEDICARE

## 2019-10-07 VITALS
DIASTOLIC BLOOD PRESSURE: 82 MMHG | SYSTOLIC BLOOD PRESSURE: 148 MMHG | HEIGHT: 65 IN | BODY MASS INDEX: 30.32 KG/M2 | HEART RATE: 83 BPM | WEIGHT: 182 LBS

## 2019-10-07 DIAGNOSIS — I42.9 CARDIOMYOPATHY, UNSPECIFIED TYPE: ICD-10-CM

## 2019-10-07 LAB
ASCENDING AORTA: 2.38 CM
AV INDEX (PROSTH): 0.8
AV MEAN GRADIENT: 3 MMHG
AV PEAK GRADIENT: 5 MMHG
AV VALVE AREA: 2.2 CM2
AV VELOCITY RATIO: 0.82
BSA FOR ECHO PROCEDURE: 1.95 M2
CV ECHO LV RWT: 0.4 CM
DOP CALC AO PEAK VEL: 1.13 M/S
DOP CALC AO VTI: 20.56 CM
DOP CALC LVOT AREA: 2.7 CM2
DOP CALC LVOT DIAMETER: 1.87 CM
DOP CALC LVOT PEAK VEL: 0.93 M/S
DOP CALC LVOT STROKE VOLUME: 45.18 CM3
DOP CALCLVOT PEAK VEL VTI: 16.46 CM
E WAVE DECELERATION TIME: 166.28 MSEC
E/A RATIO: 2.5
E/E' RATIO: 18.18 M/S
ECHO LV POSTERIOR WALL: 0.98 CM (ref 0.6–1.1)
FRACTIONAL SHORTENING: 26 % (ref 28–44)
INTERVENTRICULAR SEPTUM: 0.98 CM (ref 0.6–1.1)
IVRT: 0.11 MSEC
LA MAJOR: 5.36 CM
LA MINOR: 5.14 CM
LA WIDTH: 3.8 CM
LEFT ATRIUM SIZE: 3.64 CM
LEFT ATRIUM VOLUME INDEX: 32.5 ML/M2
LEFT ATRIUM VOLUME: 61.7 CM3
LEFT INTERNAL DIMENSION IN SYSTOLE: 3.63 CM (ref 2.1–4)
LEFT VENTRICLE DIASTOLIC VOLUME INDEX: 59.49 ML/M2
LEFT VENTRICLE DIASTOLIC VOLUME: 113.05 ML
LEFT VENTRICLE MASS INDEX: 90 G/M2
LEFT VENTRICLE SYSTOLIC VOLUME INDEX: 29.2 ML/M2
LEFT VENTRICLE SYSTOLIC VOLUME: 55.45 ML
LEFT VENTRICULAR INTERNAL DIMENSION IN DIASTOLE: 4.9 CM (ref 3.5–6)
LEFT VENTRICULAR MASS: 171.31 G
LV LATERAL E/E' RATIO: 16.67 M/S
LV SEPTAL E/E' RATIO: 20 M/S
MV PEAK A VEL: 0.4 M/S
MV PEAK E VEL: 1 M/S
PISA TR MAX VEL: 3.02 M/S
PULM VEIN S/D RATIO: 0.67
PV PEAK D VEL: 0.57 M/S
PV PEAK S VEL: 0.38 M/S
RA MAJOR: 5.3 CM
RA PRESSURE: 8 MMHG
RA WIDTH: 4.14 CM
RIGHT VENTRICULAR END-DIASTOLIC DIMENSION: 3.59 CM
RV TISSUE DOPPLER FREE WALL SYSTOLIC VELOCITY 1 (APICAL 4 CHAMBER VIEW): 156.68 CM/S
SINUS: 2.67 CM
STJ: 2.17 CM
TDI LATERAL: 0.06 M/S
TDI SEPTAL: 0.05 M/S
TDI: 0.06 M/S
TR MAX PG: 36 MMHG
TRICUSPID ANNULAR PLANE SYSTOLIC EXCURSION: 1.66 CM
TV REST PULMONARY ARTERY PRESSURE: 44 MMHG

## 2019-10-07 PROCEDURE — 99999 PR PBB SHADOW E&M-EST. PATIENT-LVL II: CPT | Mod: PBBFAC,TXP,,

## 2019-10-07 PROCEDURE — 93306 TTE W/DOPPLER COMPLETE: CPT | Mod: PBBFAC,PO,NTX | Performed by: INTERNAL MEDICINE

## 2019-10-07 PROCEDURE — 93306 TRANSTHORACIC ECHO (TTE) COMPLETE (CUPID ONLY): ICD-10-PCS | Mod: 26,S$PBB,TXP, | Performed by: INTERNAL MEDICINE

## 2019-10-07 PROCEDURE — 99999 PR PBB SHADOW E&M-EST. PATIENT-LVL II: ICD-10-PCS | Mod: PBBFAC,TXP,,

## 2019-10-07 PROCEDURE — 99212 OFFICE O/P EST SF 10 MIN: CPT | Mod: PBBFAC,PO,25,NTX

## 2019-10-10 ENCOUNTER — LAB VISIT (OUTPATIENT)
Dept: LAB | Facility: HOSPITAL | Age: 44
End: 2019-10-10
Attending: INTERNAL MEDICINE
Payer: MEDICARE

## 2019-10-10 DIAGNOSIS — I12.0 BENIGN HYPERTENSION WITH ESRD (END-STAGE RENAL DISEASE): ICD-10-CM

## 2019-10-10 DIAGNOSIS — N18.6 BENIGN HYPERTENSION WITH ESRD (END-STAGE RENAL DISEASE): ICD-10-CM

## 2019-10-10 DIAGNOSIS — I25.10 CORONARY ARTERY DISEASE INVOLVING NATIVE CORONARY ARTERY OF NATIVE HEART WITHOUT ANGINA PECTORIS: ICD-10-CM

## 2019-10-10 LAB
ALBUMIN SERPL BCP-MCNC: 3.5 G/DL (ref 3.5–5.2)
ALP SERPL-CCNC: 164 U/L (ref 55–135)
ALT SERPL W/O P-5'-P-CCNC: 115 U/L (ref 10–44)
ANION GAP SERPL CALC-SCNC: 17 MMOL/L (ref 8–16)
AST SERPL-CCNC: 43 U/L (ref 10–40)
BILIRUB DIRECT SERPL-MCNC: 0.5 MG/DL (ref 0.1–0.3)
BILIRUB SERPL-MCNC: 0.9 MG/DL (ref 0.1–1)
BNP SERPL-MCNC: 4556 PG/ML (ref 0–99)
BUN SERPL-MCNC: 60 MG/DL (ref 6–20)
CALCIUM SERPL-MCNC: 9.8 MG/DL (ref 8.7–10.5)
CHLORIDE SERPL-SCNC: 96 MMOL/L (ref 95–110)
CHOLEST SERPL-MCNC: 117 MG/DL (ref 120–199)
CHOLEST/HDLC SERPL: 2 {RATIO} (ref 2–5)
CO2 SERPL-SCNC: 26 MMOL/L (ref 23–29)
CREAT SERPL-MCNC: 11 MG/DL (ref 0.5–1.4)
EST. GFR  (AFRICAN AMERICAN): 4.4 ML/MIN/1.73 M^2
EST. GFR  (NON AFRICAN AMERICAN): 3.8 ML/MIN/1.73 M^2
GLUCOSE SERPL-MCNC: 141 MG/DL (ref 70–110)
HDLC SERPL-MCNC: 59 MG/DL (ref 40–75)
HDLC SERPL: 50.4 % (ref 20–50)
LDLC SERPL CALC-MCNC: 43.6 MG/DL (ref 63–159)
NONHDLC SERPL-MCNC: 58 MG/DL
POTASSIUM SERPL-SCNC: 4.2 MMOL/L (ref 3.5–5.1)
PROT SERPL-MCNC: 7.8 G/DL (ref 6–8.4)
SODIUM SERPL-SCNC: 139 MMOL/L (ref 136–145)
TRIGL SERPL-MCNC: 72 MG/DL (ref 30–150)

## 2019-10-10 PROCEDURE — 36415 COLL VENOUS BLD VENIPUNCTURE: CPT | Mod: PO,NTX

## 2019-10-10 PROCEDURE — 80061 LIPID PANEL: CPT | Mod: NTX

## 2019-10-10 PROCEDURE — 80076 HEPATIC FUNCTION PANEL: CPT | Mod: TXP

## 2019-10-10 PROCEDURE — 80048 BASIC METABOLIC PNL TOTAL CA: CPT | Mod: NTX

## 2019-10-10 PROCEDURE — 83880 ASSAY OF NATRIURETIC PEPTIDE: CPT | Mod: NTX

## 2019-10-17 ENCOUNTER — OFFICE VISIT (OUTPATIENT)
Dept: CARDIOLOGY | Facility: CLINIC | Age: 44
End: 2019-10-17
Payer: MEDICARE

## 2019-10-17 VITALS
WEIGHT: 179 LBS | SYSTOLIC BLOOD PRESSURE: 169 MMHG | DIASTOLIC BLOOD PRESSURE: 93 MMHG | HEIGHT: 65 IN | HEART RATE: 83 BPM | BODY MASS INDEX: 29.82 KG/M2

## 2019-10-17 DIAGNOSIS — N18.6 BENIGN HYPERTENSION WITH ESRD (END-STAGE RENAL DISEASE): ICD-10-CM

## 2019-10-17 DIAGNOSIS — I15.1 HYPERTENSION SECONDARY TO OTHER RENAL DISORDERS: ICD-10-CM

## 2019-10-17 DIAGNOSIS — I25.5 ISCHEMIC CARDIOMYOPATHY: ICD-10-CM

## 2019-10-17 DIAGNOSIS — I25.110 CORONARY ARTERY DISEASE INVOLVING NATIVE CORONARY ARTERY OF NATIVE HEART WITH UNSTABLE ANGINA PECTORIS: Primary | ICD-10-CM

## 2019-10-17 DIAGNOSIS — I12.0 BENIGN HYPERTENSION WITH ESRD (END-STAGE RENAL DISEASE): ICD-10-CM

## 2019-10-17 PROCEDURE — 99213 OFFICE O/P EST LOW 20 MIN: CPT | Mod: PBBFAC,PO,TXP | Performed by: INTERNAL MEDICINE

## 2019-10-17 PROCEDURE — 99214 OFFICE O/P EST MOD 30 MIN: CPT | Mod: S$PBB,NTX,, | Performed by: INTERNAL MEDICINE

## 2019-10-17 PROCEDURE — 99999 PR PBB SHADOW E&M-EST. PATIENT-LVL III: ICD-10-PCS | Mod: PBBFAC,TXP,, | Performed by: INTERNAL MEDICINE

## 2019-10-17 PROCEDURE — 99999 PR PBB SHADOW E&M-EST. PATIENT-LVL III: CPT | Mod: PBBFAC,TXP,, | Performed by: INTERNAL MEDICINE

## 2019-10-17 PROCEDURE — 99214 PR OFFICE/OUTPT VISIT, EST, LEVL IV, 30-39 MIN: ICD-10-PCS | Mod: S$PBB,NTX,, | Performed by: INTERNAL MEDICINE

## 2019-10-17 NOTE — PROGRESS NOTES
Subjective:    Patient ID:  Gilda Schroeder is a 44 y.o. female who presents for follow-up of ICM    HPI  She comes with no complaints, no chest pain, no shortness of breath  FC II    Review of Systems   Constitution: Negative for decreased appetite, malaise/fatigue, weight gain and weight loss.   Cardiovascular: Negative for chest pain, dyspnea on exertion, leg swelling, palpitations and syncope.   Respiratory: Negative for cough and shortness of breath.    Gastrointestinal: Negative.    Neurological: Negative for weakness.   All other systems reviewed and are negative.       Objective:      Physical Exam   Constitutional: She is oriented to person, place, and time. She appears well-developed and well-nourished.   HENT:   Head: Normocephalic.   Eyes: Pupils are equal, round, and reactive to light.   Neck: Normal range of motion. Neck supple. No JVD present. Carotid bruit is not present. No thyromegaly present.   Cardiovascular: Normal rate, regular rhythm, normal heart sounds, intact distal pulses and normal pulses. PMI is not displaced. Exam reveals no gallop.   No murmur heard.  Pulmonary/Chest: Effort normal and breath sounds normal.   Abdominal: Soft. Normal appearance. She exhibits no mass. There is no hepatosplenomegaly. There is no tenderness.   Musculoskeletal: Normal range of motion. She exhibits no edema.   Neurological: She is alert and oriented to person, place, and time. She has normal strength and normal reflexes. No sensory deficit.   Skin: Skin is warm and intact.   Psychiatric: She has a normal mood and affect.   Nursing note and vitals reviewed.    EF 40% with prior one at 35%      Assessment:       1. Coronary artery disease involving native coronary artery of native heart with unstable angina pectoris    2. Hypertension secondary to other renal disorders    3. Benign hypertension with ESRD (end-stage renal disease)    4. Ischemic cardiomyopathy         Plan:   Long discussion re:  ICD  Continue all cardiac medications  Regular exercise program  6 m f/u with [facility]

## 2019-10-31 ENCOUNTER — TELEPHONE (OUTPATIENT)
Dept: OBSTETRICS AND GYNECOLOGY | Facility: CLINIC | Age: 44
End: 2019-10-31

## 2019-10-31 NOTE — TELEPHONE ENCOUNTER
I called and spoke with patient after reviewing her chart. Patient did not have EMB with MD, due to severely elevated BP, and ESRD(unable to provide urine sample), per Dr. Henderson. Patient has appt scheduled for tomorrow with our office regarding same issue, (heavy bleeding). After speaking with MD, in the office today, I informed patient that we can do a hcg in the morning before appt in the afternoon. Patient stated that this would be fine, because she has to come to  anyway around 10am for a meeting. Patient is ok with this. Please approve stat hcg order.

## 2019-11-01 ENCOUNTER — OFFICE VISIT (OUTPATIENT)
Dept: OBSTETRICS AND GYNECOLOGY | Facility: CLINIC | Age: 44
End: 2019-11-01
Payer: MEDICARE

## 2019-11-01 ENCOUNTER — LAB VISIT (OUTPATIENT)
Dept: LAB | Facility: HOSPITAL | Age: 44
End: 2019-11-01
Attending: NURSE PRACTITIONER
Payer: MEDICARE

## 2019-11-01 VITALS
WEIGHT: 177.25 LBS | BODY MASS INDEX: 29.53 KG/M2 | DIASTOLIC BLOOD PRESSURE: 72 MMHG | HEIGHT: 65 IN | SYSTOLIC BLOOD PRESSURE: 162 MMHG

## 2019-11-01 DIAGNOSIS — N92.1 MENORRHAGIA WITH IRREGULAR CYCLE: ICD-10-CM

## 2019-11-01 DIAGNOSIS — R97.8 OTHER ABNORMAL TUMOR MARKERS: ICD-10-CM

## 2019-11-01 DIAGNOSIS — N92.1 MENORRHAGIA WITH IRREGULAR CYCLE: Primary | ICD-10-CM

## 2019-11-01 LAB — HCG INTACT+B SERPL-ACNC: <1.2 MIU/ML

## 2019-11-01 PROCEDURE — 36415 COLL VENOUS BLD VENIPUNCTURE: CPT | Mod: NTX

## 2019-11-01 PROCEDURE — 99999 PR PBB SHADOW E&M-EST. PATIENT-LVL III: ICD-10-PCS | Mod: PBBFAC,,, | Performed by: NURSE PRACTITIONER

## 2019-11-01 PROCEDURE — 99999 PR PBB SHADOW E&M-EST. PATIENT-LVL III: CPT | Mod: PBBFAC,,, | Performed by: NURSE PRACTITIONER

## 2019-11-01 PROCEDURE — 99214 OFFICE O/P EST MOD 30 MIN: CPT | Mod: S$PBB,,, | Performed by: NURSE PRACTITIONER

## 2019-11-01 PROCEDURE — 84702 CHORIONIC GONADOTROPIN TEST: CPT | Mod: NTX

## 2019-11-01 PROCEDURE — 99213 OFFICE O/P EST LOW 20 MIN: CPT | Mod: PBBFAC | Performed by: NURSE PRACTITIONER

## 2019-11-01 PROCEDURE — 99214 PR OFFICE/OUTPT VISIT, EST, LEVL IV, 30-39 MIN: ICD-10-PCS | Mod: S$PBB,,, | Performed by: NURSE PRACTITIONER

## 2019-11-01 RX ORDER — AMLODIPINE BESYLATE 10 MG/1
10 TABLET ORAL DAILY
COMMUNITY
Start: 2019-04-17 | End: 2022-07-10 | Stop reason: CLARIF

## 2019-11-01 RX ORDER — LIDOCAINE AND PRILOCAINE 25; 25 MG/G; MG/G
1 CREAM TOPICAL
Refills: 3 | Status: ON HOLD | COMMUNITY
Start: 2019-08-19 | End: 2020-11-25 | Stop reason: HOSPADM

## 2019-11-01 RX ORDER — CINACALCET 60 MG/1
60 TABLET, FILM COATED ORAL DAILY
Refills: 11 | Status: ON HOLD | COMMUNITY
Start: 2019-10-29 | End: 2020-11-25 | Stop reason: HOSPADM

## 2019-11-01 RX ORDER — ERGOCALCIFEROL 1.25 MG/1
50000 CAPSULE ORAL
Status: ON HOLD | COMMUNITY
End: 2020-11-25 | Stop reason: HOSPADM

## 2019-11-01 RX ORDER — SODIUM BICARBONATE 650 MG/1
650 TABLET ORAL 3 TIMES DAILY
Refills: 1 | Status: ON HOLD | COMMUNITY
Start: 2019-08-14 | End: 2020-11-25 | Stop reason: HOSPADM

## 2019-11-01 RX ORDER — MEGESTROL ACETATE 40 MG/ML
400 SUSPENSION ORAL DAILY PRN
Refills: 11 | Status: ON HOLD | COMMUNITY
Start: 2019-09-17 | End: 2020-07-02 | Stop reason: HOSPADM

## 2019-11-01 RX ORDER — CLONIDINE HYDROCHLORIDE 0.2 MG/1
0.2 TABLET ORAL
Status: ON HOLD | COMMUNITY
End: 2020-06-25 | Stop reason: CLARIF

## 2019-11-01 RX ORDER — HYDROXYZINE HYDROCHLORIDE 25 MG/1
TABLET, FILM COATED ORAL
Refills: 2 | COMMUNITY
Start: 2019-08-05 | End: 2019-11-08 | Stop reason: SDUPTHER

## 2019-11-01 RX ORDER — TRAZODONE HYDROCHLORIDE 100 MG/1
100 TABLET ORAL NIGHTLY
Refills: 5 | Status: ON HOLD | COMMUNITY
Start: 2019-10-24 | End: 2020-11-25 | Stop reason: HOSPADM

## 2019-11-01 RX ORDER — CALCIUM ACETATE 667 MG/1
1334 CAPSULE ORAL
COMMUNITY
Start: 2017-10-24 | End: 2020-11-11 | Stop reason: CLARIF

## 2019-11-01 RX ORDER — ONDANSETRON 4 MG/1
4 TABLET, FILM COATED ORAL EVERY 8 HOURS PRN
COMMUNITY
End: 2020-11-11 | Stop reason: CLARIF

## 2019-11-01 NOTE — PROGRESS NOTES
CC: Irregular cycles    Gilda Schroeder is a 44 y.o. female  presents for c/o irregular cycles. Patient has ERSF and is on transplant list. Patient was to follow-up for EMB last yr, for same issue and  did not come to visit. Patient denies pelvic pain. Patient reports that cycles have been heavy and irregular for past 12 months. Patient is sexually active, s/p BTL. Last pap exam was normal, .    Past Medical History:   Diagnosis Date    Allergy     morphine    Anemia     Anticoagulant long-term use     heparin    CAD (coronary artery disease)     stent x 2    Diabetes mellitus     Diabetes mellitus with ESRD (end-stage renal disease) 2016    Disorder of kidney and ureter     ESRD (end stage renal disease) on dialysis     Gastroesophageal reflux disease without esophagitis 1/10/2018    HTN (hypertension)     Obesity     Peritonitis associated with peritoneal dialysis 2017     Past Surgical History:   Procedure Laterality Date    AV FISTULA PLACEMENT       SECTION      ESOPHAGOGASTRODUODENOSCOPY N/A 3/14/2019    Procedure: EGD (ESOPHAGOGASTRODUODENOSCOPY);  Surgeon: Perry Washburn MD;  Location: [facility] ENDO;  Service: Endoscopy;  Laterality: N/A;    heart stent  2017    LEFT HEART CATHETERIZATION Right 3/16/2019    Procedure: Left heart cath;  Surgeon: Lucho Crowder MD;  Location: [facility] CATH;  Service: Cardiology;  Laterality: Right;    LEFT HEART CATHETERIZATION Left 6/10/2019    Procedure: Left heart cath, 330 pm start;  Surgeon: Qasim Guzman MD;  Location: [facility] CATH LAB;  Service: Cardiology;  Laterality: Left;    PERCUTANEOUS TRANSLUMINAL BALLOON ANGIOPLASTY OF CORONARY ARTERY  6/10/2019    Procedure: Angioplasty-coronary;  Surgeon: Qasim Guzman MD;  Location: [facility] CATH LAB;  Service: Cardiology;;    PLACEMENT PERITONEAL DIALYSIS CATHETER      PORTACATH PLACEMENT      for dialysis; removed    SKIN GRAFT  10/2017    TUBAL LIGATION    "    Social History     Socioeconomic History    Marital status:      Spouse name: Not on file    Number of children: Not on file    Years of education: Not on file    Highest education level: Not on file   Occupational History     Comment: unemployed   Social Needs    Financial resource strain: Not on file    Food insecurity:     Worry: Not on file     Inability: Not on file    Transportation needs:     Medical: Not on file     Non-medical: Not on file   Tobacco Use    Smoking status: Former Smoker     Packs/day: 0.50     Years: 12.00     Pack years: 6.00     Types: Cigarettes     Last attempt to quit:      Years since quittin.8   Substance and Sexual Activity    Alcohol use: No    Drug use: No    Sexual activity: Yes     Partners: Male   Lifestyle    Physical activity:     Days per week: Not on file     Minutes per session: Not on file    Stress: Not on file   Relationships    Social connections:     Talks on phone: Not on file     Gets together: Not on file     Attends Scientologist service: Not on file     Active member of club or organization: Not on file     Attends meetings of clubs or organizations: Not on file     Relationship status: Not on file   Other Topics Concern    Not on file   Social History Narrative    ** Merged History Encounter **          Family History   Problem Relation Age of Onset    Diabetes Mother     No Known Problems Father     No Known Problems Sister     No Known Problems Brother      OB History        4    Para   4    Term   3       1    AB   0    Living   1       SAB   0    TAB   0    Ectopic   0    Multiple        Live Births   1                 BP (!) 162/72 (BP Location: Right arm, Patient Position: Sitting, BP Method: Medium (Manual))   Ht 5' 5" (1.651 m)   Wt 80.4 kg (177 lb 4 oz)   LMP  (LMP Unknown)   BMI 29.50 kg/m²       ROS:  GENERAL: Denies weight gain or weight loss. Feeling well overall.   SKIN: Denies rash or lesions. " "  HEAD: Denies head injury or headache.   NODES: Denies enlarged lymph nodes.   CHEST: Denies chest pain or shortness of breath.   CARDIOVASCULAR: Denies palpitations or left sided chest pain.   ABDOMEN: No abdominal pain, constipation, diarrhea, nausea, vomiting or rectal bleeding.   URINARY: No frequency, dysuria, hematuria, or burning on urination.  REPRODUCTIVE: See HPI.     PHYSICAL EXAM:  APPEARANCE: Well nourished, well developed, in no acute distress.  AFFECT: WNL, alert and oriented x 3  PELVIC: Normal external genitalia without lesions.  Normal hair distribution.  Adequate perineal body, normal urethral meatus.  Vagina, scant bleeding seen with exam.  Cervix pink, without lesions, discharge or tenderness. Bimanual exam shows uterus to be normal size, regular, mobile and nontender.  Adnexa without masses or tenderness.     1. Menorrhagia with irregular cycle  CBC auto differential     PLAN:  Patient aware of the need for EMB. Wants to discuss a hysterectomy and " wait to see if EMB is truly needed with a hysterectomy".   Patient was referred to MD for Hysterectomy consult.   CBC today              "

## 2019-11-08 PROBLEM — R07.9 CHEST PAIN: Status: ACTIVE | Noted: 2019-11-08

## 2019-11-10 PROBLEM — I72.4 PSEUDOANEURYSM OF RIGHT FEMORAL ARTERY: Status: ACTIVE | Noted: 2019-11-10

## 2019-11-11 PROBLEM — I72.9 PSEUDOANEURYSM: Status: ACTIVE | Noted: 2019-11-11

## 2019-11-13 ENCOUNTER — OFFICE VISIT (OUTPATIENT)
Dept: OBSTETRICS AND GYNECOLOGY | Facility: CLINIC | Age: 44
End: 2019-11-13
Payer: MEDICARE

## 2019-11-13 VITALS — SYSTOLIC BLOOD PRESSURE: 144 MMHG | DIASTOLIC BLOOD PRESSURE: 76 MMHG | WEIGHT: 177.25 LBS | BODY MASS INDEX: 29.5 KG/M2

## 2019-11-13 DIAGNOSIS — N92.1 MENORRHAGIA WITH IRREGULAR CYCLE: Primary | ICD-10-CM

## 2019-11-13 PROBLEM — K29.00 ACUTE GASTRITIS WITHOUT HEMORRHAGE: Status: RESOLVED | Noted: 2019-03-16 | Resolved: 2019-11-13

## 2019-11-13 PROBLEM — J96.01 ACUTE RESPIRATORY FAILURE WITH HYPOXIA: Status: RESOLVED | Noted: 2019-06-09 | Resolved: 2019-11-13

## 2019-11-13 PROBLEM — N18.9 CHRONIC RENAL FAILURE: Status: RESOLVED | Noted: 2019-03-11 | Resolved: 2019-11-13

## 2019-11-13 PROBLEM — I21.4 NSTEMI (NON-ST ELEVATED MYOCARDIAL INFARCTION): Status: RESOLVED | Noted: 2019-01-16 | Resolved: 2019-11-13

## 2019-11-13 PROBLEM — E87.20 METABOLIC ACIDOSIS: Status: RESOLVED | Noted: 2018-10-15 | Resolved: 2019-11-13

## 2019-11-13 PROBLEM — I95.1 ORTHOSTATIC HYPOTENSION: Status: RESOLVED | Noted: 2019-06-11 | Resolved: 2019-11-13

## 2019-11-13 PROBLEM — T85.71XA PERITONEAL DIALYSIS-ASSOCIATED PERITONITIS: Status: RESOLVED | Noted: 2017-08-18 | Resolved: 2019-11-13

## 2019-11-13 PROBLEM — I72.9 PSEUDOANEURYSM: Status: RESOLVED | Noted: 2019-11-11 | Resolved: 2019-11-13

## 2019-11-13 PROBLEM — K31.84 GASTROPARESIS: Status: RESOLVED | Noted: 2018-08-31 | Resolved: 2019-11-13

## 2019-11-13 PROBLEM — I20.0 UNSTABLE ANGINA PECTORIS: Status: RESOLVED | Noted: 2017-12-12 | Resolved: 2019-11-13

## 2019-11-13 PROBLEM — R74.01 TRANSAMINITIS: Status: RESOLVED | Noted: 2019-01-15 | Resolved: 2019-11-13

## 2019-11-13 PROBLEM — R10.13 EPIGASTRIC PAIN: Status: RESOLVED | Noted: 2018-08-31 | Resolved: 2019-11-13

## 2019-11-13 PROBLEM — I16.9 HYPERTENSIVE CRISIS: Status: RESOLVED | Noted: 2019-06-09 | Resolved: 2019-11-13

## 2019-11-13 PROBLEM — E87.70 VOLUME OVERLOAD: Status: RESOLVED | Noted: 2019-03-11 | Resolved: 2019-11-13

## 2019-11-13 PROBLEM — R00.2 PALPITATIONS: Status: RESOLVED | Noted: 2018-01-10 | Resolved: 2019-11-13

## 2019-11-13 PROBLEM — R00.0 SINUS TACHYCARDIA: Status: RESOLVED | Noted: 2018-01-11 | Resolved: 2019-11-13

## 2019-11-13 PROBLEM — I16.0 HYPERTENSIVE URGENCY: Status: RESOLVED | Noted: 2018-08-31 | Resolved: 2019-11-13

## 2019-11-13 PROBLEM — R79.89 ELEVATED TROPONIN: Status: RESOLVED | Noted: 2017-12-10 | Resolved: 2019-11-13

## 2019-11-13 PROBLEM — R11.0 NAUSEA: Status: RESOLVED | Noted: 2019-03-11 | Resolved: 2019-11-13

## 2019-11-13 PROBLEM — N18.6 ESRD (END STAGE RENAL DISEASE): Status: RESOLVED | Noted: 2017-08-18 | Resolved: 2019-11-13

## 2019-11-13 PROBLEM — R06.02 SOB (SHORTNESS OF BREATH): Status: RESOLVED | Noted: 2017-12-12 | Resolved: 2019-11-13

## 2019-11-13 PROBLEM — R07.9 CHEST PAIN: Status: RESOLVED | Noted: 2019-11-08 | Resolved: 2019-11-13

## 2019-11-13 PROBLEM — R10.9 ABDOMINAL PAIN: Status: RESOLVED | Noted: 2018-01-11 | Resolved: 2019-11-13

## 2019-11-13 PROCEDURE — 88305 TISSUE EXAM BY PATHOLOGIST: CPT | Performed by: PATHOLOGY

## 2019-11-13 PROCEDURE — 99213 OFFICE O/P EST LOW 20 MIN: CPT | Mod: 25,S$PBB,, | Performed by: OBSTETRICS & GYNECOLOGY

## 2019-11-13 PROCEDURE — 99999 PR PBB SHADOW E&M-EST. PATIENT-LVL IV: CPT | Mod: PBBFAC,,, | Performed by: OBSTETRICS & GYNECOLOGY

## 2019-11-13 PROCEDURE — 88305 TISSUE EXAM BY PATHOLOGIST: ICD-10-PCS | Mod: 26,,, | Performed by: PATHOLOGY

## 2019-11-13 PROCEDURE — 99999 PR PBB SHADOW E&M-EST. PATIENT-LVL IV: ICD-10-PCS | Mod: PBBFAC,,, | Performed by: OBSTETRICS & GYNECOLOGY

## 2019-11-13 PROCEDURE — 88305 TISSUE EXAM BY PATHOLOGIST: CPT | Mod: 26,,, | Performed by: PATHOLOGY

## 2019-11-13 PROCEDURE — 99214 OFFICE O/P EST MOD 30 MIN: CPT | Mod: PBBFAC,25 | Performed by: OBSTETRICS & GYNECOLOGY

## 2019-11-13 PROCEDURE — 99213 PR OFFICE/OUTPT VISIT, EST, LEVL III, 20-29 MIN: ICD-10-PCS | Mod: 25,S$PBB,, | Performed by: OBSTETRICS & GYNECOLOGY

## 2019-11-13 PROCEDURE — 96372 THER/PROPH/DIAG INJ SC/IM: CPT | Mod: PBBFAC

## 2019-11-13 RX ORDER — MEDROXYPROGESTERONE ACETATE 150 MG/ML
150 INJECTION, SUSPENSION INTRAMUSCULAR
Status: ACTIVE | OUTPATIENT
Start: 2019-11-13 | End: 2021-02-05

## 2019-11-13 RX ADMIN — MEDROXYPROGESTERONE ACETATE 150 MG: 150 INJECTION, SUSPENSION INTRAMUSCULAR at 02:11

## 2019-11-13 NOTE — PROGRESS NOTES
Depo provera 150mg given IM to right ventrogluteal.  Pt tolerated well.  Pt advised to wait 10-15 minutes for s/s of medication reaction.  Appt made for next injection on 1/29/2020 at 1:30pm at [facility] location, per pt request.  Pt voiced understanding.  ROB OSORIO

## 2019-11-13 NOTE — PROGRESS NOTES
Subjective:       Patient ID: Gilda Schroeder is a 44 y.o. female.    Chief Complaint:  Consult (hyst)      History of Present Illness  HPI  Continues to have heavy menses with some irregular bleeding   Anemic from ESRD already   No attempt to control hormonally.   Embx planned in 2018, not done.   Ready to discuss treatment options  Reviewed medical record.  History of DM, CAD, ESRD on plavix.   Not good candidate for hysterectomy   Recommend embx today, trial of Depo Provera  Prior to endometrial ablation or hysterectomy in the future     Health Maintenance   Topic Date Due    Foot Exam  1986    Eye Exam  1986    Mammogram  2019    Hemoglobin A1c  12/10/2019    Lipid Panel  10/10/2020    Pap Smear with HPV Cotest  2021    Pneumococcal Vaccine (Highest Risk) (3 of 3 - PPSV23) 2021    TETANUS VACCINE  2026     GYN & OB History  Patient's last menstrual period was 2019 (lmp unknown).   Date of Last Pap: 2/15/2018    OB History    Para Term  AB Living   4 4 3 1 0 1   SAB TAB Ectopic Multiple Live Births   0 0 0   1      # Outcome Date GA Lbr Julien/2nd Weight Sex Delivery Anes PTL Lv   4   34w0d   F CS-Unspec   SALLY   3 Term      CS-Unspec      2 Term      CS-Unspec      1 Term      Vag-Spont          Review of Systems  Review of Systems        Objective:   Physical Exam:             Abdominal: Soft. Bowel sounds are normal. She exhibits no distension, no mass and no abdominal incision. There is no tenderness. There is no guarding. No hernia. Hernia confirmed negative in the right inguinal area and confirmed negative in the left inguinal area.     Genitourinary: Rectum normal, vagina normal and uterus normal. There is no rash, tenderness or lesion on the right labia. There is no rash, tenderness or lesion on the left labia. Uterus is not deviated, not enlarged, not tender, not hosting fibroids and not experiencing uterine prolapse. Cervix  is normal. Right adnexum displays no mass, no tenderness and no fullness. Left adnexum displays no mass, no tenderness and no fullness. No tenderness, bleeding, rectocele, cystocele or unspecified prolapse of vaginal walls in the vagina. No foreign body in the vagina. No signs of injury around the vagina. No vaginal discharge found. Cervix exhibits no motion tenderness, no discharge and no friability.              Lymphadenopathy:        Right: No inguinal adenopathy present.        Left: No inguinal adenopathy present.         Endometrial biopsy    Verbal consent for biopsy done, explained risk of uterine perforation and level of discomfort from the procedure. No alternative available.     Cervix visualized,   Anterior cervical lip grasped with tenaculum   Pipelle passed to 6 cm without resistance   Adequate tissue removed and sent to pathology for evaluation  Instruments remove  Less than 1 cc blood loss     Patient tolerated the procedure well.   Assessment:        1. Menorrhagia with irregular cycle                Plan:            Gilda was seen today for consult.    Diagnoses and all orders for this visit:    Menorrhagia with irregular cycle  Comments:  Start therapy with trial of Depo Provera, if no improvement proceed with endometrial ablation if biopsy benign and ultrasound normal   Orders:  -     US Pelvis Complete Non OB; Future  -     Specimen to Pathology, Ob/Gyn  -     Endometrial biopsy; Future  -     medroxyPROGESTERone (DEPO-PROVERA) injection 150 mg

## 2019-11-18 LAB
FINAL PATHOLOGIC DIAGNOSIS: NORMAL
GROSS: NORMAL

## 2019-11-20 VITALS
TEMPERATURE: 99 F | SYSTOLIC BLOOD PRESSURE: 177 MMHG | OXYGEN SATURATION: 100 % | HEART RATE: 91 BPM | RESPIRATION RATE: 17 BRPM | DIASTOLIC BLOOD PRESSURE: 66 MMHG

## 2019-11-20 PROBLEM — R07.9 CHEST PAIN: Status: ACTIVE | Noted: 2019-11-20

## 2019-11-21 VITALS
RESPIRATION RATE: 15 BRPM | SYSTOLIC BLOOD PRESSURE: 170 MMHG | HEART RATE: 112 BPM | DIASTOLIC BLOOD PRESSURE: 77 MMHG | TEMPERATURE: 99 F | OXYGEN SATURATION: 96 %

## 2019-11-21 VITALS
DIASTOLIC BLOOD PRESSURE: 106 MMHG | RESPIRATION RATE: 16 BRPM | HEART RATE: 104 BPM | SYSTOLIC BLOOD PRESSURE: 184 MMHG | OXYGEN SATURATION: 98 % | TEMPERATURE: 99 F

## 2019-11-21 VITALS
DIASTOLIC BLOOD PRESSURE: 88 MMHG | RESPIRATION RATE: 12 BRPM | TEMPERATURE: 99 F | OXYGEN SATURATION: 98 % | HEART RATE: 122 BPM | SYSTOLIC BLOOD PRESSURE: 193 MMHG

## 2019-11-22 VITALS
RESPIRATION RATE: 16 BRPM | TEMPERATURE: 99 F | DIASTOLIC BLOOD PRESSURE: 85 MMHG | OXYGEN SATURATION: 95 % | SYSTOLIC BLOOD PRESSURE: 188 MMHG | HEART RATE: 123 BPM

## 2019-11-22 VITALS
SYSTOLIC BLOOD PRESSURE: 121 MMHG | TEMPERATURE: 99 F | DIASTOLIC BLOOD PRESSURE: 69 MMHG | OXYGEN SATURATION: 97 % | HEART RATE: 78 BPM | RESPIRATION RATE: 16 BRPM

## 2019-11-22 DIAGNOSIS — Z76.82 ORGAN TRANSPLANT CANDIDATE: Primary | ICD-10-CM

## 2019-11-26 ENCOUNTER — TELEPHONE (OUTPATIENT)
Dept: RADIOLOGY | Facility: HOSPITAL | Age: 44
End: 2019-11-26

## 2019-12-16 ENCOUNTER — TELEPHONE (OUTPATIENT)
Dept: RADIOLOGY | Facility: HOSPITAL | Age: 44
End: 2019-12-16

## 2020-02-13 DIAGNOSIS — Z76.82 ORGAN TRANSPLANT CANDIDATE: Primary | ICD-10-CM

## 2020-02-27 ENCOUNTER — TELEPHONE (OUTPATIENT)
Dept: TRANSPLANT | Facility: CLINIC | Age: 45
End: 2020-02-27

## 2020-02-27 NOTE — TELEPHONE ENCOUNTER
----- Message from Roxann Oliva sent at 2/27/2020  3:08 PM CST -----  Contact: Molly at [facility]   Calling to see if the PT has an upcoming yearly appointment.   Also said the PT keeps sending the vials of blood every month and isn't sure if they should keep sending them since she is inactive as 7/2/19.     Callback [phone] ext [phone]

## 2020-02-27 NOTE — TELEPHONE ENCOUNTER
Returned call and informed Molly that patient has an appointment for echocardiogram on 4/20/2020 and her Cardiologist on 4/28/2020. I informed her that pateint is on the list and is currently inactive until cleared by her cardiologist. I instructed her to continue with the HLA collection since her appointment is coming up soon. She will then be scheduled to come to transplant clinic. I informed her that patient can call with any questions or concerns.  She verbalized understanding of all information discussed.    ---- Message from Roxann Oliva sent at 2/27/2020  3:08 PM CST -----  Contact: Molly at [facility]   Calling to see if the PT has an upcoming yearly appointment.   Also said the PT keeps sending the vials of blood every month and isn't sure if they should keep sending them since she is inactive as 7/2/19.     Callback [phone] ext [phone]

## 2020-05-21 ENCOUNTER — CLINICAL SUPPORT (OUTPATIENT)
Dept: CARDIOLOGY | Facility: CLINIC | Age: 45
End: 2020-05-21
Attending: INTERNAL MEDICINE
Payer: MEDICARE

## 2020-05-21 VITALS — BODY MASS INDEX: 28.82 KG/M2 | HEIGHT: 65 IN | WEIGHT: 173 LBS

## 2020-05-21 DIAGNOSIS — N18.6 BENIGN HYPERTENSION WITH ESRD (END-STAGE RENAL DISEASE): ICD-10-CM

## 2020-05-21 DIAGNOSIS — I15.1 HYPERTENSION SECONDARY TO OTHER RENAL DISORDERS: ICD-10-CM

## 2020-05-21 DIAGNOSIS — I25.5 ISCHEMIC CARDIOMYOPATHY: ICD-10-CM

## 2020-05-21 DIAGNOSIS — I25.110 CORONARY ARTERY DISEASE INVOLVING NATIVE CORONARY ARTERY OF NATIVE HEART WITH UNSTABLE ANGINA PECTORIS: ICD-10-CM

## 2020-05-21 DIAGNOSIS — I12.0 BENIGN HYPERTENSION WITH ESRD (END-STAGE RENAL DISEASE): ICD-10-CM

## 2020-05-21 PROCEDURE — 93306 ECHO (CUPID ONLY): ICD-10-PCS | Mod: 26,S$PBB,NTX, | Performed by: INTERNAL MEDICINE

## 2020-05-21 PROCEDURE — 99211 OFF/OP EST MAY X REQ PHY/QHP: CPT | Mod: PBBFAC,PO,25,NTX

## 2020-05-21 PROCEDURE — 99999 PR PBB SHADOW E&M-EST. PATIENT-LVL I: ICD-10-PCS | Mod: PBBFAC,TXP,,

## 2020-05-21 PROCEDURE — 99999 PR PBB SHADOW E&M-EST. PATIENT-LVL I: CPT | Mod: PBBFAC,TXP,,

## 2020-05-21 PROCEDURE — 93306 TTE W/DOPPLER COMPLETE: CPT | Mod: PBBFAC,PO,NTX | Performed by: INTERNAL MEDICINE

## 2020-05-22 LAB
ASCENDING AORTA: 2.6 CM
AV INDEX (PROSTH): 0.75
AV MEAN GRADIENT: 5 MMHG
AV PEAK GRADIENT: 10 MMHG
AV VALVE AREA: 2.69 CM2
AV VELOCITY RATIO: 0.66
BSA FOR ECHO PROCEDURE: 1.9 M2
CV ECHO LV RWT: 0.49 CM
DOP CALC AO PEAK VEL: 1.58 M/S
DOP CALC AO VTI: 31.44 CM
DOP CALC LVOT AREA: 3.6 CM2
DOP CALC LVOT DIAMETER: 2.14 CM
DOP CALC LVOT PEAK VEL: 1.05 M/S
DOP CALC LVOT STROKE VOLUME: 84.66 CM3
DOP CALCLVOT PEAK VEL VTI: 23.55 CM
E WAVE DECELERATION TIME: 162.19 MSEC
E/A RATIO: 1.2
E/E' RATIO: 21.4 M/S
ECHO LV POSTERIOR WALL: 1.2 CM (ref 0.6–1.1)
FRACTIONAL SHORTENING: 25 % (ref 28–44)
INTERVENTRICULAR SEPTUM: 1.25 CM (ref 0.6–1.1)
IVRT: 64.7 MSEC
LA MAJOR: 4.73 CM
LA MINOR: 4.53 CM
LA WIDTH: 4.03 CM
LEFT ATRIUM SIZE: 4.08 CM
LEFT ATRIUM VOLUME INDEX: 34.8 ML/M2
LEFT ATRIUM VOLUME: 64.68 CM3
LEFT INTERNAL DIMENSION IN SYSTOLE: 3.64 CM (ref 2.1–4)
LEFT VENTRICLE DIASTOLIC VOLUME INDEX: 60.17 ML/M2
LEFT VENTRICLE DIASTOLIC VOLUME: 111.9 ML
LEFT VENTRICLE MASS INDEX: 125 G/M2
LEFT VENTRICLE SYSTOLIC VOLUME INDEX: 30 ML/M2
LEFT VENTRICLE SYSTOLIC VOLUME: 55.75 ML
LEFT VENTRICULAR INTERNAL DIMENSION IN DIASTOLE: 4.88 CM (ref 3.5–6)
LEFT VENTRICULAR MASS: 231.58 G
LV LATERAL E/E' RATIO: 21.4 M/S
LV SEPTAL E/E' RATIO: 21.4 M/S
MV PEAK A VEL: 0.89 M/S
MV PEAK E VEL: 1.07 M/S
PULM VEIN S/D RATIO: 0.86
PV PEAK D VEL: 0.72 M/S
PV PEAK S VEL: 0.62 M/S
RA MAJOR: 3.73 CM
RA PRESSURE: 3 MMHG
RA WIDTH: 3.44 CM
RIGHT VENTRICULAR END-DIASTOLIC DIMENSION: 4.17 CM
SINUS: 2.79 CM
STJ: 2.71 CM
TDI LATERAL: 0.05 M/S
TDI SEPTAL: 0.05 M/S
TDI: 0.05 M/S
TRICUSPID ANNULAR PLANE SYSTOLIC EXCURSION: 2.21 CM

## 2020-06-16 ENCOUNTER — TELEPHONE (OUTPATIENT)
Dept: TRANSPLANT | Facility: CLINIC | Age: 45
End: 2020-06-16

## 2020-06-16 ENCOUNTER — OFFICE VISIT (OUTPATIENT)
Dept: CARDIOLOGY | Facility: CLINIC | Age: 45
End: 2020-06-16
Attending: INTERNAL MEDICINE
Payer: MEDICARE

## 2020-06-16 VITALS
HEIGHT: 65 IN | DIASTOLIC BLOOD PRESSURE: 74 MMHG | WEIGHT: 158.5 LBS | HEART RATE: 74 BPM | BODY MASS INDEX: 26.41 KG/M2 | SYSTOLIC BLOOD PRESSURE: 150 MMHG

## 2020-06-16 DIAGNOSIS — N18.6 BENIGN HYPERTENSION WITH ESRD (END-STAGE RENAL DISEASE): ICD-10-CM

## 2020-06-16 DIAGNOSIS — I12.0 BENIGN HYPERTENSION WITH ESRD (END-STAGE RENAL DISEASE): ICD-10-CM

## 2020-06-16 DIAGNOSIS — I25.5 ISCHEMIC CARDIOMYOPATHY: ICD-10-CM

## 2020-06-16 DIAGNOSIS — I25.10 CORONARY ARTERY DISEASE INVOLVING NATIVE CORONARY ARTERY OF NATIVE HEART WITHOUT ANGINA PECTORIS: Primary | ICD-10-CM

## 2020-06-16 DIAGNOSIS — E78.2 MIXED HYPERLIPIDEMIA: ICD-10-CM

## 2020-06-16 PROCEDURE — 99214 OFFICE O/P EST MOD 30 MIN: CPT | Mod: S$PBB,NTX,, | Performed by: INTERNAL MEDICINE

## 2020-06-16 PROCEDURE — 99214 PR OFFICE/OUTPT VISIT, EST, LEVL IV, 30-39 MIN: ICD-10-PCS | Mod: S$PBB,NTX,, | Performed by: INTERNAL MEDICINE

## 2020-06-16 PROCEDURE — 99214 OFFICE O/P EST MOD 30 MIN: CPT | Mod: PBBFAC,PO,NTX | Performed by: INTERNAL MEDICINE

## 2020-06-16 PROCEDURE — 99999 PR PBB SHADOW E&M-EST. PATIENT-LVL IV: ICD-10-PCS | Mod: PBBFAC,TXP,, | Performed by: INTERNAL MEDICINE

## 2020-06-16 PROCEDURE — 99999 PR PBB SHADOW E&M-EST. PATIENT-LVL IV: CPT | Mod: PBBFAC,TXP,, | Performed by: INTERNAL MEDICINE

## 2020-06-16 NOTE — PROGRESS NOTES
Subjective:    Patient ID:  Gilda Schroeder is a 45 y.o. female who presents for follow-up of Coronary Artery Disease (ECHO)      HPI  She comes with no complaints, no chest pain, no shortness of breath  Wants to stop brillinta to get back on active transplant list      Review of Systems   Constitution: Negative for decreased appetite, malaise/fatigue, weight gain and weight loss.   Cardiovascular: Negative for chest pain, dyspnea on exertion, leg swelling, palpitations and syncope.   Respiratory: Negative for cough and shortness of breath.    Gastrointestinal: Negative.    Neurological: Negative for weakness.   All other systems reviewed and are negative.       Objective:      Physical Exam   Constitutional: She is oriented to person, place, and time. She appears well-developed and well-nourished.   HENT:   Head: Normocephalic.   Eyes: Pupils are equal, round, and reactive to light.   Neck: Normal range of motion. Neck supple. No JVD present. Carotid bruit is not present. No thyromegaly present.   Cardiovascular: Normal rate, regular rhythm, normal heart sounds, intact distal pulses and normal pulses. PMI is not displaced. Exam reveals no gallop.   No murmur heard.  Pulmonary/Chest: Effort normal and breath sounds normal.   Abdominal: Soft. Normal appearance. She exhibits no mass. There is no hepatosplenomegaly. There is no abdominal tenderness.   Musculoskeletal: Normal range of motion.         General: No edema.   Neurological: She is alert and oriented to person, place, and time. She has normal strength and normal reflexes. No sensory deficit.   Skin: Skin is warm and intact.   Psychiatric: She has a normal mood and affect.   Nursing note and vitals reviewed.        Assessment:       1. Coronary artery disease involving native coronary artery of native heart without angina pectoris    2. Ischemic cardiomyopathy    3. Mixed hyperlipidemia    4. Benign hypertension with ESRD (end-stage renal disease)          Plan:   Stop brillinta  Continue all cardiac medications  Regular exercise program  Weight loss  6 m f/u

## 2020-06-16 NOTE — TELEPHONE ENCOUNTER
Notified by  at dialysis unit that patient's  lost employment so patient has Medicare primary, Medicaid secondary, and currently [facility] is covering prescription coverage through 6/30 then Part D covers effective 7/1/20.

## 2020-06-18 DIAGNOSIS — Z12.31 ENCOUNTER FOR SCREENING MAMMOGRAM FOR MALIGNANT NEOPLASM OF BREAST: ICD-10-CM

## 2020-06-18 DIAGNOSIS — Z76.82 ORGAN TRANSPLANT CANDIDATE: Primary | ICD-10-CM

## 2020-06-24 ENCOUNTER — TELEPHONE (OUTPATIENT)
Dept: TRANSPLANT | Facility: CLINIC | Age: 45
End: 2020-06-24

## 2020-06-25 PROBLEM — I20.9 ANGINA PECTORIS, UNSPECIFIED: Status: ACTIVE | Noted: 2019-11-20

## 2020-06-26 ENCOUNTER — TELEPHONE (OUTPATIENT)
Dept: CARDIOLOGY | Facility: CLINIC | Age: 45
End: 2020-06-26

## 2020-06-26 NOTE — TELEPHONE ENCOUNTER
----- Message from Keiry Choi MA sent at 6/26/2020  1:23 PM CDT -----  Type:  Sooner Apoointment Request    Caller is requesting a sooner appointment.      Name of Caller:  [facility] - [facility] nurse  When is the first available appointment?  After November  Symptoms:  hosp f/u  Best Call Back Number:    Additional Information:

## 2020-06-27 PROBLEM — I21.4 NON-ST ELEVATION MI (NSTEMI): Status: ACTIVE | Noted: 2019-11-20

## 2020-06-30 PROBLEM — E16.2 HYPOGLYCEMIA: Status: ACTIVE | Noted: 2020-06-30

## 2020-07-01 PROBLEM — T68.XXXA HYPOTHERMIA: Status: ACTIVE | Noted: 2020-07-01

## 2020-07-01 PROBLEM — R78.81 GRAM-POSITIVE COCCI BACTEREMIA: Status: ACTIVE | Noted: 2020-07-01

## 2020-07-02 PROBLEM — R79.9 CONTAMINATION OF BLOOD CULTURE: Status: ACTIVE | Noted: 2020-07-01

## 2020-07-02 PROBLEM — B95.7 COAG NEGATIVE STAPHYLOCOCCUS BACTEREMIA: Status: ACTIVE | Noted: 2020-07-01

## 2020-07-08 ENCOUNTER — OFFICE VISIT (OUTPATIENT)
Dept: CARDIOLOGY | Facility: CLINIC | Age: 45
End: 2020-07-08
Payer: MEDICARE

## 2020-07-08 VITALS
HEART RATE: 75 BPM | WEIGHT: 155 LBS | DIASTOLIC BLOOD PRESSURE: 58 MMHG | BODY MASS INDEX: 25.83 KG/M2 | HEIGHT: 65 IN | SYSTOLIC BLOOD PRESSURE: 102 MMHG

## 2020-07-08 DIAGNOSIS — I12.0 BENIGN HYPERTENSION WITH ESRD (END-STAGE RENAL DISEASE): ICD-10-CM

## 2020-07-08 DIAGNOSIS — I25.5 ISCHEMIC CARDIOMYOPATHY: Primary | ICD-10-CM

## 2020-07-08 DIAGNOSIS — I25.10 CORONARY ARTERY DISEASE INVOLVING NATIVE CORONARY ARTERY OF NATIVE HEART WITHOUT ANGINA PECTORIS: ICD-10-CM

## 2020-07-08 DIAGNOSIS — N18.6 BENIGN HYPERTENSION WITH ESRD (END-STAGE RENAL DISEASE): ICD-10-CM

## 2020-07-08 DIAGNOSIS — E78.2 MIXED HYPERLIPIDEMIA: ICD-10-CM

## 2020-07-08 PROCEDURE — 99214 PR OFFICE/OUTPT VISIT, EST, LEVL IV, 30-39 MIN: ICD-10-PCS | Mod: S$PBB,NTX,, | Performed by: INTERNAL MEDICINE

## 2020-07-08 PROCEDURE — 99214 OFFICE O/P EST MOD 30 MIN: CPT | Mod: S$PBB,NTX,, | Performed by: INTERNAL MEDICINE

## 2020-07-08 PROCEDURE — 99999 PR PBB SHADOW E&M-EST. PATIENT-LVL IV: ICD-10-PCS | Mod: PBBFAC,TXP,, | Performed by: INTERNAL MEDICINE

## 2020-07-08 PROCEDURE — 99999 PR PBB SHADOW E&M-EST. PATIENT-LVL IV: CPT | Mod: PBBFAC,TXP,, | Performed by: INTERNAL MEDICINE

## 2020-07-08 PROCEDURE — 99214 OFFICE O/P EST MOD 30 MIN: CPT | Mod: PBBFAC,PO,TXP | Performed by: INTERNAL MEDICINE

## 2020-07-08 NOTE — PROGRESS NOTES
Subjective:    Patient ID:  Gilda Schroeder is a 45 y.o. female who presents for follow-up of ICM    HPI    Admitted to [facility] last month with ACS. Ended up getting FIDE to LAD (mid and distal)  She comes with no complaints, no chest pain, no shortness of breath      Review of Systems   Constitution: Negative for decreased appetite, malaise/fatigue, weight gain and weight loss.   Cardiovascular: Negative for chest pain, dyspnea on exertion, leg swelling, palpitations and syncope.   Respiratory: Negative for cough and shortness of breath.    Gastrointestinal: Negative.    Neurological: Negative for weakness.   All other systems reviewed and are negative.       Objective:      Physical Exam   Constitutional: She is oriented to person, place, and time. She appears well-developed and well-nourished.   HENT:   Head: Normocephalic.   Eyes: Pupils are equal, round, and reactive to light.   Neck: Normal range of motion. Neck supple. No JVD present. Carotid bruit is not present. No thyromegaly present.   Cardiovascular: Normal rate, regular rhythm, normal heart sounds, intact distal pulses and normal pulses. PMI is not displaced. Exam reveals no gallop.   No murmur heard.  Pulmonary/Chest: Effort normal and breath sounds normal.   Abdominal: Soft. Normal appearance. She exhibits no mass. There is no hepatosplenomegaly. There is no abdominal tenderness.   Musculoskeletal: Normal range of motion.         General: No edema.   Neurological: She is alert and oriented to person, place, and time. She has normal strength and normal reflexes. No sensory deficit.   Skin: Skin is warm and intact.   Psychiatric: She has a normal mood and affect.   Nursing note and vitals reviewed.        Assessment:       1. Ischemic cardiomyopathy    2. Benign hypertension with ESRD (end-stage renal disease)    3. Mixed hyperlipidemia    4. Coronary artery disease involving native coronary artery of native heart without angina pectoris          Plan:     Continue all cardiac medications  Regular exercise program  6 m f/u

## 2020-07-09 ENCOUNTER — HOSPITAL ENCOUNTER (OUTPATIENT)
Dept: RADIOLOGY | Facility: HOSPITAL | Age: 45
Discharge: HOME | End: 2020-07-09
Attending: NURSE PRACTITIONER
Payer: MEDICARE

## 2020-07-09 DIAGNOSIS — Z76.82 ORGAN TRANSPLANT CANDIDATE: ICD-10-CM

## 2020-07-09 PROCEDURE — 72170 X-RAY EXAM OF PELVIS: CPT | Mod: TC,FY,PO,TXP

## 2020-07-09 PROCEDURE — 71046 X-RAY EXAM CHEST 2 VIEWS: CPT | Mod: 26,TXP,, | Performed by: RADIOLOGY

## 2020-07-09 PROCEDURE — 72170 XR PELVIS ROUTINE AP: ICD-10-PCS | Mod: 26,TXP,, | Performed by: RADIOLOGY

## 2020-07-09 PROCEDURE — 76770 US EXAM ABDO BACK WALL COMP: CPT | Mod: TC,PO,TXP

## 2020-07-09 PROCEDURE — 71046 X-RAY EXAM CHEST 2 VIEWS: CPT | Mod: TC,FY,PO,TXP

## 2020-07-09 PROCEDURE — 76770 US RETROPERITONEAL COMPLETE: ICD-10-PCS | Mod: 26,TXP,, | Performed by: RADIOLOGY

## 2020-07-09 PROCEDURE — 72170 X-RAY EXAM OF PELVIS: CPT | Mod: 26,TXP,, | Performed by: RADIOLOGY

## 2020-07-09 PROCEDURE — 71046 XR CHEST PA AND LATERAL: ICD-10-PCS | Mod: 26,TXP,, | Performed by: RADIOLOGY

## 2020-07-09 PROCEDURE — 76770 US EXAM ABDO BACK WALL COMP: CPT | Mod: 26,TXP,, | Performed by: RADIOLOGY

## 2020-07-16 ENCOUNTER — HOSPITAL ENCOUNTER (OUTPATIENT)
Dept: RADIOLOGY | Facility: HOSPITAL | Age: 45
Discharge: HOME | End: 2020-07-16
Attending: NURSE PRACTITIONER
Payer: MEDICARE

## 2020-07-16 ENCOUNTER — OFFICE VISIT (OUTPATIENT)
Dept: TRANSPLANT | Facility: CLINIC | Age: 45
End: 2020-07-16
Payer: MEDICARE

## 2020-07-16 ENCOUNTER — TELEPHONE (OUTPATIENT)
Dept: TRANSPLANT | Facility: CLINIC | Age: 45
End: 2020-07-16

## 2020-07-16 VITALS
OXYGEN SATURATION: 100 % | DIASTOLIC BLOOD PRESSURE: 71 MMHG | SYSTOLIC BLOOD PRESSURE: 149 MMHG | HEIGHT: 64 IN | HEART RATE: 72 BPM | BODY MASS INDEX: 26 KG/M2 | RESPIRATION RATE: 16 BRPM | WEIGHT: 152.31 LBS | TEMPERATURE: 99 F

## 2020-07-16 DIAGNOSIS — Z76.82 ORGAN TRANSPLANT CANDIDATE: ICD-10-CM

## 2020-07-16 DIAGNOSIS — I25.10 CORONARY ARTERY DISEASE INVOLVING NATIVE CORONARY ARTERY OF NATIVE HEART WITHOUT ANGINA PECTORIS: ICD-10-CM

## 2020-07-16 DIAGNOSIS — Z79.01 ANTICOAGULANT LONG-TERM USE: ICD-10-CM

## 2020-07-16 DIAGNOSIS — Z12.31 ENCOUNTER FOR SCREENING MAMMOGRAM FOR MALIGNANT NEOPLASM OF BREAST: ICD-10-CM

## 2020-07-16 DIAGNOSIS — N18.6 ESRD (END STAGE RENAL DISEASE): Primary | ICD-10-CM

## 2020-07-16 DIAGNOSIS — E11.21 DIABETIC NEPHROPATHY ASSOCIATED WITH TYPE 2 DIABETES MELLITUS: ICD-10-CM

## 2020-07-16 PROCEDURE — 99215 OFFICE O/P EST HI 40 MIN: CPT | Mod: S$PBB,TXP,, | Performed by: INTERNAL MEDICINE

## 2020-07-16 PROCEDURE — 77067 SCR MAMMO BI INCL CAD: CPT | Mod: TC,TXP

## 2020-07-16 PROCEDURE — 99999 PR PBB SHADOW E&M-EST. PATIENT-LVL V: ICD-10-PCS | Mod: PBBFAC,TXP,, | Performed by: INTERNAL MEDICINE

## 2020-07-16 PROCEDURE — 77067 SCR MAMMO BI INCL CAD: CPT | Mod: 26,TXP,, | Performed by: RADIOLOGY

## 2020-07-16 PROCEDURE — 99215 PR OFFICE/OUTPT VISIT, EST, LEVL V, 40-54 MIN: ICD-10-PCS | Mod: S$PBB,TXP,, | Performed by: INTERNAL MEDICINE

## 2020-07-16 PROCEDURE — 99999 PR PBB SHADOW E&M-EST. PATIENT-LVL V: CPT | Mod: PBBFAC,TXP,, | Performed by: INTERNAL MEDICINE

## 2020-07-16 PROCEDURE — 99215 OFFICE O/P EST HI 40 MIN: CPT | Mod: PBBFAC,TXP | Performed by: INTERNAL MEDICINE

## 2020-07-16 PROCEDURE — 77067 MAMMO DIGITAL SCREENING BILAT WITH CAD: ICD-10-PCS | Mod: 26,TXP,, | Performed by: RADIOLOGY

## 2020-07-16 NOTE — TELEPHONE ENCOUNTER
----- Message from Brett Jaffe MD sent at 7/16/2020  1:44 PM CDT -----  Please review CT of the abdomen with surgery for vascular calcification  She is to stay active due to recent MI, stent to the LAD and need to complete in interruptive plavix until June 2021

## 2020-07-16 NOTE — LETTER
July 18, 2020        Roselia Rose  [id number] [address] [address]  Phone: [phone]  Fax: [phone]             [facility]  [address]  [facility] [address]  Phone: [phone]   Patient: Gilda Schroeder   MR Number: [id number]   YOB: 1975   Date of Visit: 7/16/2020       Dear Dr. Roselia Rose    Thank you for referring Gilda Schroeder to me for evaluation. Attached you will find relevant portions of my assessment and plan of care.    If you have questions, please do not hesitate to call me. I look forward to following Gilda Schroeder along with you.    Sincerely,    Brett Jaffe MD    Enclosure    If you would like to receive this communication electronically, please contact [email or web address] or [phone] to request [employer] Link access.    [employer] Link is a tool which provides read-only access to select patient information with whom you have a relationship. Its easy to use and provides real time access to review your patients record including encounter summaries, notes, results, and demographic information.    If you feel you have received this communication in error or would no longer like to receive these types of communications, please e-mail [email or web address]

## 2020-07-16 NOTE — PROGRESS NOTES
INITIAL PATIENT EDUCATION NOTE    Ms. Gilda Schroeder was seen in pre-kidney transplant clinic for evaluation for kidney, kidney/pancreas or pancreas only transplant.  The patient attended a an individual video education session that discussed/reviewed the following aspects of transplantation: evaluation and selection committee process, UNOS waitlist management/multiple listings, types of organs offered (KDPI < 85%, KDPI > 85%, PHS increased risk, DCD, HCV+, HIV+ for HIV+ recipients and enbloc/dual), financial aspects, surgical procedures, dietary instruction pre- and post-transplant, health maintenance pre- and post-transplant, post-transplant hospitalization and outpatient follow-up, potential to participate in a research protocol, and medication management and side effects.  A question and answer session was provided after the presentation.    The patient was seen by all members of the multi-disciplinary team to include: Nephrologist/PA, Surgeon, , Transplant Coordinator, , Pharmacist and Dietician (if applicable).    The patient reviewed and signed all consents for evaluation which were witnessed and sent to scanning into the [facility] chart.    The patient was given an education book and plan for further evaluation based on her individual assessment.      The patient was encouraged to call with any questions or concerns.

## 2020-07-16 NOTE — TELEPHONE ENCOUNTER
Pt will remain INACTIVE due to Plavix until June 2021 per Dr. Jaffe (correction to note below). Will send CT to surgeons.

## 2020-07-16 NOTE — PROGRESS NOTES
Kidney Transplant Recipient Reevalulation    Referring Physician: Roselia Rose  Current Nephrologist: Roselia Rose  Waitlist Status: inactive  Dialysis Start Date: 3/31/2016    Subjective:     CC:  Annual reassessment of kidney transplant candidacy.    HPI:  Ms. Jovani Schroeder is a 45 y.o. year old Black or  female with ESRD secondary to diabetic nephropathy.  She has been on the wait list for a kidney transplant at [facility] since 3/31/2016. Patient is currently on hemodialysis started on 3/2016. Patient is dialyzing on home hemodialysis 3 to 4 times a week, 2 hrs 20 minutes.  Patient reports that she is tolerating dialysis well.. She has a LUE AV graft. Patient denies any recent hospitalizations or ED visits.    6/27/2020 Patient has 2 coronary stents placed in June 2020 and was advised to be on plavix to complete 1 yr. Patient had severe cough and vomit, she went to [facility]. Subsequently the patient then had an elevation in troponin to 16.  Patient was diagnosed with non ST elevation MI.  Patient underwent angiogram by Dr. Vincent.  She received drug-eluting stent to the LAD.    She also had 2 stents placed in June 2019 7/2/2020 She says that he was hospitalized due to hypoglycemia for 2 days.    Before the pandemic she was more active cooking ans cleaning. She walked once a week. Able to walk for 25 minutes.    Now she is more inactive and sedentary. Mo chest pain or SOB.    No living donors available.           Current Outpatient Medications on File Prior to Visit   Medication Sig Dispense Refill    acetaminophen (TYLENOL) 500 MG tablet Take 500 mg by mouth every 6 (six) hours as needed for Pain (headache).      amLODIPine (NORVASC) 10 MG tablet Take 10 mg by mouth once daily.       aspirin (ECOTRIN) 81 MG EC tablet Take 81 mg by mouth once daily.      atorvastatin (LIPITOR) 80 MG tablet Take 1 tablet (80 mg total) by mouth every evening. 90 tablet 3    calcitRIOL  (ROCALTROL) 0.5 MCG Cap Take 0.5 mcg by mouth once daily.      calcium acetate (PHOSLO) 667 mg capsule Take 1,334 mg by mouth 3 (three) times daily with meals.       cinacalcet (SENSIPAR) 60 MG Tab Take 60 mg by mouth once daily.  11    cloNIDine (CATAPRES) 0.2 MG tablet Take 1 tablet by mouth 3 (three) times daily.      ergocalciferol (ERGOCALCIFEROL) 50,000 unit Cap Take 50,000 Units by mouth every 30 days.       escitalopram oxalate (LEXAPRO) 10 MG tablet Take 10 mg by mouth once daily.      flash glucose sensor Kit Inject 1 Device into the skin.      hydrALAZINE (APRESOLINE) 25 MG tablet Take 4 tablets (100 mg total) by mouth every 8 (eight) hours.      insulin aspart U-100 (NOVOLOG) 100 unit/mL injection Inject into the skin. Sliding scale:    150-200 = 2 units  201-250 = 4 units  251-300 = 6 units  301-350 = 8 units   351-400 = 10 units  >400 call MD      insulin detemir U-100 (LEVEMIR) 100 unit/mL injection Inject 18 Units into the skin 2 (two) times daily.  12    lidocaine-prilocaine (EMLA) cream APPLY TOPICALLY 30 MINTUES PRE TREATMENT  3    metoclopramide HCl (REGLAN) 5 MG tablet Take 5 mg by mouth 4 (four) times daily.      metoprolol succinate (TOPROL-XL) 200 MG 24 hr tablet Take 1 tablet (200 mg total) by mouth once daily. 30 tablet 0    ondansetron (ZOFRAN) 4 MG tablet Take 4 mg by mouth every 8 (eight) hours as needed for Nausea.       pantoprazole (PROTONIX) 40 MG tablet Take 40 mg by mouth once daily.      sodium bicarbonate 650 MG tablet Take 650 mg by mouth 2 (two) times daily.  1    ticagrelor (BRILINTA) 90 mg tablet Take 90 mg by mouth 2 (two) times a day.      traZODone (DESYREL) 100 MG tablet Take 100 mg by mouth nightly.  5     Current Facility-Administered Medications on File Prior to Visit   Medication Dose Route Frequency Provider Last Rate Last Dose    medroxyPROGESTERone (DEPO-PROVERA) injection 150 mg  150 mg Intramuscular Q90 Days F. Benjy Thacker MD   150 mg at  "11/13/19 1448        Review of Systems     Skin: no skin rash  CNS; no headaches, blurred vision, seizure, or syncope  ENT: No JVD,  Adenopathies,  nasal congestion. No oral lesions  Cardiac: No chest pain, dyspnea, claudication, edema or palpitations  Respiratory: No SOB, cough, hemoptysis   Gastro-intestinal: No diarrhea, constipation, abdominal pain, nausea, vomit. No ascitis  Genitourinary: no hematuria, dysuria, frequency, frequency  Musculoskeletal: joint pain, arthritis or vasculitic changes  Psych: alert awake, oriented, No cranial nerves deficit.      Objective:   body mass index is 25.85 kg/m².    Physical Exam     BP (!) 149/71 (BP Location: Right arm, Patient Position: Sitting, BP Method: Medium (Automatic))   Pulse 72   Temp 98.5 °F (36.9 °C) (Oral)   Resp 16   Ht 5' 4.37" (1.635 m)   Wt 69.1 kg (152 lb 5.4 oz)   SpO2 100%   BMI 25.85 kg/m²       Head: normocephalic  Neck: No JVD, cervical axillary, or femoral adenopathies  Heart: no murmurs, Normal s1 and s2, No gallops, no rubs, No murmurs  Lungs; CTA, good respiratory effort, no crackles  Abdomen: soft, non tender, no splenomegaly or hepatomegaly, no massess, no bruits  Extremities: No edema, skin rash, joint pain. LUE AVG  SNC: awake, alert oriented. Cranial nerves are intact, no focalized, sensitivity and strength preserved      Labs:  Lab Results   Component Value Date    WBC 9.72 06/30/2020    HGB 12.7 06/30/2020    HCT 39.7 06/30/2020     (L) 07/02/2020    K 4.9 07/02/2020     07/02/2020    CO2 22 07/02/2020    BUN 41 (H) 07/02/2020    CREATININE 8.11 (H) 07/02/2020    EGFRNONAA 5 (A) 07/02/2020    CALCIUM 9.4 07/02/2020    PHOS 4.0 07/02/2020    MG 2.1 06/26/2020    ALBUMIN 3.8 07/02/2020     (H) 06/30/2020     (H) 06/30/2020    .0 (H) 09/06/2018       Lab Results   Component Value Date    LIPASE 55 06/09/2019       No results found for: HLAABCTYPE    Lab Results   Component Value Date    CPRA 99 " 03/24/2020    ZL2KVUD  03/24/2020     B49,A31,A74,B51,A33,A3,B63,A2,A32,A30,B59,B52,A68,B13,B38,A69,B77,A66,B57,B53,B58,B44,A34,B47,A11,B37,B60,B61,B50,B41,B45,A25,B27,B46,B73,B76    CIABCLM WEAK---CW9, CW10, CW8, CW1 03/24/2020    CIIAB DR15,DR4,DR16,DR53,DR51,DR7,DR1,,DR9,DR10 03/24/2020    ABCMT DR14, DQ2, WEAK---DQA1*06:01, DRB1*13:03,  03/24/2020       Labs were reviewed with the patient.    Pre-transplant Workup:   Reviewed with the patient.    Assessment:     1. ESRD (end stage renal disease)    2. Anticoagulant long-term use--Plavix    3. Coronary artery disease involving native coronary artery of native heart without angina pectoris    4. Organ transplant candidate        Plan:     Transplant Candidacy:   Ms. Jovani Schroeder is an unacceptable kidney transplant candidate.  Meets center eligibility for accepting HCV+ donor offer - yes.  Patient educated on HCV+ donors. [facility] is willing  to accept HCV+ donor offer -  yes   Patient is a candidate for KDPI > 85 kidney donor offer - no.  She will be placed in an inactive status at present due to need for plavix for 1 yr.    I spoke at length with the patient and care giver and explained that she needs to stay inactive due to recent NSTEMI and plavix for 1 yr until June 2021    I encourage patient to discuss living donors with her family and friends    Current data was discussed with patient will have our transplant surgery review CT of the abdomen    Education provided    All questions answered.    Brett Jaffe MD       Follow-up:   In addition to the tests noted in the plan, Ms. Jovani Schroeder will continue to have reevaluation as per the standing pre-kidney transplant protocol:  1. Monthly blood for PRA  2. Annual return to clinic, except HIV positive, > 65 years of age, or pancreas transplant candidates who will be scheduled to see transplant every 6 months while in pre-transplant phase  3. Annual re-testing: CXR, EKG, yearly mammograms for women over  40 and PSA for males over 40, cardiology follow-up as recommended by initial cardiology pre-transplant evaluation  4. Renal ultrasound every 2 years  5. Baseline colonoscopy after age 50 and repeated as recommended    UNOS Patient Status  Functional Status: 60% - Requires occasional assistance but is able to care for needs  Physical Capacity: No Limitations

## 2020-07-21 ENCOUNTER — TELEPHONE (OUTPATIENT)
Dept: TRANSPLANT | Facility: CLINIC | Age: 45
End: 2020-07-21

## 2020-07-21 NOTE — TELEPHONE ENCOUNTER
----- Message from Puma Panda Jr., MD sent at 7/21/2020 11:04 AM CDT -----  Regarding: RE: film review please  Diffuse thin common and external iliac calcifications. Possibly prohibitive, need to discuss.     DS  ----- Message -----  From: Patricia Gold  Sent: 7/14/2020  11:30 AM CDT  To: Puma Panda Jr., MD  Subject: film review please                               Dr. Panda,  MsSteven has an outside CT on 6/24/20. She also had a pelvis x-ray here on 7/9/20. Please review films and advise on kidney candidacy. Thanks.

## 2020-07-24 ENCOUNTER — COMMITTEE REVIEW (OUTPATIENT)
Dept: TRANSPLANT | Facility: CLINIC | Age: 45
End: 2020-07-24

## 2020-07-24 NOTE — COMMITTEE REVIEW
Native Organ Dx: Hypertensive Nephrosclerosis      SELECTION COMMITTEE NOTE    Gilda Schroeder was discussed selection committee on 7/24/20 for surgical review of the CT abdomen/Pelvis dated 7/20/20.  There is a place to sew in a new kidney at this time, but patient should be made aware that she is at a high risk of not being a surgical candidate in the future if her vessels continue to decline. She should practice PTH control. Repeat Ct in 2 years (7/2022) if not transplanted before then.           Note written by Patricia Gold RN    ===============================================    I was present at the meeting and attest to the decision of the committee

## 2020-07-24 NOTE — PROGRESS NOTES
Transplant Recipient Adult Psychosocial Assessment Update    Gilda Schroeder  [address] [place] [address]  Telephone Information:   Mobile [phone]   Mobile [phone]   Home  [phone] (home)  Work  There is no work phone number on file.  E-mail  [email or web address]    Sex: female  YOB: 1975  Age: 45 y.o.    Encounter Date: 7/16/2020  U[place] Citizen: yes  Primary Language: English   Needed: no    Emergency Contact:  Name: Kenrick Schroeder  Relationship:   Address: same as pt  Phone Numbers:  [phone]    Family/Social Support:   Number of dependents/: 1 minor child (Jen) who will be taken care of by pt's adult children or pt's sister Rm and/or brother.  Marital history: pt reports being  to pt's  for the past 16 years.    Other family dynamics: Pt reports parents are living.  Very complicated family of origin history.  Pt reports having 4 children:  Jeane Lemus, age 25, does drive, lives in [place], [place]; Denis Hahn, age 23, lives with pt; Mari Schroeder, age 19, lives with pt; Jen Schroeder, age 13, lives with pt.  Pt reports having 1 supportive sister and 1 brother  (not part of pt's life, frequently in trouble with the law). Pt also discussed having a very supportive Jain Family as well.    Household Composition:  Name: Kenrick Schroeder  Age: 45  Relationship:   Does person drive? yes    Name: Curtis Schroeder [phone]  Age: 19  Relationship: daughter  Does person drive? yes     Name: Jen Schroeder  Age: 13  Relationship: daughter  Does person drive? Yes    Do you and your caregivers have access to reliable transportation? yes  PRIMARY CAREGIVER: Kenrick pt's , will be primary caregiver, phone number [phone].      provided in-depth information to patient and caregiver regarding pre- and post-transplant caregiver role.   strongly encourages patient and caregiver to have concrete plan  regarding post-transplant care giving, including back-up caregiver(s) to ensure care giving needs are met as needed.    Patient and Caregiver states understanding all aspects of caregiver role/commitment and is able/willing/committed to being caregiver to the fullest extent necessary.    Patient and Caregiver verbalizes understanding of the education provided today and caregiver responsibilities.         remains available. Patient and Caregiver agree to contact  in a timely manner if concerns arise.      Able to take time off work without financial concerns: yes.     Additional Significant Others who will Assist with Transplant:  Name: Rm Flores  Age: 36 (cher)  City: [place] State: [place]  Relationship: sister  Does person drive? yes ; [phone]    Name: Sadaf So (Emotional support only due to location)  Age: 43  City: [place] State: [place]  Relationship: cousin   Does person drive? yes ; phone [phone]     Name: Jeane Michele  Age: 25  City: [place] State: [place]  Relationship: dtr   Does person drive? yes ; phone [phone]      Living Will: no  Healthcare Power of : no  Advance Directives on file: <<no information> per medical record. Pt reports trusting  with medical needs  Verbally reviewed LW/HCPA information.   provided patient with copy of LW/HCPA documents and provided education on completion of forms.    Living Donors:   No. Education and resource information given to patient.        Highest Education Level: Attended College/Technical School  Reading Ability: college  Reports difficulty with: seeing and wears glasses  Learns Best By:  a combination of verbal, written, and hands-on instructions.     Status: no  VA Benefits: no     Working for Income: No  If no, reason not working: Disability  Patient is disabled.  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    Spouse/Significant Other  Employment: pt's  reports working fulltime as a [employer] in the [employer] and reports can take off of work as needed.    Disabled: yes: date disability began: 2016, due to: ESRD.    Monthly Income:  SS Disability: $218  Other household members total: $ currently unemployed from oil field  Able to afford all costs now and if transplanted, including medications: yes  Patient and Caregiver verbalizes understanding of personal responsibilities related to transplant costs and the importance of having a financial plan to ensure that patients transplant costs are fully covered.       provided fundraising information/education. Patient and Caregiververbalizes understanding.   remains available.    Insurance:   Payor/Plan Subscr  Sex Relation Sub. Ins. ID Effective Group Num   1. BLUE CROSS BL* ROSA ALVA 1978 Male  [id number]                                   [address] [address]   2. MEDICARE - ME* KERWIN SINGER* 1975 Female  [id number]                                    [address]   Pt reports she has Medicare and Medicaid since  was laid off.  2020    Primary Insurance (for UNOS reporting): Private Insurance via pt's 's employer.  SW provided education regarding premium and disability limitations when solely based on ESRD.  Pt verbalizes understanding and reports pt's SW at dialysis is currently working to have pt participate in this arthur.  SW remains available.    Secondary Insurance (for UNOS reporting): Public Insurance - Medicare FFS (Fee For Service)     Patient and Caregiver verbalizes clear understanding that patient may experience difficulty obtaining and/or be denied insurance coverage post-surgery. This includes and is not limited to disability insurance, life insurance, health insurance, burial insurance, long term care insurance, and other insurances.      Patient and Caregiver also reports understanding that  future health concerns related to or unrelated to transplantation may not be covered by patient's insurance.  Resources and information provided and reviewed.     Patient and Caregiver provides verbal permission to release any necessary information to outside resources for patient care and discharge planning.  Resources and information provided are reviewed.      Dialysis Adherence: Patient reports adherence with all aspects of plan or care, including medications, appointments, diet, and dialysis. SW faxed adherence form.     Infusion Service: patient utilizing? no  Home Health: patient utilizing? no  DME: home hemo equipment and supplies  Pulmonary/Cardiac Rehab: denies   ADLS:  Pt reports pt is independent with all ADLS including driving, bathing, walking, taking medications, cooking, housekeeping, eating, and shopping.    Adherence:    Pt states current and expected adherence with all healthcare recommendations.  Adherence education and counseling provided.     Per History Section:  Past Medical History:   Diagnosis Date    Allergy     morphine    Anemia     Anticoagulant long-term use     heparin    CAD (coronary artery disease)     stent x 2    Diabetes mellitus     Diabetes mellitus with ESRD (end-stage renal disease) 7/5/2016    Disorder of kidney and ureter     ESRD (end stage renal disease) on dialysis     Gastroesophageal reflux disease without esophagitis 1/10/2018    HTN (hypertension)     Obesity     Peritonitis associated with peritoneal dialysis 08/2017     Social History - per psychosocial   Substance Use Topics    Smoking status: Former Smoker     Packs/day: 0.50     Types: Cigarettes     Quit date: 7/1/2014    Smokeless tobacco: Not on file    Alcohol use No      Comment: rare social drinker     Social History     Substance and Sexual Activity   Drug Use No       Patient and Caregiver states clear understanding of the potential impact of substance use as it relates to transplant  candidacy and is aware of possible random substance screening.  Substance abstinence/cessation counseling, education and resources provided and reviewed.     Arrests/DWI/Treatment/Rehab: patient denies    Psychiatric History:    Mental Health: Patient denies having current and/or past depression, emotional concerns, mental health diagnosis and anxiety at this time. Pt stated she has received regular counseling from her , Thomas Pompa at [facility]. She now gets counseling as needed.   Medications:  Patient denies patient is taking medication(s) for mental health issues at this time.      Suicide/Homicide Issues: Pt denies any current and/or past SI/HI at this time.   Safety at home: Pt denies any current and/or past DV concerns at this time.  Pt reports feeling safe in pt's home environment.      Knowledge: Patient and Caregiver states having clear understanding and realistic expectations regarding the potential risks and potential benefits of organ transplantation and organ donation and agrees to discuss with health care team members and support system members, as well as to utilize available resources and express questions and/or concerns in order to further facilitate the pt informed decision-making.  Resources and information provided and reviewed.    Patient and Caregiver is aware of [facility]'s affiliation and/or partnership with agencies in home health care, LTAC, SNF, [facility], and other hospitals and clinics.    Understanding: Patient and Caregiver reports having a clear understanding of the many lifetime commitments involved with being a transplant recipient, including costs, compliance, medications, lab work, procedures, appointments, concrete and financial planning, preparedness, timely and appropriate communication of concerns, abstinence (ETOH, tobacco, illicit non-prescribed drugs), adherence to all health care team recommendations, support system and caregiver involvement, appropriate and  timely resource utilization and follow-through, mental health counseling as needed/recommended, and patient and caregiver responsibilities.  Social Service Handbook, resources and detailed educational information provided and reviewed.  Educational information provided.    Patient and Caregiver also reports current and expected compliance with health care regime and states having a clear understanding of the importance of compliance.      Patient and Caregiver reports a clear understanding that risks and benefits may be involved with organ transplantation and with organ donation.       Patient and Caregiver also reports clear understanding that psychosocial risk factors may affect patient, and include but are not limited to feelings of depression, generalized anxiety, anxiety regarding dependence on others, post traumatic stress disorder, feelings of guilt and other emotional and/or mental concerns, and/or exacerbation of existing mental health concerns.  Detailed resources provided and discussed.      Patient and Caregiver agrees to access appropriate resources in a timely manner as needed and/or as recommended, and to communicate concerns appropriately.  Patient and Caregiver also reports a clear understanding of treatment options available.     Patient and Caregiver received education in a group setting.   reviewed education, provided additional information, and answered questions.    Feelings or Concerns: Pt denies having any concerns and/or overwhelming feelings regarding transplant at this time. Pt reports high motivation to pursue organ transplant at this time.    Coping: Patient reports that she is coping with the work-up process.  Patient reports utilizing family members, friends, engaging in hobbies: social media, reading, and watching tv, prayer and spiritual support: active member of [employer] (now online due to COVID) as patient's effective coping strategies.  Pt denies need for  "additional assistance at this time. Pt agrees to call transplant team as needed. SW remains available.       Goals: Pt reports a goal of ending dialysis and returning to work. Patient referred to Vocational Rehabilitation. SW provided support and education. Pt verbalizes understanding that transplant is not a guarantee of ending dialysis and life after transplantation will be a "new normal" post transplant.  SW remains available.    Interview Behavior: Patient and Caregivers presents as alert and oriented x 4, pleasant, good eye contact, calm, communicative, cooperative and asking and answering questions appropriately. Pt presented with marina Knutson who appeared to be supportive of pt's pursuit of transplant.         Transplant Social Work - Candidacy  Assessment/Plan:     Psychosocial Suitability: Patient presents as an acceptable candidate for kidney transplant at this time. Based on psychosocial risk factors, patient presents as low risk, due to pt's stable financial plan, pt's good adherence, pt coping appropriately with the work-up process, and pt's confirmed caregiver plan.       Recommendations/Additional Comments:  counseled patient and caregivers extensively on fundraising, housing, transportation and caregiver plan.   recommends fundraising.  Patient will benefit from housing at [facility].   Patient verbalizes understanding.   remains available.      Marianna Menon LCSW         "

## 2020-08-03 ENCOUNTER — TELEPHONE (OUTPATIENT)
Dept: TRANSPLANT | Facility: CLINIC | Age: 45
End: 2020-08-03

## 2020-08-03 NOTE — TELEPHONE ENCOUNTER
"Compliance check:  Dialysis unit reports in the past three months pt has had "none" no shows, "none" AMAs, labs , no lab concerns, transportation no concerns noted and family support no concerns noted.    Reported by [facility] via fax back sheet    "

## 2020-11-11 PROBLEM — R79.89 ELEVATED TROPONIN: Status: ACTIVE | Noted: 2020-11-11

## 2020-11-11 PROBLEM — Z95.5 STENTED CORONARY ARTERY: Status: ACTIVE | Noted: 2020-11-11

## 2020-11-11 PROBLEM — Z99.2 END STAGE RENAL DISEASE ON DIALYSIS: Status: ACTIVE | Noted: 2018-10-13

## 2020-11-12 PROBLEM — I50.32 CHRONIC DIASTOLIC HEART FAILURE: Status: ACTIVE | Noted: 2020-11-12

## 2020-11-16 PROBLEM — Z95.5 HISTORY OF HEART ARTERY STENT: Status: ACTIVE | Noted: 2020-11-16

## 2020-11-16 PROBLEM — I25.110 CORONARY ARTERY DISEASE INVOLVING NATIVE CORONARY ARTERY OF NATIVE HEART WITH UNSTABLE ANGINA PECTORIS: Status: ACTIVE | Noted: 2020-11-16

## 2020-11-18 PROBLEM — I25.10 CORONARY ARTERY DISEASE: Status: ACTIVE | Noted: 2020-11-16

## 2020-11-18 PROBLEM — Z95.1 S/P CABG X 5: Status: ACTIVE | Noted: 2020-11-16

## 2020-11-19 PROBLEM — I48.91 ATRIAL FIBRILLATION WITH RVR: Status: ACTIVE | Noted: 2020-11-19

## 2020-11-20 PROBLEM — E87.1 HYPONATREMIA: Status: ACTIVE | Noted: 2020-11-20

## 2020-11-20 PROBLEM — I25.10 CAD (CORONARY ARTERY DISEASE), NATIVE CORONARY ARTERY: Status: ACTIVE | Noted: 2020-11-20

## 2020-11-21 PROBLEM — K59.00 CONSTIPATION: Status: ACTIVE | Noted: 2020-11-21

## 2020-11-23 PROBLEM — I50.32 CHRONIC DIASTOLIC HEART FAILURE: Status: RESOLVED | Noted: 2020-11-12 | Resolved: 2020-11-23

## 2020-11-23 PROBLEM — K59.00 CONSTIPATION: Status: RESOLVED | Noted: 2020-11-21 | Resolved: 2020-11-23

## 2020-11-23 PROBLEM — R79.89 ELEVATED TROPONIN: Status: RESOLVED | Noted: 2020-11-11 | Resolved: 2020-11-23

## 2020-11-23 PROBLEM — I25.10 CORONARY ARTERY DISEASE: Status: RESOLVED | Noted: 2020-11-16 | Resolved: 2020-11-23

## 2020-11-23 PROBLEM — E87.1 HYPONATREMIA: Status: RESOLVED | Noted: 2020-11-20 | Resolved: 2020-11-23

## 2020-11-25 PROBLEM — R06.02 SOB (SHORTNESS OF BREATH): Status: RESOLVED | Noted: 2017-12-12 | Resolved: 2020-11-25

## 2020-12-01 ENCOUNTER — TELEPHONE (OUTPATIENT)
Dept: PULMONOLOGY | Facility: CLINIC | Age: 45
End: 2020-12-01

## 2020-12-01 NOTE — TELEPHONE ENCOUNTER
----- Message from Emma Crane MA sent at 12/1/2020  3:38 PM CST -----  Pt is requesting a call back to scheduled a hospital follow up with in 2-3 weeks  Call back # [phone]

## 2020-12-06 PROBLEM — I48.0 PAF (PAROXYSMAL ATRIAL FIBRILLATION): Status: ACTIVE | Noted: 2020-12-06

## 2020-12-06 PROBLEM — I48.91 ATRIAL FIBRILLATION WITH RVR: Status: ACTIVE | Noted: 2020-12-06

## 2020-12-29 ENCOUNTER — OFFICE VISIT (OUTPATIENT)
Dept: VASCULAR SURGERY | Facility: CLINIC | Age: 45
End: 2020-12-29
Payer: MEDICARE

## 2020-12-29 VITALS
SYSTOLIC BLOOD PRESSURE: 180 MMHG | BODY MASS INDEX: 28.47 KG/M2 | WEIGHT: 170.88 LBS | HEIGHT: 65 IN | DIASTOLIC BLOOD PRESSURE: 83 MMHG | HEART RATE: 73 BPM

## 2020-12-29 DIAGNOSIS — Z95.1 S/P CABG X 5: Primary | ICD-10-CM

## 2020-12-29 PROCEDURE — 99999 PR PBB SHADOW E&M-EST. PATIENT-LVL III: ICD-10-PCS | Mod: PBBFAC,TXP,, | Performed by: THORACIC SURGERY (CARDIOTHORACIC VASCULAR SURGERY)

## 2020-12-29 PROCEDURE — 99024 POSTOP FOLLOW-UP VISIT: CPT | Mod: NTX,POP,, | Performed by: THORACIC SURGERY (CARDIOTHORACIC VASCULAR SURGERY)

## 2020-12-29 PROCEDURE — 99999 PR PBB SHADOW E&M-EST. PATIENT-LVL III: CPT | Mod: PBBFAC,TXP,, | Performed by: THORACIC SURGERY (CARDIOTHORACIC VASCULAR SURGERY)

## 2020-12-29 PROCEDURE — 99213 OFFICE O/P EST LOW 20 MIN: CPT | Mod: PBBFAC,PO,TXP | Performed by: THORACIC SURGERY (CARDIOTHORACIC VASCULAR SURGERY)

## 2020-12-29 PROCEDURE — 99024 PR POST-OP FOLLOW-UP VISIT: ICD-10-PCS | Mod: NTX,POP,, | Performed by: THORACIC SURGERY (CARDIOTHORACIC VASCULAR SURGERY)

## 2020-12-29 NOTE — PROGRESS NOTES
"Subjective:       Gilda Schroeder presents to the clinic after undergoing coronary artery bypass x5 with reverse saphenous vein graft sequentially to diagonal 2 and left anterior descending, reverse saphenous vein graft to obtuse marginal, and reverse saphenous vein graft sequentially to the right posterior descending and circumflex posterior lateral ventricular branch at [facility] on 11/16/2020.  Overall she feels well.  She has been active within restrictions.  She notices no palpitations and no angina.       Objective:      BP (!) 180/83 (BP Location: Right arm, Patient Position: Sitting)   Pulse 73   Ht 5' 5" (1.651 m)   Wt 77.5 kg (170 lb 13.7 oz)   BMI 28.43 kg/m²     Objective  General she looks really good overall.  Chest:  Sternotomy incisions are healed.  Sternum is stable.  Lungs:  Clear bilaterally.  Heart:  Regular rate and rhythm.  No rubs or murmurs.  Extremities:  Left leg incision is healed.  There is trace below knee edema.       Assessment:      Doing well postoperatively.      Plan:      1. Continue any current medications.  2. Wound care discussed.  3. Pt is to increase activities as tolerated without lifting greater than 15 lb for a full 12 weeks from surgery.  4. Follow up: 7 weeks in [place]      "

## 2020-12-31 ENCOUNTER — DOCUMENT SCAN (OUTPATIENT)
Dept: HOME HEALTH SERVICES | Facility: HOSPITAL | Age: 45
End: 2020-12-31
Payer: MEDICAID

## 2020-12-31 ENCOUNTER — DOCUMENT SCAN (OUTPATIENT)
Dept: HOME HEALTH SERVICES | Facility: HOSPITAL | Age: 45
End: 2020-12-31

## 2021-01-08 ENCOUNTER — PATIENT MESSAGE (OUTPATIENT)
Dept: TRANSPLANT | Facility: CLINIC | Age: 46
End: 2021-01-08

## 2021-01-11 ENCOUNTER — OFFICE VISIT (OUTPATIENT)
Dept: CARDIOLOGY | Facility: CLINIC | Age: 46
End: 2021-01-11
Payer: MEDICARE

## 2021-01-11 VITALS
HEIGHT: 65 IN | HEART RATE: 86 BPM | BODY MASS INDEX: 29.75 KG/M2 | DIASTOLIC BLOOD PRESSURE: 86 MMHG | SYSTOLIC BLOOD PRESSURE: 155 MMHG | WEIGHT: 178.56 LBS

## 2021-01-11 DIAGNOSIS — E11.65 UNCONTROLLED TYPE 2 DIABETES MELLITUS WITH HYPERGLYCEMIA: ICD-10-CM

## 2021-01-11 DIAGNOSIS — Z99.2 END STAGE RENAL DISEASE ON DIALYSIS: ICD-10-CM

## 2021-01-11 DIAGNOSIS — I25.5 ISCHEMIC CARDIOMYOPATHY: ICD-10-CM

## 2021-01-11 DIAGNOSIS — Z95.1 S/P CABG X 5: Primary | ICD-10-CM

## 2021-01-11 DIAGNOSIS — N18.6 END STAGE RENAL DISEASE ON DIALYSIS: ICD-10-CM

## 2021-01-11 PROCEDURE — 99214 OFFICE O/P EST MOD 30 MIN: CPT | Mod: S$PBB,NTX,, | Performed by: INTERNAL MEDICINE

## 2021-01-11 PROCEDURE — 99214 PR OFFICE/OUTPT VISIT, EST, LEVL IV, 30-39 MIN: ICD-10-PCS | Mod: S$PBB,NTX,, | Performed by: INTERNAL MEDICINE

## 2021-01-11 PROCEDURE — 99999 PR PBB SHADOW E&M-EST. PATIENT-LVL IV: CPT | Mod: PBBFAC,TXP,, | Performed by: INTERNAL MEDICINE

## 2021-01-11 PROCEDURE — 99999 PR PBB SHADOW E&M-EST. PATIENT-LVL IV: ICD-10-PCS | Mod: PBBFAC,TXP,, | Performed by: INTERNAL MEDICINE

## 2021-01-11 PROCEDURE — 99214 OFFICE O/P EST MOD 30 MIN: CPT | Mod: PBBFAC,PO,TXP | Performed by: INTERNAL MEDICINE

## 2021-01-13 ENCOUNTER — EXTERNAL HOME HEALTH (OUTPATIENT)
Dept: HOME HEALTH SERVICES | Facility: HOSPITAL | Age: 46
End: 2021-01-13
Payer: MEDICAID

## 2021-01-27 ENCOUNTER — EXTERNAL HOME HEALTH (OUTPATIENT)
Dept: HOME HEALTH SERVICES | Facility: HOSPITAL | Age: 46
End: 2021-01-27
Payer: MEDICAID

## 2021-03-02 ENCOUNTER — OFFICE VISIT (OUTPATIENT)
Dept: CARDIAC SURGERY | Facility: CLINIC | Age: 46
End: 2021-03-02
Payer: MEDICARE

## 2021-03-02 VITALS
BODY MASS INDEX: 27.07 KG/M2 | TEMPERATURE: 98 F | WEIGHT: 162.5 LBS | HEIGHT: 65 IN | HEART RATE: 65 BPM | DIASTOLIC BLOOD PRESSURE: 77 MMHG | SYSTOLIC BLOOD PRESSURE: 133 MMHG

## 2021-03-02 DIAGNOSIS — Z95.1 S/P CABG X 5: Primary | ICD-10-CM

## 2021-03-02 PROCEDURE — 99024 PR POST-OP FOLLOW-UP VISIT: ICD-10-PCS | Mod: NTX,POP,, | Performed by: THORACIC SURGERY (CARDIOTHORACIC VASCULAR SURGERY)

## 2021-03-02 PROCEDURE — 99999 PR PBB SHADOW E&M-EST. PATIENT-LVL V: CPT | Mod: PBBFAC,TXP,, | Performed by: THORACIC SURGERY (CARDIOTHORACIC VASCULAR SURGERY)

## 2021-03-02 PROCEDURE — 99999 PR PBB SHADOW E&M-EST. PATIENT-LVL V: ICD-10-PCS | Mod: PBBFAC,TXP,, | Performed by: THORACIC SURGERY (CARDIOTHORACIC VASCULAR SURGERY)

## 2021-03-02 PROCEDURE — 99215 OFFICE O/P EST HI 40 MIN: CPT | Mod: PBBFAC,PO,TXP | Performed by: THORACIC SURGERY (CARDIOTHORACIC VASCULAR SURGERY)

## 2021-03-02 PROCEDURE — 99024 POSTOP FOLLOW-UP VISIT: CPT | Mod: NTX,POP,, | Performed by: THORACIC SURGERY (CARDIOTHORACIC VASCULAR SURGERY)

## 2021-03-02 RX ORDER — LOSARTAN POTASSIUM 100 MG/1
TABLET ORAL
COMMUNITY
Start: 2021-02-03 | End: 2021-08-26 | Stop reason: SDUPTHER

## 2021-03-02 RX ORDER — INSULIN ASPART 100 [IU]/ML
INJECTION, SOLUTION INTRAVENOUS; SUBCUTANEOUS
COMMUNITY
Start: 2020-12-22 | End: 2021-08-26 | Stop reason: SDUPTHER

## 2021-03-02 RX ORDER — MELATONIN 3 MG
CAPSULE ORAL
COMMUNITY
Start: 2020-12-22 | End: 2021-08-26 | Stop reason: SDUPTHER

## 2021-03-02 RX ORDER — INSULIN DETEMIR 100 [IU]/ML
INJECTION, SOLUTION SUBCUTANEOUS
COMMUNITY
Start: 2020-12-22 | End: 2021-08-26 | Stop reason: SDUPTHER

## 2021-03-02 RX ORDER — ESCITALOPRAM OXALATE 10 MG/1
1 TABLET ORAL
COMMUNITY
Start: 2020-04-27 | End: 2021-08-26 | Stop reason: SDUPTHER

## 2021-03-02 RX ORDER — CINACALCET 60 MG/1
1 TABLET, FILM COATED ORAL
COMMUNITY
Start: 2021-02-22 | End: 2021-08-26 | Stop reason: SDUPTHER

## 2021-03-02 RX ORDER — CALCITRIOL 0.25 UG/1
0.5 CAPSULE ORAL
COMMUNITY
Start: 2021-02-22 | End: 2022-07-10 | Stop reason: CLARIF

## 2021-03-02 RX ORDER — AMIODARONE HYDROCHLORIDE 200 MG/1
1 TABLET ORAL
COMMUNITY
Start: 2020-12-22 | End: 2021-08-26 | Stop reason: SDUPTHER

## 2021-03-02 RX ORDER — PANTOPRAZOLE SODIUM 40 MG/1
40 TABLET, DELAYED RELEASE ORAL EVERY MORNING
Status: ON HOLD | COMMUNITY
Start: 2020-12-22 | End: 2022-01-01 | Stop reason: HOSPADM

## 2021-03-02 RX ORDER — SODIUM BICARBONATE 650 MG/1
1 TABLET ORAL 3 TIMES DAILY
Status: ON HOLD | COMMUNITY
Start: 2021-01-27 | End: 2022-07-26 | Stop reason: SDUPTHER

## 2021-03-02 RX ORDER — HYDRALAZINE HYDROCHLORIDE 50 MG/1
1 TABLET, FILM COATED ORAL
COMMUNITY
Start: 2020-12-22 | End: 2021-08-26 | Stop reason: SDUPTHER

## 2021-03-02 RX ORDER — ONDANSETRON 4 MG/1
1 TABLET, FILM COATED ORAL
COMMUNITY
Start: 2020-12-22 | End: 2021-08-26 | Stop reason: SDUPTHER

## 2021-03-02 RX ORDER — METOPROLOL SUCCINATE 100 MG/1
100 TABLET, EXTENDED RELEASE ORAL DAILY
COMMUNITY
Start: 2021-02-23 | End: 2021-08-26 | Stop reason: SDUPTHER

## 2021-03-02 RX ORDER — SEVELAMER CARBONATE 800 MG/1
2 TABLET, FILM COATED ORAL
COMMUNITY
Start: 2020-12-22 | End: 2021-08-26

## 2021-04-30 ENCOUNTER — CLINICAL SUPPORT (OUTPATIENT)
Dept: CARDIOLOGY | Facility: CLINIC | Age: 46
End: 2021-04-30
Attending: INTERNAL MEDICINE
Payer: MEDICARE

## 2021-04-30 VITALS — HEIGHT: 65 IN | WEIGHT: 162 LBS | BODY MASS INDEX: 26.99 KG/M2

## 2021-04-30 PROCEDURE — 99999 PR PBB SHADOW E&M-EST. PATIENT-LVL I: CPT | Mod: PBBFAC,TXP,,

## 2021-04-30 PROCEDURE — 99211 OFF/OP EST MAY X REQ PHY/QHP: CPT | Mod: PBBFAC,PO,25,NTX

## 2021-04-30 PROCEDURE — 93306 ECHO (CUPID ONLY): ICD-10-PCS | Mod: 26,S$PBB,NTX, | Performed by: INTERNAL MEDICINE

## 2021-04-30 PROCEDURE — 99999 PR PBB SHADOW E&M-EST. PATIENT-LVL I: ICD-10-PCS | Mod: PBBFAC,TXP,,

## 2021-04-30 PROCEDURE — 93306 TTE W/DOPPLER COMPLETE: CPT | Mod: PBBFAC,PO,NTX | Performed by: INTERNAL MEDICINE

## 2021-05-03 LAB
ASCENDING AORTA: 2.76 CM
AV INDEX (PROSTH): 0.72
AV MEAN GRADIENT: 6 MMHG
AV PEAK GRADIENT: 11 MMHG
AV VALVE AREA: 2.24 CM2
AV VELOCITY RATIO: 0.69
BSA FOR ECHO PROCEDURE: 1.84 M2
CV ECHO LV RWT: 0.55 CM
DOP CALC AO PEAK VEL: 1.65 M/S
DOP CALC AO VTI: 34.68 CM
DOP CALC LVOT AREA: 3.1 CM2
DOP CALC LVOT DIAMETER: 1.99 CM
DOP CALC LVOT PEAK VEL: 1.14 M/S
DOP CALC LVOT STROKE VOLUME: 77.81 CM3
DOP CALCLVOT PEAK VEL VTI: 25.03 CM
E WAVE DECELERATION TIME: 190.8 MSEC
E/A RATIO: 2
E/E' RATIO: 18 M/S
ECHO LV POSTERIOR WALL: 1.15 CM (ref 0.6–1.1)
EJECTION FRACTION: 60 %
FRACTIONAL SHORTENING: 33 % (ref 28–44)
INTERVENTRICULAR SEPTUM: 1.19 CM (ref 0.6–1.1)
LA MAJOR: 5.16 CM
LA MINOR: 5.05 CM
LA WIDTH: 4.14 CM
LEFT ATRIUM SIZE: 3.18 CM
LEFT ATRIUM VOLUME INDEX: 31.6 ML/M2
LEFT ATRIUM VOLUME: 57.12 CM3
LEFT INTERNAL DIMENSION IN SYSTOLE: 2.82 CM (ref 2.1–4)
LEFT VENTRICLE DIASTOLIC VOLUME INDEX: 43.4 ML/M2
LEFT VENTRICLE DIASTOLIC VOLUME: 78.55 ML
LEFT VENTRICLE MASS INDEX: 95 G/M2
LEFT VENTRICLE SYSTOLIC VOLUME INDEX: 16.6 ML/M2
LEFT VENTRICLE SYSTOLIC VOLUME: 30.1 ML
LEFT VENTRICULAR INTERNAL DIMENSION IN DIASTOLE: 4.2 CM (ref 3.5–6)
LEFT VENTRICULAR MASS: 171.69 G
LV LATERAL E/E' RATIO: 14 M/S
LV SEPTAL E/E' RATIO: 25.2 M/S
MV PEAK A VEL: 0.63 M/S
MV PEAK E VEL: 1.26 M/S
MV STENOSIS PRESSURE HALF TIME: 55.33 MS
MV VALVE AREA P 1/2 METHOD: 3.98 CM2
PISA MRMAX VEL: 0.06 M/S
PISA TR MAX VEL: 2.9 M/S
RA MAJOR: 5.19 CM
RA PRESSURE: 3 MMHG
RA WIDTH: 4.47 CM
RIGHT VENTRICULAR END-DIASTOLIC DIMENSION: 4.3 CM
RV TISSUE DOPPLER FREE WALL SYSTOLIC VELOCITY 1 (APICAL 4 CHAMBER VIEW): 9.08 CM/S
SINUS: 2.56 CM
STJ: 2.29 CM
TDI LATERAL: 0.09 M/S
TDI SEPTAL: 0.05 M/S
TDI: 0.07 M/S
TR MAX PG: 34 MMHG
TRICUSPID ANNULAR PLANE SYSTOLIC EXCURSION: 1.88 CM
TV REST PULMONARY ARTERY PRESSURE: 37 MMHG

## 2021-05-05 ENCOUNTER — TELEPHONE (OUTPATIENT)
Dept: CARDIOLOGY | Facility: CLINIC | Age: 46
End: 2021-05-05

## 2021-05-26 ENCOUNTER — EPISODE CHANGES (OUTPATIENT)
Dept: TRANSPLANT | Facility: CLINIC | Age: 46
End: 2021-05-26

## 2021-06-01 ENCOUNTER — EPISODE CHANGES (OUTPATIENT)
Dept: TRANSPLANT | Facility: CLINIC | Age: 46
End: 2021-06-01

## 2021-06-01 DIAGNOSIS — Z76.82 ORGAN TRANSPLANT CANDIDATE: Primary | ICD-10-CM

## 2021-06-30 ENCOUNTER — OFFICE VISIT (OUTPATIENT)
Dept: CARDIOLOGY | Facility: CLINIC | Age: 46
End: 2021-06-30
Payer: MEDICARE

## 2021-06-30 VITALS
HEART RATE: 67 BPM | DIASTOLIC BLOOD PRESSURE: 83 MMHG | HEIGHT: 65 IN | WEIGHT: 177.69 LBS | BODY MASS INDEX: 29.61 KG/M2 | SYSTOLIC BLOOD PRESSURE: 169 MMHG

## 2021-06-30 DIAGNOSIS — I48.0 PAF (PAROXYSMAL ATRIAL FIBRILLATION): ICD-10-CM

## 2021-06-30 DIAGNOSIS — Z95.1 S/P CABG X 5: Primary | ICD-10-CM

## 2021-06-30 DIAGNOSIS — I25.10 CORONARY ARTERY DISEASE INVOLVING NATIVE CORONARY ARTERY OF NATIVE HEART WITHOUT ANGINA PECTORIS: ICD-10-CM

## 2021-06-30 PROCEDURE — 99999 PR PBB SHADOW E&M-EST. PATIENT-LVL V: ICD-10-PCS | Mod: PBBFAC,TXP,, | Performed by: INTERNAL MEDICINE

## 2021-06-30 PROCEDURE — 99215 OFFICE O/P EST HI 40 MIN: CPT | Mod: PBBFAC,PO,TXP | Performed by: INTERNAL MEDICINE

## 2021-06-30 PROCEDURE — 99214 PR OFFICE/OUTPT VISIT, EST, LEVL IV, 30-39 MIN: ICD-10-PCS | Mod: S$PBB,TXP,, | Performed by: INTERNAL MEDICINE

## 2021-06-30 PROCEDURE — 99214 OFFICE O/P EST MOD 30 MIN: CPT | Mod: S$PBB,TXP,, | Performed by: INTERNAL MEDICINE

## 2021-06-30 PROCEDURE — 99999 PR PBB SHADOW E&M-EST. PATIENT-LVL V: CPT | Mod: PBBFAC,TXP,, | Performed by: INTERNAL MEDICINE

## 2021-07-06 ENCOUNTER — TELEPHONE (OUTPATIENT)
Dept: TRANSPLANT | Facility: CLINIC | Age: 46
End: 2021-07-06

## 2021-07-06 DIAGNOSIS — Z12.31 ENCOUNTER FOR SCREENING MAMMOGRAM FOR MALIGNANT NEOPLASM OF BREAST: ICD-10-CM

## 2021-07-06 DIAGNOSIS — Z76.82 ORGAN TRANSPLANT CANDIDATE: Primary | ICD-10-CM

## 2021-07-07 ENCOUNTER — TELEPHONE (OUTPATIENT)
Dept: TRANSPLANT | Facility: CLINIC | Age: 46
End: 2021-07-07

## 2021-08-04 ENCOUNTER — TELEPHONE (OUTPATIENT)
Dept: RADIOLOGY | Facility: HOSPITAL | Age: 46
End: 2021-08-04

## 2021-08-05 ENCOUNTER — HOSPITAL ENCOUNTER (OUTPATIENT)
Dept: RADIOLOGY | Facility: HOSPITAL | Age: 46
Discharge: HOME | End: 2021-08-05
Attending: NURSE PRACTITIONER
Payer: MEDICARE

## 2021-08-05 VITALS — WEIGHT: 177.69 LBS | HEIGHT: 65 IN | BODY MASS INDEX: 29.61 KG/M2

## 2021-08-05 DIAGNOSIS — Z12.31 ENCOUNTER FOR SCREENING MAMMOGRAM FOR MALIGNANT NEOPLASM OF BREAST: ICD-10-CM

## 2021-08-05 DIAGNOSIS — Z76.82 ORGAN TRANSPLANT CANDIDATE: ICD-10-CM

## 2021-08-05 PROCEDURE — 77063 BREAST TOMOSYNTHESIS BI: CPT | Mod: 26,TXP,, | Performed by: RADIOLOGY

## 2021-08-05 PROCEDURE — 71046 XR CHEST PA AND LATERAL: ICD-10-PCS | Mod: 26,TXP,, | Performed by: RADIOLOGY

## 2021-08-05 PROCEDURE — 71046 X-RAY EXAM CHEST 2 VIEWS: CPT | Mod: TC,TXP

## 2021-08-05 PROCEDURE — 77063 MAMMO DIGITAL SCREENING BILAT WITH TOMO: ICD-10-PCS | Mod: 26,TXP,, | Performed by: RADIOLOGY

## 2021-08-05 PROCEDURE — 77067 SCR MAMMO BI INCL CAD: CPT | Mod: 26,TXP,, | Performed by: RADIOLOGY

## 2021-08-05 PROCEDURE — 71046 X-RAY EXAM CHEST 2 VIEWS: CPT | Mod: 26,TXP,, | Performed by: RADIOLOGY

## 2021-08-05 PROCEDURE — 77067 MAMMO DIGITAL SCREENING BILAT WITH TOMO: ICD-10-PCS | Mod: 26,TXP,, | Performed by: RADIOLOGY

## 2021-08-05 PROCEDURE — 76770 US RETROPERITONEAL COMPLETE: ICD-10-PCS | Mod: 26,TXP,, | Performed by: RADIOLOGY

## 2021-08-05 PROCEDURE — 77067 SCR MAMMO BI INCL CAD: CPT | Mod: TC,TXP

## 2021-08-05 PROCEDURE — 76770 US EXAM ABDO BACK WALL COMP: CPT | Mod: 26,TXP,, | Performed by: RADIOLOGY

## 2021-08-05 PROCEDURE — 76770 US EXAM ABDO BACK WALL COMP: CPT | Mod: TC,TXP

## 2021-08-20 ENCOUNTER — EPISODE CHANGES (OUTPATIENT)
Dept: TRANSPLANT | Facility: CLINIC | Age: 46
End: 2021-08-20

## 2021-08-26 ENCOUNTER — OFFICE VISIT (OUTPATIENT)
Dept: TRANSPLANT | Facility: CLINIC | Age: 46
End: 2021-08-26
Payer: MEDICARE

## 2021-08-26 VITALS
BODY MASS INDEX: 28.5 KG/M2 | SYSTOLIC BLOOD PRESSURE: 194 MMHG | RESPIRATION RATE: 24 BRPM | DIASTOLIC BLOOD PRESSURE: 82 MMHG | WEIGHT: 171.06 LBS | TEMPERATURE: 97 F | OXYGEN SATURATION: 95 % | HEIGHT: 65 IN | HEART RATE: 72 BPM

## 2021-08-26 DIAGNOSIS — N18.6 ESRD ON HEMODIALYSIS: ICD-10-CM

## 2021-08-26 DIAGNOSIS — Z99.2 ESRD ON HEMODIALYSIS: ICD-10-CM

## 2021-08-26 DIAGNOSIS — N25.81 SECONDARY HYPERPARATHYROIDISM: ICD-10-CM

## 2021-08-26 DIAGNOSIS — I21.4 NON-ST ELEVATION MI (NSTEMI): ICD-10-CM

## 2021-08-26 DIAGNOSIS — Z95.1 S/P CABG X 5: ICD-10-CM

## 2021-08-26 DIAGNOSIS — E11.65 UNCONTROLLED TYPE 2 DIABETES MELLITUS WITH HYPERGLYCEMIA: ICD-10-CM

## 2021-08-26 DIAGNOSIS — Z76.82 PATIENT ON WAITING LIST FOR KIDNEY TRANSPLANT: Primary | ICD-10-CM

## 2021-08-26 PROCEDURE — 99999 PR PBB SHADOW E&M-EST. PATIENT-LVL V: CPT | Mod: PBBFAC,TXP,, | Performed by: NURSE PRACTITIONER

## 2021-08-26 PROCEDURE — 99999 PR PBB SHADOW E&M-EST. PATIENT-LVL V: ICD-10-PCS | Mod: PBBFAC,TXP,, | Performed by: NURSE PRACTITIONER

## 2021-08-26 PROCEDURE — 99215 OFFICE O/P EST HI 40 MIN: CPT | Mod: PBBFAC,TXP | Performed by: NURSE PRACTITIONER

## 2021-08-26 PROCEDURE — 99214 PR OFFICE/OUTPT VISIT, EST, LEVL IV, 30-39 MIN: ICD-10-PCS | Mod: S$PBB,TXP,, | Performed by: NURSE PRACTITIONER

## 2021-08-26 PROCEDURE — 99214 OFFICE O/P EST MOD 30 MIN: CPT | Mod: S$PBB,TXP,, | Performed by: NURSE PRACTITIONER

## 2021-08-26 RX ORDER — FERRIC CITRATE 210 MG/1
3 TABLET, COATED ORAL 3 TIMES DAILY
COMMUNITY
Start: 2021-05-24 | End: 2022-07-10 | Stop reason: CLARIF

## 2021-08-26 RX ORDER — IRON SUCROSE 20 MG/ML
INJECTION, SOLUTION INTRAVENOUS
COMMUNITY
Start: 2021-07-23 | End: 2021-09-24 | Stop reason: CLARIF

## 2021-08-26 RX ORDER — METOCLOPRAMIDE 5 MG/1
5 TABLET ORAL
Status: ON HOLD | COMMUNITY
Start: 2021-07-28 | End: 2022-05-10 | Stop reason: HOSPADM

## 2021-09-17 ENCOUNTER — COMMITTEE REVIEW (OUTPATIENT)
Dept: TRANSPLANT | Facility: CLINIC | Age: 46
End: 2021-09-17

## 2021-09-24 PROBLEM — E66.09 OTHER OBESITY DUE TO EXCESS CALORIES: Status: ACTIVE | Noted: 2019-08-14

## 2021-09-24 PROBLEM — N18.9 ACUTE KIDNEY INJURY SUPERIMPOSED ON CKD: Status: ACTIVE | Noted: 2019-12-03

## 2021-09-24 PROBLEM — N18.9 HISTORY OF ANEMIA DUE TO CKD: Status: ACTIVE | Noted: 2019-12-18

## 2021-09-24 PROBLEM — I10 ELEVATED BLOOD PRESSURE READING WITH DIAGNOSIS OF HYPERTENSION: Status: ACTIVE | Noted: 2019-12-03

## 2021-09-24 PROBLEM — Z99.2 ESRD NEEDING DIALYSIS: Status: ACTIVE | Noted: 2018-08-14

## 2021-09-24 PROBLEM — E87.70 FLUID OVERLOAD, UNSPECIFIED: Status: ACTIVE | Noted: 2019-09-17

## 2021-09-24 PROBLEM — N18.6 ESRD NEEDING DIALYSIS: Status: ACTIVE | Noted: 2018-08-14

## 2021-09-24 PROBLEM — I16.1 HYPERTENSIVE EMERGENCY: Status: ACTIVE | Noted: 2019-12-03

## 2021-09-24 PROBLEM — I12.0 HYPERTENSIVE CHRONIC KIDNEY DISEASE WITH STAGE 5 CHRONIC KIDNEY DISEASE OR END STAGE RENAL DISEASE: Status: ACTIVE | Noted: 2018-01-17

## 2021-09-24 PROBLEM — N25.81 SECONDARY HYPERPARATHYROIDISM OF RENAL ORIGIN: Status: ACTIVE | Noted: 2018-01-17

## 2021-09-24 PROBLEM — I48.20 CHRONIC ATRIAL FIBRILLATION, UNSPECIFIED: Status: ACTIVE | Noted: 2018-01-17

## 2021-09-24 PROBLEM — R79.89 ELEVATED D-DIMER: Status: ACTIVE | Noted: 2019-12-03

## 2021-09-24 PROBLEM — D50.9 IRON DEFICIENCY ANEMIA, UNSPECIFIED: Status: ACTIVE | Noted: 2018-01-17

## 2021-09-24 PROBLEM — E83.39 HYPERPHOSPHATEMIA: Status: ACTIVE | Noted: 2019-12-18

## 2021-09-24 PROBLEM — Z78.9 POOR HISTORIAN: Status: ACTIVE | Noted: 2019-12-03

## 2021-09-24 PROBLEM — E44.1 MILD PROTEIN-CALORIE MALNUTRITION: Status: ACTIVE | Noted: 2018-01-17

## 2021-09-24 PROBLEM — E11.65 TYPE 2 DIABETES MELLITUS WITH HYPERGLYCEMIA: Status: ACTIVE | Noted: 2019-12-03

## 2021-09-24 PROBLEM — E87.8 OTHER DISORDERS OF ELECTROLYTE AND FLUID BALANCE, NOT ELSEWHERE CLASSIFIED: Status: ACTIVE | Noted: 2018-01-17

## 2021-09-24 PROBLEM — E11.9 TYPE 2 DIABETES MELLITUS: Status: ACTIVE | Noted: 2018-01-17

## 2021-09-24 PROBLEM — R33.8 OTHER RETENTION OF URINE: Status: ACTIVE | Noted: 2018-03-29

## 2021-09-24 PROBLEM — R51.9 HEADACHE, UNSPECIFIED: Status: ACTIVE | Noted: 2018-03-10

## 2021-09-24 PROBLEM — M89.9 DISORDER OF BONE, UNSPECIFIED: Status: ACTIVE | Noted: 2018-01-18

## 2021-09-24 PROBLEM — Z86.2 HISTORY OF ANEMIA DUE TO CKD: Status: ACTIVE | Noted: 2019-12-18

## 2021-09-24 PROBLEM — D63.1 ANEMIA IN CHRONIC KIDNEY DISEASE: Status: ACTIVE | Noted: 2018-01-17

## 2021-09-24 PROBLEM — E78.5 HYPERLIPIDEMIA, UNSPECIFIED: Status: ACTIVE | Noted: 2019-10-28

## 2021-09-24 PROBLEM — R94.31 ABNORMAL EKG: Status: ACTIVE | Noted: 2019-12-03

## 2021-09-24 PROBLEM — Z23 ENCOUNTER FOR IMMUNIZATION: Status: ACTIVE | Noted: 2018-09-26

## 2021-09-24 PROBLEM — Z78.9 DIFFICULT INTRAVENOUS ACCESS: Status: ACTIVE | Noted: 2019-12-03

## 2021-09-24 PROBLEM — E11.29 TYPE 2 DIABETES MELLITUS WITH OTHER DIABETIC KIDNEY COMPLICATION: Status: ACTIVE | Noted: 2018-01-17

## 2021-09-24 PROBLEM — Z99.2 DEPENDENCE ON RENAL DIALYSIS: Status: ACTIVE | Noted: 2018-01-17

## 2021-09-24 PROBLEM — N17.9 ACUTE KIDNEY INJURY SUPERIMPOSED ON CKD: Status: ACTIVE | Noted: 2019-12-03

## 2021-09-24 PROBLEM — D68.8 OTHER SPECIFIED COAGULATION DEFECTS: Status: ACTIVE | Noted: 2018-01-17

## 2021-09-24 PROBLEM — N18.6 END STAGE RENAL DISEASE: Status: ACTIVE | Noted: 2018-01-17

## 2021-09-24 PROBLEM — R06.02 SHORTNESS OF BREATH: Status: ACTIVE | Noted: 2019-12-02

## 2021-09-24 PROBLEM — K31.84 GASTROPARESIS: Status: ACTIVE | Noted: 2018-01-17

## 2021-09-24 PROBLEM — K21.9 GASTRO-ESOPHAGEAL REFLUX DISEASE WITHOUT ESOPHAGITIS: Status: ACTIVE | Noted: 2019-08-14

## 2021-09-24 PROBLEM — D64.9 ANEMIA: Status: ACTIVE | Noted: 2021-09-24

## 2021-09-24 PROBLEM — E03.9 HYPOTHYROIDISM, UNSPECIFIED: Status: ACTIVE | Noted: 2019-10-28

## 2021-09-24 PROBLEM — N18.9 ANEMIA IN CHRONIC KIDNEY DISEASE: Status: ACTIVE | Noted: 2018-01-17

## 2021-09-24 PROBLEM — I21.4 NON-ST ELEVATION (NSTEMI) MYOCARDIAL INFARCTION: Status: ACTIVE | Noted: 2019-01-28

## 2021-09-24 PROBLEM — E83.49 OTHER DISORDERS OF MAGNESIUM METABOLISM: Status: ACTIVE | Noted: 2018-01-17

## 2021-09-24 PROBLEM — E87.6 HYPOKALEMIA: Status: ACTIVE | Noted: 2021-09-24

## 2021-09-24 PROBLEM — I50.9 ACUTE EXACERBATION OF CHF (CONGESTIVE HEART FAILURE): Status: ACTIVE | Noted: 2019-12-02

## 2021-10-20 ENCOUNTER — HOSPITAL ENCOUNTER (EMERGENCY)
Facility: HOSPITAL | Age: 46
Discharge: HOME | End: 2021-10-20
Attending: EMERGENCY MEDICINE
Payer: MEDICARE

## 2021-10-20 VITALS
RESPIRATION RATE: 18 BRPM | WEIGHT: 176 LBS | DIASTOLIC BLOOD PRESSURE: 99 MMHG | HEIGHT: 65 IN | BODY MASS INDEX: 29.32 KG/M2 | OXYGEN SATURATION: 99 % | HEART RATE: 67 BPM | TEMPERATURE: 98 F | SYSTOLIC BLOOD PRESSURE: 204 MMHG

## 2021-10-20 DIAGNOSIS — S82.832A CLOSED FRACTURE OF DISTAL END OF LEFT FIBULA, UNSPECIFIED FRACTURE MORPHOLOGY, INITIAL ENCOUNTER: ICD-10-CM

## 2021-10-20 DIAGNOSIS — I10 HYPERTENSION, UNSPECIFIED TYPE: Primary | ICD-10-CM

## 2021-10-20 DIAGNOSIS — M25.569 KNEE PAIN: ICD-10-CM

## 2021-10-20 DIAGNOSIS — M54.2 ACUTE NECK PAIN: ICD-10-CM
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
## 2021-10-20 DIAGNOSIS — M25.572 ACUTE LEFT ANKLE PAIN: ICD-10-CM

## 2021-10-20 DIAGNOSIS — S80.211A ABRASION OF RIGHT KNEE, INITIAL ENCOUNTER: ICD-10-CM

## 2021-10-20 PROCEDURE — 29515 APPLICATION SHORT LEG SPLINT: CPT | Mod: LT,NTX

## 2021-10-20 PROCEDURE — 99284 EMERGENCY DEPT VISIT MOD MDM: CPT | Mod: 25,NTX

## 2021-10-20 PROCEDURE — 25000003 PHARM REV CODE 250: Mod: NTX | Performed by: EMERGENCY MEDICINE

## 2021-10-20 RX ORDER — MUPIROCIN 20 MG/G
1 OINTMENT TOPICAL
Status: COMPLETED | OUTPATIENT
Start: 2021-10-20 | End: 2021-10-20

## 2021-10-20 RX ORDER — HYDROCODONE BITARTRATE AND ACETAMINOPHEN 5; 325 MG/1; MG/1
1 TABLET ORAL
Status: COMPLETED | OUTPATIENT
Start: 2021-10-20 | End: 2021-10-20

## 2021-10-20 RX ORDER — OXYCODONE AND ACETAMINOPHEN 5; 325 MG/1; MG/1
1 TABLET ORAL EVERY 6 HOURS PRN
Qty: 12 TABLET | Refills: 0 | Status: SHIPPED | OUTPATIENT
Start: 2021-10-20 | End: 2022-05-07 | Stop reason: CLARIF

## 2021-10-20 RX ORDER — OXYCODONE AND ACETAMINOPHEN 5; 325 MG/1; MG/1
1 TABLET ORAL
Status: COMPLETED | OUTPATIENT
Start: 2021-10-20 | End: 2021-10-20

## 2021-10-20 RX ORDER — OXYCODONE AND ACETAMINOPHEN 5; 325 MG/1; MG/1
1 TABLET ORAL EVERY 6 HOURS PRN
Qty: 12 TABLET | Refills: 0 | Status: SHIPPED | OUTPATIENT
Start: 2021-10-20 | End: 2021-10-20 | Stop reason: SDUPTHER

## 2021-10-20 RX ADMIN — OXYCODONE HYDROCHLORIDE AND ACETAMINOPHEN 1 TABLET: 5; 325 TABLET ORAL at 06:10

## 2021-10-20 RX ADMIN — MUPIROCIN 22 G: 20 OINTMENT TOPICAL at 06:10

## 2021-10-20 RX ADMIN — HYDROCODONE BITARTRATE AND ACETAMINOPHEN 1 TABLET: 5; 325 TABLET ORAL at 04:10

## 2021-10-23 ENCOUNTER — OFFICE VISIT (OUTPATIENT)
Dept: ORTHOPEDICS | Facility: CLINIC | Age: 46
End: 2021-10-23
Payer: MEDICARE

## 2021-10-23 VITALS — WEIGHT: 176 LBS | RESPIRATION RATE: 18 BRPM | HEIGHT: 65 IN | BODY MASS INDEX: 29.32 KG/M2

## 2021-10-23 DIAGNOSIS — S82.62XA CLOSED AVULSION FRACTURE OF LATERAL MALLEOLUS OF LEFT FIBULA, INITIAL ENCOUNTER: Primary | ICD-10-CM

## 2021-10-23 PROCEDURE — 99203 OFFICE O/P NEW LOW 30 MIN: CPT | Mod: S$PBB,,, | Performed by: ORTHOPAEDIC SURGERY

## 2021-10-23 PROCEDURE — 97760 PR ORTHOTIC MGMT&TRAINJ INITIAL ENC EA 15 MINS: ICD-10-PCS | Mod: GP,S$PBB,, | Performed by: ORTHOPAEDIC SURGERY

## 2021-10-23 PROCEDURE — 99214 OFFICE O/P EST MOD 30 MIN: CPT | Mod: PBBFAC,PN | Performed by: ORTHOPAEDIC SURGERY

## 2021-10-23 PROCEDURE — 99999 PR PBB SHADOW E&M-EST. PATIENT-LVL IV: ICD-10-PCS | Mod: PBBFAC,,, | Performed by: ORTHOPAEDIC SURGERY

## 2021-10-23 PROCEDURE — 97760 ORTHOTIC MGMT&TRAING 1ST ENC: CPT | Mod: GP,S$PBB,, | Performed by: ORTHOPAEDIC SURGERY

## 2021-10-23 PROCEDURE — 99203 PR OFFICE/OUTPT VISIT, NEW, LEVL III, 30-44 MIN: ICD-10-PCS | Mod: S$PBB,,, | Performed by: ORTHOPAEDIC SURGERY

## 2021-10-23 PROCEDURE — 99999 PR PBB SHADOW E&M-EST. PATIENT-LVL IV: CPT | Mod: PBBFAC,,, | Performed by: ORTHOPAEDIC SURGERY

## 2021-11-16 DIAGNOSIS — M25.572 LEFT ANKLE PAIN, UNSPECIFIED CHRONICITY: Primary | ICD-10-CM

## 2021-11-17 ENCOUNTER — OFFICE VISIT (OUTPATIENT)
Dept: ORTHOPEDICS | Facility: CLINIC | Age: 46
End: 2021-11-17
Payer: MEDICARE

## 2021-11-17 ENCOUNTER — HOSPITAL ENCOUNTER (OUTPATIENT)
Dept: RADIOLOGY | Facility: HOSPITAL | Age: 46
Discharge: HOME | End: 2021-11-17
Attending: ORTHOPAEDIC SURGERY
Payer: MEDICARE

## 2021-11-17 VITALS — BODY MASS INDEX: 29.32 KG/M2 | WEIGHT: 176 LBS | HEIGHT: 65 IN | RESPIRATION RATE: 16 BRPM

## 2021-11-17 DIAGNOSIS — M25.572 LEFT ANKLE PAIN, UNSPECIFIED CHRONICITY: Primary | ICD-10-CM

## 2021-11-17 DIAGNOSIS — S82.62XD CLOSED AVULSION FRACTURE OF LATERAL MALLEOLUS OF LEFT FIBULA WITH ROUTINE HEALING, SUBSEQUENT ENCOUNTER: ICD-10-CM

## 2021-11-17 DIAGNOSIS — M25.572 LEFT ANKLE PAIN, UNSPECIFIED CHRONICITY: ICD-10-CM

## 2021-11-17 PROCEDURE — 99214 OFFICE O/P EST MOD 30 MIN: CPT | Mod: PBBFAC,PN | Performed by: ORTHOPAEDIC SURGERY

## 2021-11-17 PROCEDURE — 99213 OFFICE O/P EST LOW 20 MIN: CPT | Mod: S$PBB,,, | Performed by: ORTHOPAEDIC SURGERY

## 2021-11-17 PROCEDURE — 73610 XR ANKLE COMPLETE 3 VIEW LEFT: ICD-10-PCS | Mod: 26,LT,NTX, | Performed by: RADIOLOGY

## 2021-11-17 PROCEDURE — 73610 X-RAY EXAM OF ANKLE: CPT | Mod: 26,LT,NTX, | Performed by: RADIOLOGY

## 2021-11-17 PROCEDURE — 99999 PR PBB SHADOW E&M-EST. PATIENT-LVL IV: ICD-10-PCS | Mod: PBBFAC,,, | Performed by: ORTHOPAEDIC SURGERY

## 2021-11-17 PROCEDURE — 99213 PR OFFICE/OUTPT VISIT, EST, LEVL III, 20-29 MIN: ICD-10-PCS | Mod: S$PBB,,, | Performed by: ORTHOPAEDIC SURGERY

## 2021-11-17 PROCEDURE — 73610 X-RAY EXAM OF ANKLE: CPT | Mod: TC,PO,LT,NTX

## 2021-11-17 PROCEDURE — 99999 PR PBB SHADOW E&M-EST. PATIENT-LVL IV: CPT | Mod: PBBFAC,,, | Performed by: ORTHOPAEDIC SURGERY

## 2021-11-24 ENCOUNTER — OFFICE VISIT (OUTPATIENT)
Dept: CARDIOLOGY | Facility: CLINIC | Age: 46
End: 2021-11-24
Payer: MEDICARE

## 2021-11-24 VITALS
SYSTOLIC BLOOD PRESSURE: 188 MMHG | DIASTOLIC BLOOD PRESSURE: 89 MMHG | BODY MASS INDEX: 28.4 KG/M2 | WEIGHT: 170.44 LBS | HEIGHT: 65 IN | HEART RATE: 81 BPM

## 2021-11-24 DIAGNOSIS — E78.2 MIXED HYPERLIPIDEMIA: ICD-10-CM

## 2021-11-24 DIAGNOSIS — I48.0 PAROXYSMAL ATRIAL FIBRILLATION: ICD-10-CM

## 2021-11-24 DIAGNOSIS — N18.6 ESRD (END STAGE RENAL DISEASE) ON DIALYSIS: ICD-10-CM

## 2021-11-24 DIAGNOSIS — Z95.1 S/P CABG X 5: ICD-10-CM

## 2021-11-24 DIAGNOSIS — Z99.2 ESRD (END STAGE RENAL DISEASE) ON DIALYSIS: ICD-10-CM

## 2021-11-24 DIAGNOSIS — I10 ESSENTIAL HYPERTENSION: ICD-10-CM

## 2021-11-24 DIAGNOSIS — I25.10 CORONARY ARTERY DISEASE INVOLVING NATIVE CORONARY ARTERY OF NATIVE HEART WITHOUT ANGINA PECTORIS: Primary | ICD-10-CM

## 2021-11-24 PROCEDURE — 99999 PR PBB SHADOW E&M-EST. PATIENT-LVL III: ICD-10-PCS | Mod: PBBFAC,TXP,, | Performed by: INTERNAL MEDICINE

## 2021-11-24 PROCEDURE — 99999 PR PBB SHADOW E&M-EST. PATIENT-LVL III: CPT | Mod: PBBFAC,TXP,, | Performed by: INTERNAL MEDICINE

## 2021-11-24 PROCEDURE — 99214 OFFICE O/P EST MOD 30 MIN: CPT | Mod: S$PBB,NTX,, | Performed by: INTERNAL MEDICINE

## 2021-11-24 PROCEDURE — 99214 PR OFFICE/OUTPT VISIT, EST, LEVL IV, 30-39 MIN: ICD-10-PCS | Mod: S$PBB,NTX,, | Performed by: INTERNAL MEDICINE

## 2021-11-24 PROCEDURE — 99213 OFFICE O/P EST LOW 20 MIN: CPT | Mod: PBBFAC,PO,NTX | Performed by: INTERNAL MEDICINE

## 2022-01-01 ENCOUNTER — TELEPHONE (OUTPATIENT)
Dept: SURGERY | Facility: HOSPITAL | Age: 47
End: 2022-01-01
Payer: MEDICARE

## 2022-01-01 ENCOUNTER — PATIENT OUTREACH (OUTPATIENT)
Dept: ADMINISTRATIVE | Facility: CLINIC | Age: 47
End: 2022-01-01
Payer: MEDICARE

## 2022-01-01 ENCOUNTER — ANESTHESIA EVENT (OUTPATIENT)
Dept: ENDOSCOPY | Facility: HOSPITAL | Age: 47
End: 2022-01-01
Payer: MEDICARE

## 2022-01-01 ENCOUNTER — TELEPHONE (OUTPATIENT)
Dept: GASTROENTEROLOGY | Facility: CLINIC | Age: 47
End: 2022-01-01
Payer: MEDICARE

## 2022-01-01 ENCOUNTER — HOSPITAL ENCOUNTER (OUTPATIENT)
Facility: HOSPITAL | Age: 47
Discharge: HOME | End: 2022-09-01
Attending: INTERNAL MEDICINE | Admitting: INTERNAL MEDICINE
Payer: MEDICARE

## 2022-01-01 ENCOUNTER — HOSPITAL ENCOUNTER (INPATIENT)
Facility: HOSPITAL | Age: 47
LOS: 4 days | Discharge: HOME | DRG: 252 | End: 2022-11-13
Attending: INTERNAL MEDICINE | Admitting: INTERNAL MEDICINE
Payer: MEDICARE

## 2022-01-01 ENCOUNTER — ANESTHESIA (OUTPATIENT)
Dept: ENDOSCOPY | Facility: HOSPITAL | Age: 47
End: 2022-01-01
Payer: MEDICARE

## 2022-01-01 VITALS
RESPIRATION RATE: 18 BRPM | DIASTOLIC BLOOD PRESSURE: 72 MMHG | SYSTOLIC BLOOD PRESSURE: 155 MMHG | BODY MASS INDEX: 29.66 KG/M2 | TEMPERATURE: 98 F | WEIGHT: 178 LBS | HEART RATE: 61 BPM | OXYGEN SATURATION: 98 % | HEIGHT: 65 IN

## 2022-01-01 VITALS
DIASTOLIC BLOOD PRESSURE: 77 MMHG | BODY MASS INDEX: 30.23 KG/M2 | HEART RATE: 82 BPM | WEIGHT: 181.44 LBS | OXYGEN SATURATION: 97 % | SYSTOLIC BLOOD PRESSURE: 182 MMHG | TEMPERATURE: 98 F | HEIGHT: 65 IN | RESPIRATION RATE: 18 BRPM

## 2022-01-01 DIAGNOSIS — T82.9XXA COMPLICATION OF ARTERIOVENOUS DIALYSIS FISTULA, INITIAL ENCOUNTER: ICD-10-CM

## 2022-01-01 DIAGNOSIS — Z12.11 SCREEN FOR COLON CANCER: ICD-10-CM

## 2022-01-01 DIAGNOSIS — T82.590A DIALYSIS AV FISTULA MALFUNCTION: ICD-10-CM

## 2022-01-01 DIAGNOSIS — T82.9XXA COMPLICATION OF AV DIALYSIS FISTULA: Primary | ICD-10-CM

## 2022-01-01 LAB
ALBUMIN SERPL BCP-MCNC: 3.2 G/DL (ref 3.5–5.2)
ALBUMIN SERPL BCP-MCNC: 3.3 G/DL (ref 3.5–5.2)
ALP SERPL-CCNC: 194 U/L (ref 55–135)
ALP SERPL-CCNC: 200 U/L (ref 55–135)
ALT SERPL W/O P-5'-P-CCNC: 66 U/L (ref 10–44)
ALT SERPL W/O P-5'-P-CCNC: 68 U/L (ref 10–44)
ANION GAP SERPL CALC-SCNC: 13 MMOL/L (ref 8–16)
ANION GAP SERPL CALC-SCNC: 18 MMOL/L (ref 8–16)
ANION GAP SERPL CALC-SCNC: 20 MMOL/L (ref 8–16)
AST SERPL-CCNC: 78 U/L (ref 10–40)
AST SERPL-CCNC: 82 U/L (ref 10–40)
BASOPHILS # BLD AUTO: 0.05 K/UL (ref 0–0.2)
BASOPHILS # BLD AUTO: 0.06 K/UL (ref 0–0.2)
BASOPHILS # BLD AUTO: 0.08 K/UL (ref 0–0.2)
BASOPHILS NFR BLD: 0.6 % (ref 0–1.9)
BASOPHILS NFR BLD: 1.2 % (ref 0–1.9)
BASOPHILS NFR BLD: 1.5 % (ref 0–1.9)
BILIRUB SERPL-MCNC: 1.1 MG/DL (ref 0.1–1)
BILIRUB SERPL-MCNC: 1.4 MG/DL (ref 0.1–1)
BNP SERPL-MCNC: 1422 PG/ML (ref 0–99)
BUN SERPL-MCNC: 34 MG/DL (ref 6–20)
BUN SERPL-MCNC: 40 MG/DL (ref 6–20)
BUN SERPL-MCNC: 59 MG/DL (ref 6–20)
CALCIUM SERPL-MCNC: 7.8 MG/DL (ref 8.7–10.5)
CALCIUM SERPL-MCNC: 8 MG/DL (ref 8.7–10.5)
CALCIUM SERPL-MCNC: 8.4 MG/DL (ref 8.7–10.5)
CHLORIDE SERPL-SCNC: 91 MMOL/L (ref 95–110)
CHLORIDE SERPL-SCNC: 92 MMOL/L (ref 95–110)
CHLORIDE SERPL-SCNC: 93 MMOL/L (ref 95–110)
CO2 SERPL-SCNC: 13 MMOL/L (ref 23–29)
CO2 SERPL-SCNC: 19 MMOL/L (ref 23–29)
CO2 SERPL-SCNC: 21 MMOL/L (ref 23–29)
CREAT SERPL-MCNC: 5.5 MG/DL (ref 0.5–1.4)
CREAT SERPL-MCNC: 6.1 MG/DL (ref 0.5–1.4)
CREAT SERPL-MCNC: 8 MG/DL (ref 0.5–1.4)
DIFFERENTIAL METHOD: ABNORMAL
EOSINOPHIL # BLD AUTO: 0.1 K/UL (ref 0–0.5)
EOSINOPHIL # BLD AUTO: 0.2 K/UL (ref 0–0.5)
EOSINOPHIL # BLD AUTO: 0.2 K/UL (ref 0–0.5)
EOSINOPHIL NFR BLD: 1.1 % (ref 0–8)
EOSINOPHIL NFR BLD: 3 % (ref 0–8)
EOSINOPHIL NFR BLD: 3.6 % (ref 0–8)
ERYTHROCYTE [DISTWIDTH] IN BLOOD BY AUTOMATED COUNT: 17.9 % (ref 11.5–14.5)
ERYTHROCYTE [DISTWIDTH] IN BLOOD BY AUTOMATED COUNT: 18 % (ref 11.5–14.5)
ERYTHROCYTE [DISTWIDTH] IN BLOOD BY AUTOMATED COUNT: 18.1 % (ref 11.5–14.5)
EST. GFR  (NO RACE VARIABLE): 6 ML/MIN/1.73 M^2
EST. GFR  (NO RACE VARIABLE): 8 ML/MIN/1.73 M^2
EST. GFR  (NO RACE VARIABLE): 9 ML/MIN/1.73 M^2
GLUCOSE SERPL-MCNC: 111 MG/DL (ref 70–110)
GLUCOSE SERPL-MCNC: 120 MG/DL (ref 70–110)
GLUCOSE SERPL-MCNC: 158 MG/DL (ref 70–110)
GLUCOSE SERPL-MCNC: 194 MG/DL (ref 70–110)
HCT VFR BLD AUTO: 28.2 % (ref 37–48.5)
HCT VFR BLD AUTO: 30.3 % (ref 37–48.5)
HCT VFR BLD AUTO: 30.4 % (ref 37–48.5)
HGB BLD-MCNC: 10.2 G/DL (ref 12–16)
HGB BLD-MCNC: 10.3 G/DL (ref 12–16)
HGB BLD-MCNC: 9.5 G/DL (ref 12–16)
IMM GRANULOCYTES # BLD AUTO: 0.03 K/UL (ref 0–0.04)
IMM GRANULOCYTES # BLD AUTO: 0.04 K/UL (ref 0–0.04)
IMM GRANULOCYTES # BLD AUTO: 0.04 K/UL (ref 0–0.04)
IMM GRANULOCYTES NFR BLD AUTO: 0.5 % (ref 0–0.5)
IMM GRANULOCYTES NFR BLD AUTO: 0.6 % (ref 0–0.5)
IMM GRANULOCYTES NFR BLD AUTO: 0.8 % (ref 0–0.5)
LYMPHOCYTES # BLD AUTO: 0.5 K/UL (ref 1–4.8)
LYMPHOCYTES # BLD AUTO: 0.8 K/UL (ref 1–4.8)
LYMPHOCYTES # BLD AUTO: 0.9 K/UL (ref 1–4.8)
LYMPHOCYTES NFR BLD: 15.4 % (ref 18–48)
LYMPHOCYTES NFR BLD: 17.2 % (ref 18–48)
LYMPHOCYTES NFR BLD: 5.8 % (ref 18–48)
MCH RBC QN AUTO: 30.6 PG (ref 27–31)
MCH RBC QN AUTO: 30.8 PG (ref 27–31)
MCH RBC QN AUTO: 30.8 PG (ref 27–31)
MCHC RBC AUTO-ENTMCNC: 33.7 G/DL (ref 32–36)
MCHC RBC AUTO-ENTMCNC: 33.7 G/DL (ref 32–36)
MCHC RBC AUTO-ENTMCNC: 33.9 G/DL (ref 32–36)
MCV RBC AUTO: 91 FL (ref 82–98)
MCV RBC AUTO: 91 FL (ref 82–98)
MCV RBC AUTO: 92 FL (ref 82–98)
MONOCYTES # BLD AUTO: 0.6 K/UL (ref 0.3–1)
MONOCYTES # BLD AUTO: 0.8 K/UL (ref 0.3–1)
MONOCYTES # BLD AUTO: 0.8 K/UL (ref 0.3–1)
MONOCYTES NFR BLD: 11.8 % (ref 4–15)
MONOCYTES NFR BLD: 14.9 % (ref 4–15)
MONOCYTES NFR BLD: 9.7 % (ref 4–15)
NEUTROPHILS # BLD AUTO: 3.3 K/UL (ref 1.8–7.7)
NEUTROPHILS # BLD AUTO: 3.4 K/UL (ref 1.8–7.7)
NEUTROPHILS # BLD AUTO: 6.5 K/UL (ref 1.8–7.7)
NEUTROPHILS NFR BLD: 62.2 % (ref 38–73)
NEUTROPHILS NFR BLD: 67.8 % (ref 38–73)
NEUTROPHILS NFR BLD: 82.3 % (ref 38–73)
NRBC BLD-RTO: 0 /100 WBC
PLATELET # BLD AUTO: 102 K/UL (ref 150–450)
PLATELET # BLD AUTO: 73 K/UL (ref 150–450)
PLATELET # BLD AUTO: 74 K/UL (ref 150–450)
PLATELET BLD QL SMEAR: ABNORMAL
PMV BLD AUTO: 11.5 FL (ref 9.2–12.9)
PMV BLD AUTO: 11.9 FL (ref 9.2–12.9)
PMV BLD AUTO: ABNORMAL FL (ref 9.2–12.9)
POCT GLUCOSE: 103 MG/DL (ref 70–110)
POCT GLUCOSE: 106 MG/DL (ref 70–110)
POCT GLUCOSE: 107 MG/DL (ref 70–110)
POCT GLUCOSE: 117 MG/DL (ref 70–110)
POCT GLUCOSE: 130 MG/DL (ref 70–110)
POCT GLUCOSE: 133 MG/DL (ref 70–110)
POCT GLUCOSE: 142 MG/DL (ref 70–110)
POCT GLUCOSE: 172 MG/DL (ref 70–110)
POCT GLUCOSE: 179 MG/DL (ref 70–110)
POCT GLUCOSE: 184 MG/DL (ref 70–110)
POCT GLUCOSE: 185 MG/DL (ref 70–110)
POCT GLUCOSE: 188 MG/DL (ref 70–110)
POCT GLUCOSE: 230 MG/DL (ref 70–110)
POTASSIUM SERPL-SCNC: 4.6 MMOL/L (ref 3.5–5.1)
POTASSIUM SERPL-SCNC: 4.7 MMOL/L (ref 3.5–5.1)
POTASSIUM SERPL-SCNC: 5.9 MMOL/L (ref 3.5–5.1)
PROT SERPL-MCNC: 7.3 G/DL (ref 6–8.4)
PROT SERPL-MCNC: 7.6 G/DL (ref 6–8.4)
RBC # BLD AUTO: 3.1 M/UL (ref 4–5.4)
RBC # BLD AUTO: 3.31 M/UL (ref 4–5.4)
RBC # BLD AUTO: 3.34 M/UL (ref 4–5.4)
SODIUM SERPL-SCNC: 123 MMOL/L (ref 136–145)
SODIUM SERPL-SCNC: 127 MMOL/L (ref 136–145)
SODIUM SERPL-SCNC: 130 MMOL/L (ref 136–145)
WBC # BLD AUTO: 5.07 K/UL (ref 3.9–12.7)
WBC # BLD AUTO: 5.3 K/UL (ref 3.9–12.7)
WBC # BLD AUTO: 7.94 K/UL (ref 3.9–12.7)

## 2022-01-01 PROCEDURE — 25000003 PHARM REV CODE 250: Performed by: NURSE PRACTITIONER

## 2022-01-01 PROCEDURE — 27800903 OPTIME MED/SURG SUP & DEVICES OTHER IMPLANTS: Performed by: SURGERY

## 2022-01-01 PROCEDURE — 80053 COMPREHEN METABOLIC PANEL: CPT | Performed by: NURSE PRACTITIONER

## 2022-01-01 PROCEDURE — 63600175 PHARM REV CODE 636 W HCPCS: Performed by: SURGERY

## 2022-01-01 PROCEDURE — D9220A PRA ANESTHESIA: Mod: CRNA,,, | Performed by: NURSE ANESTHETIST, CERTIFIED REGISTERED

## 2022-01-01 PROCEDURE — 99233 PR SUBSEQUENT HOSPITAL CARE,LEVL III: ICD-10-PCS | Mod: ,,, | Performed by: INTERNAL MEDICINE

## 2022-01-01 PROCEDURE — 80100014 HC HEMODIALYSIS 1:1

## 2022-01-01 PROCEDURE — 82962 GLUCOSE BLOOD TEST: CPT | Mod: PO | Performed by: INTERNAL MEDICINE

## 2022-01-01 PROCEDURE — 25000003 PHARM REV CODE 250: Performed by: SURGERY

## 2022-01-01 PROCEDURE — 21400001 HC TELEMETRY ROOM

## 2022-01-01 PROCEDURE — 27201423 OPTIME MED/SURG SUP & DEVICES STERILE SUPPLY: Performed by: SURGERY

## 2022-01-01 PROCEDURE — D9220A PRA ANESTHESIA: ICD-10-PCS | Mod: ANES,,, | Performed by: ANESTHESIOLOGY

## 2022-01-01 PROCEDURE — 37000008 HC ANESTHESIA 1ST 15 MINUTES: Mod: PO | Performed by: INTERNAL MEDICINE

## 2022-01-01 PROCEDURE — 27000221 HC OXYGEN, UP TO 24 HOURS

## 2022-01-01 PROCEDURE — 36415 COLL VENOUS BLD VENIPUNCTURE: CPT | Performed by: NURSE PRACTITIONER

## 2022-01-01 PROCEDURE — 63600175 PHARM REV CODE 636 W HCPCS: Mod: PO | Performed by: NURSE ANESTHETIST, CERTIFIED REGISTERED

## 2022-01-01 PROCEDURE — 36415 COLL VENOUS BLD VENIPUNCTURE: CPT | Performed by: FAMILY MEDICINE

## 2022-01-01 PROCEDURE — G0121 COLON CA SCRN NOT HI RSK IND: ICD-10-PCS | Mod: ,,, | Performed by: INTERNAL MEDICINE

## 2022-01-01 PROCEDURE — 85025 COMPLETE CBC W/AUTO DIFF WBC: CPT | Performed by: STUDENT IN AN ORGANIZED HEALTH CARE EDUCATION/TRAINING PROGRAM

## 2022-01-01 PROCEDURE — 80048 BASIC METABOLIC PNL TOTAL CA: CPT | Performed by: FAMILY MEDICINE

## 2022-01-01 PROCEDURE — C1887 CATHETER, GUIDING: HCPCS | Performed by: SURGERY

## 2022-01-01 PROCEDURE — G0121 COLON CA SCRN NOT HI RSK IND: HCPCS | Mod: PO | Performed by: INTERNAL MEDICINE

## 2022-01-01 PROCEDURE — 99233 SBSQ HOSP IP/OBS HIGH 50: CPT | Mod: ,,, | Performed by: INTERNAL MEDICINE

## 2022-01-01 PROCEDURE — 90935 HEMODIALYSIS ONE EVALUATION: CPT | Mod: ,,, | Performed by: INTERNAL MEDICINE

## 2022-01-01 PROCEDURE — 25500020 PHARM REV CODE 255: Performed by: SURGERY

## 2022-01-01 PROCEDURE — 25000003 PHARM REV CODE 250: Performed by: STUDENT IN AN ORGANIZED HEALTH CARE EDUCATION/TRAINING PROGRAM

## 2022-01-01 PROCEDURE — 90935 PR HEMODIALYSIS, ONE EVALUATION: ICD-10-PCS | Mod: ,,, | Performed by: INTERNAL MEDICINE

## 2022-01-01 PROCEDURE — D9220A PRA ANESTHESIA: ICD-10-PCS | Mod: CRNA,,, | Performed by: NURSE ANESTHETIST, CERTIFIED REGISTERED

## 2022-01-01 PROCEDURE — 80100016 HC MAINTENANCE HEMODIALYSIS

## 2022-01-01 PROCEDURE — 25000003 PHARM REV CODE 250: Mod: PO | Performed by: INTERNAL MEDICINE

## 2022-01-01 PROCEDURE — C1894 INTRO/SHEATH, NON-LASER: HCPCS | Performed by: SURGERY

## 2022-01-01 PROCEDURE — 36415 COLL VENOUS BLD VENIPUNCTURE: CPT | Performed by: STUDENT IN AN ORGANIZED HEALTH CARE EDUCATION/TRAINING PROGRAM

## 2022-01-01 PROCEDURE — 27201247 HC HEMODIALYSIS, SET-UP & CANCEL

## 2022-01-01 PROCEDURE — G0121 COLON CA SCRN NOT HI RSK IND: HCPCS | Mod: ,,, | Performed by: INTERNAL MEDICINE

## 2022-01-01 PROCEDURE — 36902 INTRO CATH DIALYSIS CIRCUIT: CPT | Mod: LT | Performed by: SURGERY

## 2022-01-01 PROCEDURE — 25000003 PHARM REV CODE 250: Mod: PO | Performed by: ANESTHESIOLOGY

## 2022-01-01 PROCEDURE — 99152 MOD SED SAME PHYS/QHP 5/>YRS: CPT | Performed by: SURGERY

## 2022-01-01 PROCEDURE — 25000003 PHARM REV CODE 250: Performed by: FAMILY MEDICINE

## 2022-01-01 PROCEDURE — 37000009 HC ANESTHESIA EA ADD 15 MINS: Mod: PO | Performed by: INTERNAL MEDICINE

## 2022-01-01 PROCEDURE — 85025 COMPLETE CBC W/AUTO DIFF WBC: CPT | Performed by: FAMILY MEDICINE

## 2022-01-01 PROCEDURE — 94761 N-INVAS EAR/PLS OXIMETRY MLT: CPT

## 2022-01-01 PROCEDURE — 83880 ASSAY OF NATRIURETIC PEPTIDE: CPT | Performed by: STUDENT IN AN ORGANIZED HEALTH CARE EDUCATION/TRAINING PROGRAM

## 2022-01-01 PROCEDURE — D9220A PRA ANESTHESIA: Mod: ANES,,, | Performed by: ANESTHESIOLOGY

## 2022-01-01 PROCEDURE — 99900035 HC TECH TIME PER 15 MIN (STAT)

## 2022-01-01 PROCEDURE — 25000003 PHARM REV CODE 250: Mod: PO | Performed by: NURSE ANESTHETIST, CERTIFIED REGISTERED

## 2022-01-01 PROCEDURE — 25000003 PHARM REV CODE 250: Performed by: INTERNAL MEDICINE

## 2022-01-01 PROCEDURE — C1769 GUIDE WIRE: HCPCS | Performed by: SURGERY

## 2022-01-01 RX ORDER — LIDOCAINE HYDROCHLORIDE 20 MG/ML
INJECTION, SOLUTION EPIDURAL; INFILTRATION; INTRACAUDAL; PERINEURAL
Status: DISCONTINUED | OUTPATIENT
Start: 2022-01-01 | End: 2022-01-01

## 2022-01-01 RX ORDER — SUCRALFATE 1 G/10ML
1 SUSPENSION ORAL EVERY 6 HOURS
Status: DISCONTINUED | OUTPATIENT
Start: 2022-01-01 | End: 2022-01-01 | Stop reason: HOSPADM

## 2022-01-01 RX ORDER — SODIUM CHLORIDE 9 MG/ML
INJECTION, SOLUTION INTRAVENOUS
Status: CANCELLED | OUTPATIENT
Start: 2022-01-01

## 2022-01-01 RX ORDER — SODIUM CHLORIDE 9 MG/ML
INJECTION, SOLUTION INTRAVENOUS ONCE
Status: CANCELLED | OUTPATIENT
Start: 2022-01-01 | End: 2022-01-01

## 2022-01-01 RX ORDER — BUPROPION HYDROCHLORIDE 100 MG/1
100 TABLET, EXTENDED RELEASE ORAL 2 TIMES DAILY
Status: DISCONTINUED | OUTPATIENT
Start: 2022-01-01 | End: 2022-01-01 | Stop reason: HOSPADM

## 2022-01-01 RX ORDER — LOSARTAN POTASSIUM 25 MG/1
25 TABLET ORAL DAILY
Status: DISCONTINUED | OUTPATIENT
Start: 2022-01-01 | End: 2022-01-01 | Stop reason: HOSPADM

## 2022-01-01 RX ORDER — LIDOCAINE HYDROCHLORIDE 10 MG/ML
1 INJECTION, SOLUTION EPIDURAL; INFILTRATION; INTRACAUDAL; PERINEURAL ONCE
Status: COMPLETED | OUTPATIENT
Start: 2022-01-01 | End: 2022-01-01

## 2022-01-01 RX ORDER — GLUCAGON 1 MG
1 KIT INJECTION
Status: DISCONTINUED | OUTPATIENT
Start: 2022-01-01 | End: 2022-01-01 | Stop reason: HOSPADM

## 2022-01-01 RX ORDER — ONDANSETRON 4 MG/1
4 TABLET, FILM COATED ORAL EVERY 6 HOURS PRN
Status: DISCONTINUED | OUTPATIENT
Start: 2022-01-01 | End: 2022-01-01 | Stop reason: HOSPADM

## 2022-01-01 RX ORDER — SUCRALFATE 1 G/10ML
1 SUSPENSION ORAL EVERY 6 HOURS
Qty: 560 ML | Refills: 0 | Status: SHIPPED | OUTPATIENT
Start: 2022-01-01 | End: 2022-01-01

## 2022-01-01 RX ORDER — INSULIN ASPART 100 [IU]/ML
0-5 INJECTION, SOLUTION INTRAVENOUS; SUBCUTANEOUS EVERY 6 HOURS PRN
Status: DISCONTINUED | OUTPATIENT
Start: 2022-01-01 | End: 2022-01-01 | Stop reason: HOSPADM

## 2022-01-01 RX ORDER — ESCITALOPRAM OXALATE 10 MG/1
20 TABLET ORAL EVERY MORNING
Status: DISCONTINUED | OUTPATIENT
Start: 2022-01-01 | End: 2022-01-01 | Stop reason: HOSPADM

## 2022-01-01 RX ORDER — LIDOCAINE HYDROCHLORIDE 20 MG/ML
INJECTION INTRAVENOUS
Status: DISCONTINUED | OUTPATIENT
Start: 2022-01-01 | End: 2022-01-01

## 2022-01-01 RX ORDER — LOSARTAN POTASSIUM 25 MG/1
25 TABLET ORAL DAILY
Qty: 30 TABLET | Refills: 1 | Status: SHIPPED | OUTPATIENT
Start: 2022-01-01 | End: 2022-01-01

## 2022-01-01 RX ORDER — HYDRALAZINE HYDROCHLORIDE 20 MG/ML
10 INJECTION INTRAMUSCULAR; INTRAVENOUS EVERY 6 HOURS PRN
Status: DISCONTINUED | OUTPATIENT
Start: 2022-01-01 | End: 2022-01-01 | Stop reason: HOSPADM

## 2022-01-01 RX ORDER — PROPOFOL 10 MG/ML
VIAL (ML) INTRAVENOUS
Status: DISCONTINUED | OUTPATIENT
Start: 2022-01-01 | End: 2022-01-01

## 2022-01-01 RX ORDER — FENTANYL CITRATE 50 UG/ML
INJECTION, SOLUTION INTRAMUSCULAR; INTRAVENOUS
Status: DISCONTINUED | OUTPATIENT
Start: 2022-01-01 | End: 2022-01-01

## 2022-01-01 RX ORDER — PANTOPRAZOLE SODIUM 40 MG/1
40 TABLET, DELAYED RELEASE ORAL 2 TIMES DAILY
Status: DISCONTINUED | OUTPATIENT
Start: 2022-01-01 | End: 2022-01-01 | Stop reason: HOSPADM

## 2022-01-01 RX ORDER — CALCIUM GLUCONATE 98 MG/ML
1 INJECTION, SOLUTION INTRAVENOUS ONCE
Status: DISCONTINUED | OUTPATIENT
Start: 2022-01-01 | End: 2022-01-01

## 2022-01-01 RX ORDER — SODIUM CHLORIDE 0.9 % (FLUSH) 0.9 %
10 SYRINGE (ML) INJECTION
Status: DISCONTINUED | OUTPATIENT
Start: 2022-01-01 | End: 2022-01-01 | Stop reason: HOSPADM

## 2022-01-01 RX ORDER — CARVEDILOL 6.25 MG/1
6.25 TABLET ORAL 2 TIMES DAILY
Status: DISCONTINUED | OUTPATIENT
Start: 2022-01-01 | End: 2022-01-01 | Stop reason: HOSPADM

## 2022-01-01 RX ORDER — HYDROCODONE BITARTRATE AND ACETAMINOPHEN 5; 325 MG/1; MG/1
1 TABLET ORAL EVERY 4 HOURS PRN
Status: DISCONTINUED | OUTPATIENT
Start: 2022-01-01 | End: 2022-01-01 | Stop reason: HOSPADM

## 2022-01-01 RX ORDER — SODIUM CHLORIDE, SODIUM LACTATE, POTASSIUM CHLORIDE, CALCIUM CHLORIDE 600; 310; 30; 20 MG/100ML; MG/100ML; MG/100ML; MG/100ML
INJECTION, SOLUTION INTRAVENOUS CONTINUOUS
Status: DISCONTINUED | OUTPATIENT
Start: 2022-01-01 | End: 2022-01-01

## 2022-01-01 RX ORDER — SODIUM CHLORIDE 9 MG/ML
INJECTION, SOLUTION INTRAVENOUS CONTINUOUS
Status: DISCONTINUED | OUTPATIENT
Start: 2022-01-01 | End: 2022-01-01 | Stop reason: HOSPADM

## 2022-01-01 RX ORDER — PANTOPRAZOLE SODIUM 40 MG/1
40 TABLET, DELAYED RELEASE ORAL 2 TIMES DAILY
Qty: 60 TABLET | Refills: 1 | Status: SHIPPED | OUTPATIENT
Start: 2022-01-01 | End: 2022-01-01

## 2022-01-01 RX ORDER — HEPARIN SODIUM 1000 [USP'U]/ML
INJECTION, SOLUTION INTRAVENOUS; SUBCUTANEOUS
Status: DISCONTINUED | OUTPATIENT
Start: 2022-01-01 | End: 2022-01-01

## 2022-01-01 RX ORDER — SODIUM CHLORIDE 9 MG/ML
INJECTION, SOLUTION INTRAVENOUS CONTINUOUS
Status: DISCONTINUED | OUTPATIENT
Start: 2022-01-01 | End: 2022-01-01

## 2022-01-01 RX ORDER — MUPIROCIN 20 MG/G
OINTMENT TOPICAL 2 TIMES DAILY
Status: DISCONTINUED | OUTPATIENT
Start: 2022-01-01 | End: 2022-01-01 | Stop reason: HOSPADM

## 2022-01-01 RX ORDER — MIDAZOLAM HYDROCHLORIDE 1 MG/ML
INJECTION, SOLUTION INTRAMUSCULAR; INTRAVENOUS
Status: DISCONTINUED | OUTPATIENT
Start: 2022-01-01 | End: 2022-01-01

## 2022-01-01 RX ORDER — ACETAMINOPHEN 325 MG/1
650 TABLET ORAL EVERY 6 HOURS PRN
Status: DISCONTINUED | OUTPATIENT
Start: 2022-01-01 | End: 2022-01-01 | Stop reason: HOSPADM

## 2022-01-01 RX ORDER — PANTOPRAZOLE SODIUM 40 MG/1
40 TABLET, DELAYED RELEASE ORAL DAILY
Status: DISCONTINUED | OUTPATIENT
Start: 2022-01-01 | End: 2022-01-01

## 2022-01-01 RX ORDER — LOSARTAN POTASSIUM 50 MG/1
100 TABLET ORAL DAILY
Status: DISCONTINUED | OUTPATIENT
Start: 2022-01-01 | End: 2022-01-01

## 2022-01-01 RX ORDER — METOCLOPRAMIDE 5 MG/1
5 TABLET ORAL EVERY 6 HOURS
Status: DISCONTINUED | OUTPATIENT
Start: 2022-01-01 | End: 2022-01-01 | Stop reason: HOSPADM

## 2022-01-01 RX ORDER — CLONIDINE HYDROCHLORIDE 0.2 MG/1
0.2 TABLET ORAL ONCE
Status: COMPLETED | OUTPATIENT
Start: 2022-01-01 | End: 2022-01-01

## 2022-01-01 RX ADMIN — BUPROPION HYDROCHLORIDE 100 MG: 100 TABLET, FILM COATED, EXTENDED RELEASE ORAL at 09:11

## 2022-01-01 RX ADMIN — SODIUM CHLORIDE: 0.9 INJECTION, SOLUTION INTRAVENOUS at 12:11

## 2022-01-01 RX ADMIN — METOCLOPRAMIDE 5 MG: 5 TABLET ORAL at 09:11

## 2022-01-01 RX ADMIN — PROPOFOL 30 MG: 10 INJECTION, EMULSION INTRAVENOUS at 07:09

## 2022-01-01 RX ADMIN — SODIUM CHLORIDE: 0.9 INJECTION, SOLUTION INTRAVENOUS at 06:09

## 2022-01-01 RX ADMIN — METOCLOPRAMIDE 5 MG: 5 TABLET ORAL at 05:11

## 2022-01-01 RX ADMIN — BUPROPION HYDROCHLORIDE 100 MG: 100 TABLET, FILM COATED, EXTENDED RELEASE ORAL at 08:11

## 2022-01-01 RX ADMIN — LOSARTAN POTASSIUM 100 MG: 50 TABLET, FILM COATED ORAL at 08:11

## 2022-01-01 RX ADMIN — SUCRALFATE 1 G: 1 SUSPENSION ORAL at 05:11

## 2022-01-01 RX ADMIN — PANTOPRAZOLE SODIUM 40 MG: 40 TABLET, DELAYED RELEASE ORAL at 08:11

## 2022-01-01 RX ADMIN — BUPROPION HYDROCHLORIDE 100 MG: 100 TABLET, FILM COATED, EXTENDED RELEASE ORAL at 12:11

## 2022-01-01 RX ADMIN — METOCLOPRAMIDE 5 MG: 5 TABLET ORAL at 11:11

## 2022-01-01 RX ADMIN — ONDANSETRON HYDROCHLORIDE 4 MG: 4 TABLET, FILM COATED ORAL at 12:11

## 2022-01-01 RX ADMIN — METOCLOPRAMIDE 5 MG: 5 TABLET ORAL at 10:11

## 2022-01-01 RX ADMIN — METOCLOPRAMIDE 5 MG: 5 TABLET ORAL at 06:11

## 2022-01-01 RX ADMIN — CARVEDILOL 6.25 MG: 6.25 TABLET, FILM COATED ORAL at 08:11

## 2022-01-01 RX ADMIN — MUPIROCIN: 20 OINTMENT TOPICAL at 08:11

## 2022-01-01 RX ADMIN — SUCRALFATE 1 G: 1 SUSPENSION ORAL at 11:11

## 2022-01-01 RX ADMIN — ESCITALOPRAM OXALATE 20 MG: 10 TABLET, FILM COATED ORAL at 06:11

## 2022-01-01 RX ADMIN — METOCLOPRAMIDE 5 MG: 5 TABLET ORAL at 12:11

## 2022-01-01 RX ADMIN — HYDROCODONE BITARTRATE AND ACETAMINOPHEN 1 TABLET: 5; 325 TABLET ORAL at 06:11

## 2022-01-01 RX ADMIN — INSULIN ASPART 2 UNITS: 100 INJECTION, SOLUTION INTRAVENOUS; SUBCUTANEOUS at 05:11

## 2022-01-01 RX ADMIN — LIDOCAINE HYDROCHLORIDE 50 MG: 20 INJECTION, SOLUTION INTRAVENOUS at 07:09

## 2022-01-01 RX ADMIN — LIDOCAINE HYDROCHLORIDE 1 MG: 10 INJECTION, SOLUTION EPIDURAL; INFILTRATION; INTRACAUDAL; PERINEURAL at 06:09

## 2022-01-01 RX ADMIN — HYDROCODONE BITARTRATE AND ACETAMINOPHEN 1 TABLET: 5; 325 TABLET ORAL at 03:11

## 2022-01-01 RX ADMIN — SUCRALFATE 1 G: 1 SUSPENSION ORAL at 06:11

## 2022-01-01 RX ADMIN — CARVEDILOL 6.25 MG: 6.25 TABLET, FILM COATED ORAL at 09:11

## 2022-01-01 RX ADMIN — HYDROCODONE BITARTRATE AND ACETAMINOPHEN 1 TABLET: 5; 325 TABLET ORAL at 11:11

## 2022-01-01 RX ADMIN — HYDRALAZINE HYDROCHLORIDE 10 MG: 20 INJECTION, SOLUTION INTRAMUSCULAR; INTRAVENOUS at 12:11

## 2022-01-01 RX ADMIN — HYDROCODONE BITARTRATE AND ACETAMINOPHEN 1 TABLET: 5; 325 TABLET ORAL at 10:11

## 2022-01-01 RX ADMIN — HYDROCODONE BITARTRATE AND ACETAMINOPHEN 1 TABLET: 5; 325 TABLET ORAL at 04:11

## 2022-01-01 RX ADMIN — LOSARTAN POTASSIUM 25 MG: 25 TABLET, FILM COATED ORAL at 12:11

## 2022-01-01 RX ADMIN — MUPIROCIN: 20 OINTMENT TOPICAL at 09:11

## 2022-01-01 RX ADMIN — SODIUM CHLORIDE: 0.9 INJECTION, SOLUTION INTRAVENOUS at 01:11

## 2022-01-01 RX ADMIN — CARVEDILOL 6.25 MG: 6.25 TABLET, FILM COATED ORAL at 12:11

## 2022-01-01 RX ADMIN — HYDRALAZINE HYDROCHLORIDE 10 MG: 20 INJECTION, SOLUTION INTRAMUSCULAR; INTRAVENOUS at 10:11

## 2022-01-01 RX ADMIN — MUPIROCIN: 20 OINTMENT TOPICAL at 10:11

## 2022-01-01 RX ADMIN — HYDROCODONE BITARTRATE AND ACETAMINOPHEN 1 TABLET: 5; 325 TABLET ORAL at 08:11

## 2022-01-01 RX ADMIN — CARVEDILOL 6.25 MG: 6.25 TABLET, FILM COATED ORAL at 10:11

## 2022-01-01 RX ADMIN — PANTOPRAZOLE SODIUM 40 MG: 40 TABLET, DELAYED RELEASE ORAL at 09:11

## 2022-01-01 RX ADMIN — ESCITALOPRAM OXALATE 20 MG: 10 TABLET, FILM COATED ORAL at 09:11

## 2022-01-01 RX ADMIN — ACETAMINOPHEN 650 MG: 325 TABLET ORAL at 06:11

## 2022-01-01 RX ADMIN — CLONIDINE HYDROCHLORIDE 0.2 MG: 0.2 TABLET ORAL at 01:11

## 2022-01-01 RX ADMIN — PROPOFOL 80 MG: 10 INJECTION, EMULSION INTRAVENOUS at 07:09

## 2022-01-01 RX ADMIN — PANTOPRAZOLE SODIUM 40 MG: 40 TABLET, DELAYED RELEASE ORAL at 10:11

## 2022-01-01 RX ADMIN — SUCRALFATE 1 G: 1 SUSPENSION ORAL at 12:11

## 2022-01-01 RX ADMIN — SODIUM ZIRCONIUM CYCLOSILICATE 10 G: 5 POWDER, FOR SUSPENSION ORAL at 05:11

## 2022-01-01 RX ADMIN — PANTOPRAZOLE SODIUM 40 MG: 40 TABLET, DELAYED RELEASE ORAL at 12:11

## 2022-01-01 RX ADMIN — BUPROPION HYDROCHLORIDE 100 MG: 100 TABLET, FILM COATED, EXTENDED RELEASE ORAL at 10:11

## 2022-01-01 RX ADMIN — SUCRALFATE 1 G: 1 SUSPENSION ORAL at 10:11

## 2022-05-08 PROBLEM — N18.6 DIABETES MELLITUS WITH ESRD (END-STAGE RENAL DISEASE): Status: ACTIVE | Noted: 2018-01-17

## 2022-05-08 PROBLEM — R79.89 ELEVATED TROPONIN LEVEL: Status: RESOLVED | Noted: 2020-11-11 | Resolved: 2022-05-08

## 2022-05-08 PROBLEM — E11.22 DIABETES MELLITUS WITH ESRD (END-STAGE RENAL DISEASE): Status: ACTIVE | Noted: 2018-01-17

## 2022-05-08 PROBLEM — E87.70 FLUID OVERLOAD, UNSPECIFIED: Status: RESOLVED | Noted: 2019-09-17 | Resolved: 2022-05-08

## 2022-05-10 PROBLEM — R00.1 BRADYCARDIA: Status: ACTIVE | Noted: 2022-05-10

## 2022-05-23 DIAGNOSIS — Z76.82 PRE-KIDNEY TRANSPLANT, LISTED: Primary | ICD-10-CM

## 2022-06-01 ENCOUNTER — TELEPHONE (OUTPATIENT)
Dept: GASTROENTEROLOGY | Facility: CLINIC | Age: 47
End: 2022-06-01
Payer: MEDICARE

## 2022-06-01 DIAGNOSIS — Z76.82 PRE-KIDNEY TRANSPLANT, LISTED: Primary | ICD-10-CM

## 2022-06-01 DIAGNOSIS — Z76.82 ORGAN TRANSPLANT CANDIDATE: Primary | ICD-10-CM

## 2022-06-01 DIAGNOSIS — Z12.11 COLON CANCER SCREENING: Primary | ICD-10-CM

## 2022-06-01 DIAGNOSIS — Z76.82 ORGAN TRANSPLANT CANDIDATE: ICD-10-CM

## 2022-06-01 NOTE — TELEPHONE ENCOUNTER
Pt scheduled for scope. Instructions mailed to home. Insulin instructions given.    Pt will need to hold Eliquis for 2 days prior to cscope scheduled Sept. 1st- is this acceptable?

## 2022-06-02 ENCOUNTER — TELEPHONE (OUTPATIENT)
Dept: TRANSPLANT | Facility: CLINIC | Age: 47
End: 2022-06-02
Payer: MEDICARE

## 2022-06-02 NOTE — TELEPHONE ENCOUNTER
----- Message from Amol Vang MD sent at 6/2/2022  2:19 PM CDT -----  Regarding: RE: CT review  I agree, she has prohibitive iliac arterial disease.      Amol  ----- Message -----  From: Puma Panda Jr., MD  Sent: 6/1/2022  12:59 PM CDT  To: Tyler Corrigan MD, Patricia Uribe, RN, #  Subject: RE: CT review                                    Diffuse circumferential iliac artery calcifications. I think prohibitive.   Sending to Dr. Corrigan and Dr. Vang.     DS  ----- Message -----  From: Patricia Uribe, RN  Sent: 6/1/2022   9:23 AM CDT  To: Puma Panda Jr., MD  Subject: CT review                                        Dr. Panda,    Per committee we wanted CT every 2 years. She had one on 5/7/22 by another provider. Please review films and advise. Thanks.    Patricia

## 2022-06-03 ENCOUNTER — TELEPHONE (OUTPATIENT)
Dept: TRANSPLANT | Facility: CLINIC | Age: 47
End: 2022-06-03
Payer: MEDICARE

## 2022-06-03 ENCOUNTER — COMMITTEE REVIEW (OUTPATIENT)
Dept: TRANSPLANT | Facility: CLINIC | Age: 47
End: 2022-06-03
Payer: MEDICARE

## 2022-06-03 NOTE — LETTER
Lucia 3, 2022    Gilda Schroeder  [id number] [place] [address]    Dear Gilda Schroeder:  MRN: [id number]    It is the duty of the [facility] Selection Committee to determine which patients are candidates for a transplant. For this reason, our committee has the difficult task of evaluating patients to determine which ones have the greatest chance of having a successful transplant. We are aware of the magnitude of this responsibility, and we approach it with reverence and humility.    Your current health status was reviewed at a recent selection committee meeting.  It is with regret I inform you that you are no longer a suitable transplant candidate because of severe iliac calcifications prohibiting the placement of a transplanted kidney. Your name has been removed from the wait list effective Lucia 3, 2022.    The [facility] Selection Committee carefully considers each patient's transplant candidacy and determines whether it is safe to proceed with transplantation on a case-by-case basis using established selection criteria.  In the past, you were considered to be a suitable transplant candidate.  At present, the risk of proceeding with an elective transplant surgery has become too high.                                                                                                 Although the selection committee believes you are not a suitable transplant candidate, you have the option to be evaluated at other transplant centers.  You may request your [facility] records be sent to any center of your choice by contacting our Medical Records Department at [phone].                                                                               Attached is a letter from the United Network for Organ Sharing (UNOS).  It describes the services and information offered to patients by UNOS and the Organ Procurement and Transplant Network.                                                                                                                                       The [facility] sincerely wishes you the best and remains available to answer any questions.  Please do not hesitate to contact our pre-transplant office if we can assist you in any other way.                                                                               Sincerely,      Manju Zhu MD  Medical Director, Kidney & Kidney/Pancreas Transplantation  lh/enclosure    Cc: Dr. Roselia Rose         [facility]             The Organ Procurement and Transplantation Network   Toll-free patient services line: Your resource for organ transplant information     If you have a question regarding your own medical care, you always should call your transplant hospital first. However, for general organ transplant-related information, you can call the Organ Procurement and Transplantation Network (OPTN) toll-free patient services line at [phone].     Anyone, including potential transplant candidates, candidates, recipients, family members, friends, living donors, and donor family members, can call this number to:     · Talk about organ donation, living donation, the transplant process, the donation process, and transplant policies.   · Get a free patient information kit with helpful booklets, waiting list and transplant information, and a list of all transplant hospitals.   · Ask questions about the OPTN website (https://optn.transplant.hrsa.gov/), the [employer] Network for Organ Sharings (UNOS) website (https://unos.org/), or the UNOS website for living donors and transplant recipients. (https://www.transplantliving.org/).   · Learn how the OPTN can help you.   · Talk about any concerns that you may have with a transplant hospital.     The nations transplant system, the OPTN, is managed under federal contract by the [employer] Network for Organ Sharing (UNOS), which is a non-profit  [facility] organization. The OPTN helps create and define organ sharing policies that make the best use of donated organs. This process continuously evaluating new advances and discoveries so policies can be adapted to best serve patients waiting for transplants. To do so, the OPTN works closely with transplant professionals, transplant patients, transplant candidates, donor families, living donors, and the public. All transplant programs and organ procurement organizations throughout the country are OPTN members and are obligated to follow the policies the OPTN creates for allocating organs.     The OPTN also is responsible for:   · Providing educational material for patients, the public, and professionals.   · Raising awareness of the need for donated organs and tissue.   · Coordinating organ procurement, matching, and placement.   · Collecting information about every organ transplant and donation that occurs in the [place].     Remember, you should contact your transplant hospital directly if you have questions or concerns about your own medical care including medical records, work-up progress, and test results.     We are not your transplant hospital, and our staff will not be able to answer questions about your case, so please keep your transplant hospitals phone number handy.   However, while you research your transplant needs and learn as much as you can about transplantation and donation, we welcome your call to our toll-free patient services line at [phone].

## 2022-06-03 NOTE — TELEPHONE ENCOUNTER
----- Message from Tyler Corrigan MD sent at 6/3/2022  4:03 PM CDT -----  Regarding: RE: CT review  Agree, not a candidate    ----- Message -----  From: Amol Vang MD  Sent: 6/2/2022   2:20 PM CDT  To: Tyler Corrigan MD, Patricia Uribe, RN, #  Subject: RE: CT review                                    I agree, she has prohibitive iliac arterial disease.      Amol  ----- Message -----  From: Puma Panda Jr., MD  Sent: 6/1/2022  12:59 PM CDT  To: Tyler Corrigan MD, Patricia Uribe, RN, #  Subject: RE: CT review                                    Diffuse circumferential iliac artery calcifications. I think prohibitive.   Sending to Dr. Corrigan and Dr. Vang.     DS  ----- Message -----  From: Patricia Uribe, RN  Sent: 6/1/2022   9:23 AM CDT  To: Puma Panda Jr., MD  Subject: CT review                                        Dr. Panda,    Per committee we wanted CT every 2 years. She had one on 5/7/22 by another provider. Please review films and advise. Thanks.    Patricia

## 2022-06-03 NOTE — TELEPHONE ENCOUNTER
Spoke to patient, explained committee review of CT films by Transplant Surgeons that she is no longer a surgical candidate for transplant due to severe iliac calcifications.

## 2022-06-03 NOTE — COMMITTEE REVIEW
Native Organ Dx: Hypertensive Nephrosclerosis      Not approved for LRD/CAD transplant due to severe iliac calcifications prohibiting the placement of a transplanted kidney. Patient will be removed from the wait list.       Note written by Patricia Uribe RN    ===============================================    I was present at the meeting and attest to the decision of the committee.    Brett Jaffe  06/03/2022

## 2022-06-15 ENCOUNTER — TELEPHONE (OUTPATIENT)
Dept: GASTROENTEROLOGY | Facility: CLINIC | Age: 47
End: 2022-06-15
Payer: MEDICARE

## 2022-06-15 NOTE — TELEPHONE ENCOUNTER
----- Message from Elvira Barr RRT sent at 6/15/2022  1:54 PM CDT -----  Regarding: earlier f/u - ED visit hematemesis  Patient need follow up after being seen in ED for hematemesis. I know she has a scope scheduled in September. Can she be seen in clinic or have her scope moved up? Please contact patient.        Thanks!    TALITA Leon, RRT  ED Navigator, Case Management  [phone]  [facility]

## 2022-06-16 ENCOUNTER — TELEPHONE (OUTPATIENT)
Dept: GASTROENTEROLOGY | Facility: CLINIC | Age: 47
End: 2022-06-16
Payer: MEDICARE

## 2022-06-16 NOTE — TELEPHONE ENCOUNTER
Attempted to contact x 2. Vmail left asking pt to return call to schedule scope. Phone number provided.

## 2022-06-22 ENCOUNTER — TELEPHONE (OUTPATIENT)
Dept: NEPHROLOGY | Facility: CLINIC | Age: 47
End: 2022-06-22
Payer: MEDICARE

## 2022-07-10 PROBLEM — I50.33 ACUTE ON CHRONIC DIASTOLIC CHF (CONGESTIVE HEART FAILURE): Status: ACTIVE | Noted: 2020-11-12

## 2022-07-10 PROBLEM — R11.10 ABDOMINAL PAIN WITH VOMITING: Status: ACTIVE | Noted: 2018-01-11

## 2022-07-11 PROBLEM — I21.4 NSTEMI (NON-ST ELEVATED MYOCARDIAL INFARCTION): Status: ACTIVE | Noted: 2017-12-10

## 2022-07-14 ENCOUNTER — OFFICE VISIT (OUTPATIENT)
Dept: CARDIOLOGY | Facility: CLINIC | Age: 47
End: 2022-07-14
Payer: MEDICARE

## 2022-07-14 VITALS
SYSTOLIC BLOOD PRESSURE: 186 MMHG | BODY MASS INDEX: 29.79 KG/M2 | HEIGHT: 65 IN | DIASTOLIC BLOOD PRESSURE: 76 MMHG | WEIGHT: 178.81 LBS | HEART RATE: 78 BPM

## 2022-07-14 DIAGNOSIS — N18.6 ESRD NEEDING DIALYSIS: ICD-10-CM

## 2022-07-14 DIAGNOSIS — Z99.2 ESRD NEEDING DIALYSIS: ICD-10-CM

## 2022-07-14 DIAGNOSIS — I48.0 PAF (PAROXYSMAL ATRIAL FIBRILLATION): ICD-10-CM

## 2022-07-14 DIAGNOSIS — Z95.1 S/P CABG X 5: Primary | ICD-10-CM

## 2022-07-14 PROCEDURE — 99999 PR PBB SHADOW E&M-EST. PATIENT-LVL III: CPT | Mod: PBBFAC,,, | Performed by: INTERNAL MEDICINE

## 2022-07-14 PROCEDURE — 99214 PR OFFICE/OUTPT VISIT, EST, LEVL IV, 30-39 MIN: ICD-10-PCS | Mod: S$GLB,,, | Performed by: INTERNAL MEDICINE

## 2022-07-14 PROCEDURE — 99999 PR PBB SHADOW E&M-EST. PATIENT-LVL III: ICD-10-PCS | Mod: PBBFAC,,, | Performed by: INTERNAL MEDICINE

## 2022-07-14 PROCEDURE — 99214 OFFICE O/P EST MOD 30 MIN: CPT | Mod: S$GLB,,, | Performed by: INTERNAL MEDICINE

## 2022-07-14 NOTE — PROGRESS NOTES
Subjective:    Patient ID:  Gilda Schroeder is a 47 y.o. female who presents for follow-up of Coronary Artery Disease      HPI  She comes with no complaints, no chest pain, no shortness of breath  Admitted to the hospital 2 weeks ago with abdominal discomfort.  She ended up having an angiogram showing patent graft to the LAD, OM and PDA  Echocardiogram showed normal LV systolic function with mild mitral regurgitation.  Getting dialysis 3 times a week.  Blood pressure has been in the high side lately    Review of Systems   Constitutional: Negative for decreased appetite, malaise/fatigue, weight gain and weight loss.   Cardiovascular: Negative for chest pain, dyspnea on exertion, leg swelling, palpitations and syncope.   Respiratory: Negative for cough and shortness of breath.    Gastrointestinal: Negative.    Neurological: Negative for weakness.   All other systems reviewed and are negative.       Objective:      Physical Exam  Vitals and nursing note reviewed.   Constitutional:       Appearance: Normal appearance. She is well-developed.   HENT:      Head: Normocephalic.   Eyes:      Pupils: Pupils are equal, round, and reactive to light.   Neck:      Thyroid: No thyromegaly.      Vascular: No carotid bruit or JVD.   Cardiovascular:      Rate and Rhythm: Normal rate and regular rhythm.      Chest Wall: PMI is not displaced.      Pulses: Normal pulses and intact distal pulses.      Heart sounds: Normal heart sounds. No murmur heard.    No gallop.   Pulmonary:      Effort: Pulmonary effort is normal.      Breath sounds: Normal breath sounds.   Abdominal:      Palpations: Abdomen is soft. There is no mass.      Tenderness: There is no abdominal tenderness.   Musculoskeletal:         General: Normal range of motion.      Cervical back: Normal range of motion and neck supple.   Skin:     General: Skin is warm.   Neurological:      Mental Status: She is alert and oriented to person, place, and time.      Sensory:  No sensory deficit.      Deep Tendon Reflexes: Reflexes are normal and symmetric.           Assessment:       1. S/P CABG x 5    2. PAF (paroxysmal atrial fibrillation)    3. ESRD needing dialysis         Plan:     Continue all cardiac medications  Regular exercise program  Weight loss  Three-month follow-up

## 2022-07-21 PROBLEM — T68.XXXA HYPOTHERMIA: Status: RESOLVED | Noted: 2020-07-01 | Resolved: 2022-07-21

## 2022-07-21 PROBLEM — E87.6 HYPOKALEMIA: Status: RESOLVED | Noted: 2021-09-24 | Resolved: 2022-07-21

## 2022-07-21 PROBLEM — Z78.9 POOR HISTORIAN: Status: RESOLVED | Noted: 2019-12-03 | Resolved: 2022-07-21

## 2022-07-21 PROBLEM — E11.65 TYPE 2 DIABETES MELLITUS WITH HYPERGLYCEMIA: Status: RESOLVED | Noted: 2019-12-03 | Resolved: 2022-07-21

## 2022-07-21 PROBLEM — E87.20 LACTIC ACIDOSIS: Status: RESOLVED | Noted: 2018-10-15 | Resolved: 2022-07-21

## 2022-07-21 PROBLEM — E44.1 MILD PROTEIN-CALORIE MALNUTRITION: Status: RESOLVED | Noted: 2018-01-17 | Resolved: 2022-07-21

## 2022-07-21 PROBLEM — E11.10 DKA (DIABETIC KETOACIDOSIS): Status: ACTIVE | Noted: 2022-07-21

## 2022-07-21 PROBLEM — D50.9 IRON DEFICIENCY ANEMIA, UNSPECIFIED: Status: RESOLVED | Noted: 2018-01-17 | Resolved: 2022-07-21

## 2022-07-21 PROBLEM — Z71.89 ACP (ADVANCE CARE PLANNING): Status: ACTIVE | Noted: 2022-07-21

## 2022-07-21 PROBLEM — I25.5 ISCHEMIC CARDIOMYOPATHY: Status: RESOLVED | Noted: 2019-10-17 | Resolved: 2022-07-21

## 2022-07-21 PROBLEM — R09.02 HYPOXIA: Status: ACTIVE | Noted: 2022-07-21

## 2022-07-21 PROBLEM — I16.0 HYPERTENSIVE URGENCY: Status: ACTIVE | Noted: 2019-12-03

## 2022-07-21 PROBLEM — E16.2 HYPOGLYCEMIA: Status: RESOLVED | Noted: 2020-06-30 | Resolved: 2022-07-21

## 2022-07-22 PROBLEM — F32.9 REACTIVE DEPRESSION: Status: ACTIVE | Noted: 2022-07-22

## 2022-08-05 PROBLEM — F32.A DEPRESSION: Status: ACTIVE | Noted: 2022-01-01

## 2022-08-05 PROBLEM — I50.32 CHRONIC DIASTOLIC HEART FAILURE: Status: ACTIVE | Noted: 2022-01-01

## 2022-08-25 NOTE — TELEPHONE ENCOUNTER
----- Message from Jay Jones sent at 8/25/2022  9:44 AM CDT -----  Type: Needs Medical Advice  Who Called:  Patient    Best Call Back Number: [phone]  Additional Information:Patient states that she would like a callback regarding her colonoscopy prep.

## 2022-08-26 NOTE — TELEPHONE ENCOUNTER
----- Message from Thomas England sent at 8/26/2022  9:31 AM CDT -----  Contact: pt at [phone]  Type:  Patient Returning Call    Who Called:  pt  Who Left Message for Patient:  Kaitlynn  Does the patient know what this is regarding?:  yes  Best Call Back Number:  [phone]  Additional Information:  pt is calling the office back to return a call back to Kaitlynn. Please call back and advise.

## 2022-08-29 NOTE — TELEPHONE ENCOUNTER
Primitivo afternoon,     Ms. Schroeder states she needs clarification regarding prep instructions for her colonoscopy scheduled on Thursday, 9/1. She does not have access to her Aptela and does not have a working e-mail account.     She also takes daily aspirin and eliquis. She is not sure whether she needs to hold these prior to her procedure.     Please contact her to clarify at your earliest convenience.     Thank you!

## 2022-08-30 NOTE — TELEPHONE ENCOUNTER
Spoke with pt. Pt did receive email with new prep instructions. Informed to hold eliquis today & tomorrow for procedure on Thursday. All questions answered to pt's satisfaction. Pt verbalized understanding.

## 2022-09-01 NOTE — H&P
History & Physical - Short Stay  Gastroenterology      SUBJECTIVE:     Procedure: Colonoscopy    Chief Complaint/Indication for Procedure: Screening    PTA Medications   Medication Sig    acetaminophen (TYLENOL) 500 MG tablet Take 500 mg by mouth every 6 (six) hours as needed for Pain (headache).    apixaban (ELIQUIS) 5 mg Tab Take 1 tablet (5 mg total) by mouth 2 (two) times daily.    aspirin (ECOTRIN) 81 MG EC tablet Take 81 mg by mouth every morning.    atorvastatin calcium (ATORVASTATIN ORAL) Take by mouth nightly.    calcitRIOL (ROCALTROL) 0.5 MCG Cap Take 0.5 mcg by mouth every morning.    cinacalcet (SENSIPAR) 60 MG Tab Take 60 mg by mouth daily with breakfast.    cloNIDine 0.3 mg/24 hr td ptwk (CATAPRES) 0.3 mg/24 hr Place 1 patch onto the skin every 7 days.    ergocalciferol (ERGOCALCIFEROL) 50,000 unit Cap Take 50,000 Units by mouth every 30 days.    EScitalopram oxalate (LEXAPRO) 20 MG tablet Take 20 mg by mouth every morning.    hydrALAZINE (APRESOLINE) 100 MG tablet Take 1 tablet (100 mg total) by mouth 3 (three) times daily.    insulin aspart U-100 (NOVOLOG) 100 unit/mL injection Inject 2-10 Units into the skin before meals as needed for High Blood Sugar (via sliding scale). Sliding scale:    150-200 = 2 units  201-250 = 4 units  251-300 = 6 units  301-350 = 8 units   351-400 = 10 units  >400 call MD    insulin detemir U-100 (LEVEMIR) 100 unit/mL injection Inject 15 Units into the skin every evening.    losartan (COZAAR) 100 MG tablet Take 1 tablet (100 mg total) by mouth once daily. (Patient taking differently: Take 100 mg by mouth every morning.)    metoclopramide HCl (REGLAN) 10 MG tablet Take 1 tablet (10 mg total) by mouth 3 (three) times daily before meals.    metoprolol succinate (TOPROL-XL) 50 MG 24 hr tablet Take 1 tablet (50 mg total) by mouth every morning.    pantoprazole (PROTONIX) 40 MG tablet Take 40 mg by mouth every morning.    sodium bicarbonate 650 MG tablet Take 1 tablet (650 mg  total) by mouth 3 (three) times daily.    vit B comp no.3/folic/C/biotin (MELVIN-DOTTY RX ORAL) Take 1 capsule by mouth every morning.    amlodipine besylate (AMLODIPINE ORAL) Take by mouth every evening.    blood sugar diagnostic Strp To check BG 3-4 times daily, to use with insurance preferred meter  E 11.65    blood-glucose meter kit To check BG 3 to 4 times daily, to use with insurance preferred meter  DX E 11.65    buPROPion (WELLBUTRIN SR) 100 MG TBSR 12 hr tablet Take 1 tablet (100 mg total) by mouth 2 (two) times daily.    ferric citrate (AURYXIA) 210 mg iron Tab Take 210 mg by mouth 3 (three) times daily.    lancets Misc To check BG 3-4 times daily, to use with insurance preferred meter  E 11.65    sevelamer carbonate (RENVELA) 800 mg Tab Take 1 tablet (800 mg total) by mouth 3 (three) times daily with meals.       Review of patient's allergies indicates:  No Known Allergies     Past Medical History:   Diagnosis Date    Allergy     morphine    Anemia     Anticoagulant long-term use     Atrial fibrillation     CAD (coronary artery disease)     stent x 2    Chronic diastolic CHF (congestive heart failure)     Coronary artery disease involving native coronary artery of native heart with unstable angina pectoris 11/16/2020    Diabetes mellitus     type 2    Diabetes mellitus with ESRD (end-stage renal disease) 07/05/2016    Disorder of kidney and ureter     ESRD (end stage renal disease) on dialysis     ESRD on hemodialysis     T/T/S    Gastritis     Gastroesophageal reflux disease without esophagitis 01/10/2018    Gastroparesis     History of heart artery stent 11/16/2020    HTN (hypertension)     Hypothyroidism, unspecified 10/28/2019    Obesity     Peritonitis associated with peritoneal dialysis 08/2017    Reactive depression 7/22/2022    S/P CABG x 5 11/16/2020    Tricuspid regurgitation      Past Surgical History:   Procedure Laterality Date    ANGIOGRAM, CORONARY, WITH LEFT HEART CATHETERIZATION N/A  2022    Procedure: Angiogram, Coronary, with Left Heart Cath;  Surgeon: Magui Bush MD;  Location: [facility] CATH;  Service: Cardiology;  Laterality: N/A;    AV FISTULA PLACEMENT      CARDIOVERSION       SECTION      CORONARY ANGIOGRAPHY N/A 2019    Procedure: ANGIOGRAM, CORONARY ARTERY;  Surgeon: Lucho Crowder MD;  Location: [facility] CATH;  Service: Cardiology;  Laterality: N/A;    CORONARY ANGIOGRAPHY N/A 2019    Procedure: ANGIOGRAM, CORONARY ARTERY;  Surgeon: Toñito Vincent MD;  Location: [facility] CATH;  Service: Cardiology;  Laterality: N/A;    CORONARY ANGIOGRAPHY N/A 2020    Procedure: ANGIOGRAM, CORONARY ARTERY;  Surgeon: Toñito Vincent MD;  [facility]: [facility]ATH;  Service: Cardiology;  Laterality: N/A;    CORONARY ANGIOGRAPHY INCLUDING BYPASS GRAFTS WITH CATHETERIZATION OF LEFT HEART N/A 2022    Procedure: ANGIOGRAM, CORONARY, INCLUDING BYPASS GRAFT, WITH LEFT HEART CATHETERIZATION;  Surgeon: Magui Bush MD;  Location: [facility] CATH;  Service: Cardiology;  Laterality: N/A;    CORONARY ARTERY BYPASS GRAFT (CABG) N/A 2020    Procedure: CORONARY ARTERY BYPASS GRAFT (CABG) X 5 VESSELS;  Surgeon: Jasson Mcallister MD;  Location: [facility] OR;  Service: Cardiovascular;  Laterality: N/A;    ENDOSCOPIC HARVEST OF VEIN Left 2020    Procedure: SURGICAL PROCUREMENT, VEIN, ENDOSCOPIC;  Surgeon: Jasson Mcallister MD;  Location: [facility] OR;  Service: Cardiovascular;  Laterality: Left;    ESOPHAGOGASTRODUODENOSCOPY N/A 2019    Procedure: EGD (ESOPHAGOGASTRODUODENOSCOPY);  Surgeon: Perry Washburn MD;  Location: [facility] ENDO;  Service: Endoscopy;  Laterality: N/A;    heart stent  2017    LEFT HEART CATHETERIZATION Right 2019    Procedure: Left heart cath;  Surgeon: Lucho Crowder MD;  Location: [facility] CATH;  Service: Cardiology;  Laterality: Right;    LEFT HEART CATHETERIZATION Left 06/10/2019    Procedure: Left heart cath, 330 pm start;  Surgeon: Qasim Guzman,  MD;  Location: [facility] CATH LAB;  Service: Cardiology;  Laterality: Left;    LEFT HEART CATHETERIZATION Left 2019    Procedure: Left heart cath- RM # [id number];  Surgeon: Lucho Crowder MD;  [facility];  Service: Cardiology;  Laterality: Left;    LEFT HEART CATHETERIZATION Left 2020    Procedure: CATHETERIZATION, HEART, LEFT;  Surgeon: Toñito Vincent MD;  Location: [facility] CATH;  Service: Cardiology;  Laterality: Left;    LEFT HEART CATHETERIZATION Right 2020    Procedure: Left heart cath;  Surgeon: Toñito Vincent MD;  Location: [facility] CATH;  Service: Cardiology;  Laterality: Right;    PERCUTANEOUS TRANSLUMINAL BALLOON ANGIOPLASTY OF CORONARY ARTERY  06/10/2019    Procedure: Angioplasty-coronary;  Surgeon: Qasim Guzman MD;  Location: [facility] CATH LAB;  Service: Cardiology;;    PERCUTANEOUS TRANSLUMINAL BALLOON ANGIOPLASTY OF CORONARY ARTERY  2019    Procedure: Angioplasty-coronary;  Surgeon: Toñito Vincent MD;  [facility];  Service: Cardiology;;    PLACEMENT PERITONEAL DIALYSIS CATHETER      PORTACATH PLACEMENT      for dialysis; removed    SKIN GRAFT  10/2017    TUBAL LIGATION       Family History   Problem Relation Age of Onset    Diabetes Mother     No Known Problems Father     Breast cancer Sister 38    No Known Problems Brother      Social History     Tobacco Use    Smoking status: Former     Packs/day: 0.50     Years: 12.00     Pack years: 6.00     Types: Cigarettes     Quit date:      Years since quittin.6    Smokeless tobacco: Never   Substance Use Topics    Alcohol use: Not Currently    Drug use: No         OBJECTIVE:     Vital Signs (Most Recent)  Temp: 97.9 °F (36.6 °C) (22)  Pulse: 62 (22)  Resp: 16 (22)  BP: (!) 199/89 (22)  SpO2: 98 % (22)    Physical Exam                                                        GENERAL:  Comfortable, in no acute distress.                                 HEENT EXAM:   Nonicteric.  No adenopathy.  Oropharynx is clear.               NECK:  Supple.                                                               LUNGS:  Clear.                                                               CARDIAC:  Regular rate and rhythm.  S1, S2.  No murmur.                      ABDOMEN:  Soft, positive bowel sounds, nontender.  No hepatosplenomegaly or masses.  No rebound or guarding.                                             EXTREMITIES:  No edema.     MENTAL STATUS:  Normal, alert and oriented.      ASSESSMENT/PLAN:     Assessment: Colorectal cancer screening    Plan: Colonoscopy    Anesthesia Plan: General    ASA Grade: ASA 3 - Patient with moderate systemic disease with functional limitations    MALLAMPATI SCORE:  I (soft palate, uvula, fauces, and tonsillar pillars visible)

## 2022-09-01 NOTE — ANESTHESIA PREPROCEDURE EVALUATION
2022  Gilda Schroeder is a 47 y.o., female     Planned Procedure:  Procedure(s) (LRB):  COLONOSCOPY (N/A)    Diagnosis:  No diagnosis found.    Last Menstrual Period 2020       Past Medical History:   Diagnosis Date    Allergy     morphine    Anemia     Anticoagulant long-term use     Atrial fibrillation     CAD (coronary artery disease)     stent x 2    Chronic diastolic CHF (congestive heart failure)     Coronary artery disease involving native coronary artery of native heart with unstable angina pectoris 2020    Diabetes mellitus     type 2    Diabetes mellitus with ESRD (end-stage renal disease) 2016    Disorder of kidney and ureter     ESRD (end stage renal disease) on dialysis     ESRD on hemodialysis     T/T/S    Gastritis     Gastroesophageal reflux disease without esophagitis 01/10/2018    Gastroparesis     History of heart artery stent 2020    HTN (hypertension)     Hypothyroidism, unspecified 10/28/2019    Obesity     Peritonitis associated with peritoneal dialysis 2017    Reactive depression 2022    S/P CABG x 5 2020    Tricuspid regurgitation      Past Surgical History:   Procedure Laterality Date    ANGIOGRAM, CORONARY, WITH LEFT HEART CATHETERIZATION N/A 2022    Procedure: Angiogram, Coronary, with Left Heart Cath;  Surgeon: Magui Bush MD;  Location: [facility] CATH;  Service: Cardiology;  Laterality: N/A;    AV FISTULA PLACEMENT      CARDIOVERSION       SECTION      CORONARY ANGIOGRAPHY N/A 2019    Procedure: ANGIOGRAM, CORONARY ARTERY;  Surgeon: Lucho Crowder MD;  Location: [facility] CATH;  Service: Cardiology;  Laterality: N/A;    CORONARY ANGIOGRAPHY N/A 2019    Procedure: ANGIOGRAM, CORONARY ARTERY;  Surgeon: Toñito Vincent MD;  Location: [facility] CATH;  Service: Cardiology;   Laterality: N/A;    CORONARY ANGIOGRAPHY N/A 06/25/2020    Procedure: ANGIOGRAM, CORONARY ARTERY;  Surgeon: Toñito Vincent MD;  Location: [facility] CATH;  Service: Cardiology;  Laterality: N/A;    CORONARY ANGIOGRAPHY INCLUDING BYPASS GRAFTS WITH CATHETERIZATION OF LEFT HEART N/A 7/11/2022    Procedure: ANGIOGRAM, CORONARY, INCLUDING BYPASS GRAFT, WITH LEFT HEART CATHETERIZATION;  Surgeon: Magui Bush MD;  Location: [facility] CATH;  Service: Cardiology;  Laterality: N/A;    CORONARY ARTERY BYPASS GRAFT (CABG) N/A 11/16/2020    Procedure: CORONARY ARTERY BYPASS GRAFT (CABG) X 5 VESSELS;  Surgeon: Jasson Mcallister MD;  Location: [facility] OR;  Service: Cardiovascular;  Laterality: N/A;    ENDOSCOPIC HARVEST OF VEIN Left 11/16/2020    Procedure: SURGICAL PROCUREMENT, VEIN, ENDOSCOPIC;  Surgeon: Jasson Mcallister MD;  Location: [facility] OR;  Service: Cardiovascular;  Laterality: Left;    ESOPHAGOGASTRODUODENOSCOPY N/A 03/14/2019    Procedure: EGD (ESOPHAGOGASTRODUODENOSCOPY);  Surgeon: Perry Washburn MD;  Location: [facility] ENDO;  Service: Endoscopy;  Laterality: N/A;    heart stent  12/2017    LEFT HEART CATHETERIZATION Right 03/16/2019    Procedure: Left heart cath;  Surgeon: Lucho Crowder MD;  Location: [facility] CATH;  Service: Cardiology;  Laterality: Right;    LEFT HEART CATHETERIZATION Left 06/10/2019    Procedure: Left heart cath, 330 pm start;  Surgeon: Qasim Guzman MD;  Location: [facility] CATH LAB;  Service: Cardiology;  Laterality: Left;    LEFT HEART CATHETERIZATION Left 11/08/2019    Procedure: Left heart cath-  # 223 A;  Surgeon: Lucho Crowder MD;  Location: [facility] CATH;  Service: Cardiology;  Laterality: Left;    LEFT HEART CATHETERIZATION Left 06/25/2020    Procedure: CATHETERIZATION, HEART, LEFT;  Surgeon: Toñito Vincent MD;  Location: [facility] CATH;  Service: Cardiology;  Laterality: Left;    LEFT HEART CATHETERIZATION Right 11/11/2020    Procedure: Left heart cath;  Surgeon: Toñito Vincent MD;   Location: [facility] CATH;  Service: Cardiology;  Laterality: Right;    PERCUTANEOUS TRANSLUMINAL BALLOON ANGIOPLASTY OF CORONARY ARTERY  06/10/2019    Procedure: Angioplasty-coronary;  Surgeon: Qasim Guzman MD;  Location: [facility] CATH LAB;  Service: Cardiology;;    PERCUTANEOUS TRANSLUMINAL BALLOON ANGIOPLASTY OF CORONARY ARTERY  11/20/2019    Procedure: Angioplasty-coronary;  Surgeon: Toñito Vincent MD;  Location: [facility] CATH;  Service: Cardiology;;    PLACEMENT PERITONEAL DIALYSIS CATHETER      PORTACATH PLACEMENT      for dialysis; removed    SKIN GRAFT  10/2017    TUBAL LIGATION         Labs:      Recent Labs   Lab 08/06/22  0523   WBC 9.09   HGB 10.1*   HCT 30.3*           Recent Labs   Lab 08/04/22  0527 08/05/22  0531 08/06/22  0733   * 139 136   K 4.7 4.2 4.4   CL 92* 89* 87*   CO2 24 26 22   BUN 29* 18 32*   CREATININE 5.83* 4.56* 6.85*   * 113* 149*   CALCIUM 8.6 8.6 8.7        No results for input(s): PT, INR, PTT in the last 720 hours.     .    Pre-op Assessment    I have reviewed the Patient Summary Reports.    I have reviewed the Nursing Notes. I have reviewed the NPO Status.   I have reviewed the Medications.     Review of Systems  Anesthesia Hx:  No problems with previous Anesthesia    Social:  Former Smoker    Cardiovascular:   Hypertension Past MI CAD   Angina CHF hyperlipidemia · The left ventricle is normal in size with normal systolic function. The estimated ejection fraction is 55%.  · Grade II diastolic dysfunction.  · The estimated PA systolic pressure is 40 mmHg.  · Normal right ventricular size with normal right ventricular systolic function.  · Normal central venous pressure (3 mmHg).  · Mild mitral regurgitation.  · Mild tricuspid regurgitation.  · Mild pulmonic regurgitation.   Pulmonary:   Shortness of breath    Renal/:   Chronic Renal Disease, ESRD, Dialysis    Hepatic/GI:   GERD    Neurological:   Headaches    Endocrine:   Diabetes, type 2, using insulin  Hypothyroidism    Psych:   Psychiatric History          Physical Exam  General:  Well nourished      Airway/Jaw/Neck:  Airway Findings: Mouth Opening: Normal   Tongue: Normal   General Airway Assessment: Adult Oropharynx Findings: Normal Mallampati: III  Improves to II with phonation.  TM Distance: Normal, at least 6 cm   Jaw/Neck Findings:  Neck ROM: Normal ROM   Neck Findings: Normal    Eyes/Ears/Nose:  Eyes/Ears/Nose Findings:    Dental:  Dental Findings: In tact     Chest/Lungs:  Chest/Lungs Findings: Normal Respiratory Rate      Heart/Vascular:  Heart Findings: Rate: Normal  Rhythm: Regular Rhythm  Sounds: Normal  Heart murmur: negative    Abdomen:  Abdomen Findings: Normal    Musculoskeletal:  Musculoskeletal Findings: Normal   Skin:  Skin Findings: Normal    Mental Status:  Mental Status Findings:  Cooperative, Alert and Oriented         Anesthesia Plan  Type of Anesthesia, risks & benefits discussed:  Anesthesia Type:  general    Patient's Preference:   Plan Factors:          Intra-op Monitoring Plan: Standard ASA Monitors  Intra-op Monitoring Plan Comments:   Post Op Pain Control Plan:   Post Op Pain Control Plan Comments:     Induction:   IV  Beta Blocker:  Patient is not currently on a Beta-Blocker (No further documentation required).       Informed Consent: Informed consent signed with the Patient and all parties understand the risks and agree with anesthesia plan.  All questions answered.  Anesthesia consent signed with patient.  ASA Score: 4     Day of Surgery Review of History & Physical: I have interviewed and examined the patient. I have reviewed the patient's H&P dated:        Anesthesia Plan Notes: Patient understands and accepts the risk of dental injury.  \Discussed risk of obstructive sleep apnea with patient.  Advised to follow up with primary care physician.            Ready For Surgery From Anesthesia Perspective.           Physical Exam  General: Well nourished    Airway:  Mallampati: III /  II  Mouth Opening: Normal  TM Distance: Normal, at least 6 cm  Tongue: Normal  Neck ROM: Normal ROM    Dental:  In tact    Chest/Lungs:  Normal Respiratory Rate    Heart:  Rate: Normal  Rhythm: Regular Rhythm  Sounds: Normal          Anesthesia Plan  Type of Anesthesia, risks & benefits discussed:    Anesthesia Type: general  Intra-op Monitoring Plan: Standard ASA Monitors  Induction:  IV  Informed Consent: Informed consent signed with the Patient and all parties understand the risks and agree with anesthesia plan.  All questions answered.   ASA Score: 4  Day of Surgery Review of History & Physical: I have interviewed and examined the patient. I have reviewed the patient's H&P dated:   Anesthesia Plan Notes: Patient understands and accepts the risk of dental injury.  \Discussed risk of obstructive sleep apnea with patient.  Advised to follow up with primary care physician.        Ready For Surgery From Anesthesia Perspective.       .

## 2022-09-01 NOTE — DISCHARGE SUMMARY
[facility] - Endoscopy  Discharge Note  Short Stay  Discharge Note  Short Stay      SUMMARY     Admit Date: 9/1/2022    Attending Physician: Ranulfo Marcano MD     Discharge Physician: Ranulfo Marcano MD    Discharge Date: 9/1/2022 7:26 AM    Final Diagnosis: Screening [Z13.9]    Disposition: HOME OR SELF CARE    Patient Instructions:   Current Discharge Medication List        CONTINUE these medications which have NOT CHANGED    Details   acetaminophen (TYLENOL) 500 MG tablet Take 500 mg by mouth every 6 (six) hours as needed for Pain (headache).      apixaban (ELIQUIS) 5 mg Tab Take 1 tablet (5 mg total) by mouth 2 (two) times daily.  Qty: 60 tablet, Refills: 5    Associated Diagnoses: SOB (shortness of breath); Hypertensive emergency; Pulmonary edema with congestive heart failure; ACP (advance care planning); DKA (diabetic ketoacidosis); ESRD needing dialysis; Hypoxia; Elevated troponin; Bradycardia; Anemia, unspecified type; Diabetes mellitus with ESRD (end-stage renal disease); Shortness of breath; Encounter for immunization; Non-ST elevation (NSTEMI) myocardial infarction; Other obesity due to excess calories; Hyperlipidemia, unspecified hyperlipidemia type; Gastro-esophageal reflux disease without esophagitis; End stage renal disease; Acute kidney injury superimposed on CKD; Chronic atrial fibrillation, unspecified; Hypertensive urgency; Anemia in chronic kidney disease, unspecified CKD stage; Secondary hyperparathyroidism of renal origin; Other specified coagulation defects; Other retention of urine; Hyperphosphatemia; Other disorders of magnesium metabolism; Other disorders of electrolyte and fluid balance, not elsewhere classified; Hypothyroidism, unspecified type; Hypertensive chronic kidney disease with stage 5 chronic kidney disease or end stage renal disease; History of anemia due to CKD; Elevated LFTs; Uncontrolled hypertension; Difficult intravenous access; Dependence on renal dialysis;  Disorder of bone, unspecified; Abnormal EKG; PAF (paroxysmal atrial fibrillation); S/P CABG x 5; Acute on chronic diastolic CHF (congestive heart failure); Stented coronary artery; Contamination of blood culture; ACS (acute coronary syndrome); Non-ST elevation MI (NSTEMI); Uncontrolled type 2 diabetes mellitus with hyperglycemia; Diabetic gastroparesis; ESRD on hemodialysis; Pseudoaneurysm of right femoral artery; Chest pain, unspecified type; Mixed hyperlipidemia; Hyperkalemia; Acute respiratory failure with hypoxia; Gastritis without bleeding, unspecified chronicity, unspecified gastritis type; Coronary artery disease involving native coronary artery of native heart without angina pectoris; Gastroparesis; ESRD (end stage renal disease) on dialysis; Coronary artery disease involving native coronary artery of native heart with unstable angina pectoris; Abdominal pain with vomiting; Gastroesophageal reflux disease without esophagitis; NSTEMI (non-ST elevated myocardial infarction); Patient on waiting list for kidney transplant; ESRD (end stage renal disease); Anemia in chronic kidney disease, on chronic dialysis      aspirin (ECOTRIN) 81 MG EC tablet Take 81 mg by mouth every morning.      atorvastatin calcium (ATORVASTATIN ORAL) Take by mouth nightly.      calcitRIOL (ROCALTROL) 0.5 MCG Cap Take 0.5 mcg by mouth every morning.      cinacalcet (SENSIPAR) 60 MG Tab Take 60 mg by mouth daily with breakfast.      cloNIDine 0.3 mg/24 hr td ptwk (CATAPRES) 0.3 mg/24 hr Place 1 patch onto the skin every 7 days.  Qty: 4 patch, Refills: 1    Comments: .      ergocalciferol (ERGOCALCIFEROL) 50,000 unit Cap Take 50,000 Units by mouth every 30 days.      EScitalopram oxalate (LEXAPRO) 20 MG tablet Take 20 mg by mouth every morning.      hydrALAZINE (APRESOLINE) 100 MG tablet Take 1 tablet (100 mg total) by mouth 3 (three) times daily.  Qty: 90 tablet, Refills: 11    Comments: .      insulin aspart U-100 (NOVOLOG) 100 unit/mL  injection Inject 2-10 Units into the skin before meals as needed for High Blood Sugar (via sliding scale). Sliding scale:    150-200 = 2 units  201-250 = 4 units  251-300 = 6 units  301-350 = 8 units   351-400 = 10 units  >400 call MD      insulin detemir U-100 (LEVEMIR) 100 unit/mL injection Inject 15 Units into the skin every evening.  Refills: 12      losartan (COZAAR) 100 MG tablet Take 1 tablet (100 mg total) by mouth once daily.    Comments: .      metoclopramide HCl (REGLAN) 10 MG tablet Take 1 tablet (10 mg total) by mouth 3 (three) times daily before meals.  Qty: 90 tablet, Refills: 1      metoprolol succinate (TOPROL-XL) 50 MG 24 hr tablet Take 1 tablet (50 mg total) by mouth every morning.  Qty: 30 tablet, Refills: 11    Comments: .      pantoprazole (PROTONIX) 40 MG tablet Take 40 mg by mouth every morning.      sodium bicarbonate 650 MG tablet Take 1 tablet (650 mg total) by mouth 3 (three) times daily.  Qty: 90 tablet, Refills: 0      vit B comp no.3/folic/C/biotin (MELVIN-DOTTY RX ORAL) Take 1 capsule by mouth every morning.      amlodipine besylate (AMLODIPINE ORAL) Take by mouth every evening.      blood sugar diagnostic Strp To check BG 3-4 times daily, to use with insurance preferred meter  E 11.65  Qty: 150 each, Refills: 1      blood-glucose meter kit To check BG 3 to 4 times daily, to use with insurance preferred meter  DX E 11.65  Qty: 1 each, Refills: 1    Comments: Provided pt with a True Metrix meter and testing supplies, if pt's insurance doesn't cover this meter please provide a meter and supplies that their insurance will cover. Thank You.      buPROPion (WELLBUTRIN SR) 100 MG TBSR 12 hr tablet Take 1 tablet (100 mg total) by mouth 2 (two) times daily.  Qty: 60 tablet, Refills: 1      ferric citrate (AURYXIA) 210 mg iron Tab Take 210 mg by mouth 3 (three) times daily.      lancets Misc To check BG 3-4 times daily, to use with insurance preferred meter  E 11.65  Qty: 150 each, Refills: 1       sevelamer carbonate (RENVELA) 800 mg Tab Take 1 tablet (800 mg total) by mouth 3 (three) times daily with meals.  Qty: 90 tablet, Refills: 11             Discharge Procedure Orders (must include Diet, Follow-up, Activity)    Follow Up:  Follow up with PCP as previously scheduled  Resume routine diet.  Activity as tolerated.    No driving day of procedure.

## 2022-09-01 NOTE — PROVATION PATIENT INSTRUCTIONS
Discharge Summary/Instructions after an Endoscopic Procedure  Patient Name: Gilda Schroeder  Patient MRN: [id number]  Patient YOB: 1975 Thursday, September 1, 2022  Ranulfo Marcano MD  Dear patient,  As a result of recent federal legislation (The Federal Cures Act), you may   receive lab or pathology results from your procedure in your [facility]   account before your physician is able to contact you. Your physician or   their representative will relay the results to you with their   recommendations at their soonest availability.  Thank you,  RESTRICTIONS:  During your procedure today, you received medications for sedation.  These   medications may affect your judgment, balance and coordination.  Therefore,   for 24 hours, you have the following restrictions:   - DO NOT drive a car, operate machinery, make legal/financial decisions,   sign important papers or drink alcohol.    ACTIVITY:  Today: no heavy lifting, straining or running due to procedural   sedation/anesthesia.  The following day: return to full activity including work.  DIET:  Eat and drink normally unless instructed otherwise.     TREATMENT FOR COMMON SIDE EFFECTS:  - Mild abdominal pain, nausea, belching, bloating or excessive gas:  rest,   eat lightly and use a heating pad.  - Sore Throat: treat with throat lozenges and/or gargle with warm salt   water.  - Because air was used during the procedure, expelling large amounts of air   from your rectum or belching is normal.  - If a bowel prep was taken, you may not have a bowel movement for 1-3 days.    This is normal.  SYMPTOMS TO WATCH FOR AND REPORT TO YOUR PHYSICIAN:  1. Abdominal pain or bloating, other than gas cramps.  2. Chest pain.  3. Back pain.  4. Signs of infection such as: chills or fever occurring within 24 hours   after the procedure.  5. Rectal bleeding, which would show as bright red, maroon, or black stools.   (A tablespoon of blood from the rectum is not serious,  especially if   hemorrhoids are present.)  6. Vomiting.  7. Weakness or dizziness.  GO DIRECTLY TO THE NEAREST EMERGENCY ROOM IF YOU HAVE ANY OF THE FOLLOWING:      Difficulty breathing              Chills and/or fever over 101 F   Persistent vomiting and/or vomiting blood   Severe abdominal pain   Severe chest pain   Black, tarry stools   Bleeding- more than one tablespoon   Any other symptom or condition that you feel may need urgent attention  Your doctor recommends these additional instructions:  If any biopsies were taken, your doctors clinic will contact you in 1 to 2   weeks with any results.  Your physician has recommended a repeat colonoscopy in 10 years for   screening purposes.   You are being discharged to home.  For questions, problems or results please call your physician - Ranulfo Marcano MD at Work:  [phone].  EMERGENCY PHONE NUMBER: [phone], LAB RESULTS: [phone]  IF A COMPLICATION OR EMERGENCY SITUATION ARISES AND YOU ARE UNABLE TO REACH   YOUR PHYSICIAN - GO DIRECTLY TO THE EMERGENCY ROOM.  ___________________________________________  Nurse Signature  ___________________________________________  Patient/Designated Responsible Party Signature  Ranulfo Marcano MD  9/1/2022 7:25:29 AM  This report has been verified and signed electronically.  Dear patient,  As a result of recent federal legislation (The Federal Cures Act), you may   receive lab or pathology results from your procedure in your [facility]   account before your physician is able to contact you. Your physician or   their representative will relay the results to you with their   recommendations at their soonest availability.  Thank you.  PROVATION

## 2022-09-01 NOTE — TRANSFER OF CARE
"Anesthesia Transfer of Care Note    Patient: Gilda Schroeder    Procedure(s) Performed: Procedure(s) (LRB):  COLONOSCOPY (N/A)    Patient location: PACU    Anesthesia Type: general    Transport from OR: Transported from OR on room air with adequate spontaneous ventilation    Post pain: adequate analgesia    Post assessment: no apparent anesthetic complications and tolerated procedure well    Post vital signs: stable    Level of consciousness: awake and oriented    Nausea/Vomiting: no nausea/vomiting    Complications: none    Transfer of care protocol was followed      Last vitals:   Visit Vitals  BP (!) 199/89 (BP Location: Right arm, Patient Position: Sitting)   Pulse 62   Temp 36.6 °C (97.9 °F) (Skin)   Resp 16   Ht 5' 5" (1.651 m)   Wt 80.7 kg (178 lb)   LMP 11/07/2020   SpO2 98%   Breastfeeding No   BMI 29.62 kg/m²     "

## 2022-09-01 NOTE — ANESTHESIA POSTPROCEDURE EVALUATION
Anesthesia Post Evaluation    Patient: Gilda Schroeder    Procedure(s) Performed: Procedure(s) (LRB):  COLONOSCOPY (N/A)    Final Anesthesia Type: general      Patient location during evaluation: PACU  Patient participation: Yes- Able to Participate  Level of consciousness: awake and alert and oriented  Post-procedure vital signs: reviewed and stable  Pain management: adequate  Airway patency: patent    PONV status at discharge: No PONV  Anesthetic complications: no      Cardiovascular status: blood pressure returned to baseline  Respiratory status: unassisted, spontaneous ventilation and room air  Hydration status: euvolemic  Follow-up not needed.          Vitals Value Taken Time   /72 09/01/22 0754   Temp 36.7 °C (98 °F) 09/01/22 0724   Pulse 61 09/01/22 0754   Resp 18 09/01/22 0754   SpO2 98 % 09/01/22 0754         Event Time   Out of Recovery 07:58:00         Pain/Dasha Score: Dasha Score: 10 (9/1/2022  7:43 AM)

## 2022-09-01 NOTE — PLAN OF CARE
BP elevated. Dr. Nelson notified. Pt on dialysis with fistula to Left upper arm. Pt does not produce urine. Hx of tubal with no menstrual bleeding in over 1 year. UPT waived by Dr. Nelson.

## 2022-09-07 PROBLEM — F32.0 MILD MAJOR DEPRESSION: Status: ACTIVE | Noted: 2022-01-01

## 2022-11-04 PROBLEM — E87.20 METABOLIC ACIDOSIS: Status: ACTIVE | Noted: 2022-01-01

## 2022-11-04 PROBLEM — E83.52 HYPERCALCEMIA: Status: ACTIVE | Noted: 2022-01-01

## 2022-11-06 PROBLEM — K76.1 CHRONIC PASSIVE CONGESTION OF LIVER: Status: ACTIVE | Noted: 2022-01-01

## 2022-11-06 PROBLEM — E80.6 HYPERBILIRUBINEMIA: Status: ACTIVE | Noted: 2022-01-01

## 2022-11-06 PROBLEM — Z86.39 HISTORY OF DIABETIC GASTROPARESIS: Status: ACTIVE | Noted: 2022-01-01

## 2022-11-06 PROBLEM — I07.1 TRICUSPID VALVE INSUFFICIENCY: Status: ACTIVE | Noted: 2022-01-01

## 2022-11-07 PROBLEM — E83.52 HYPERCALCEMIA: Status: RESOLVED | Noted: 2022-01-01 | Resolved: 2022-01-01

## 2022-11-08 PROBLEM — E87.20 LACTIC ACIDOSIS: Status: RESOLVED | Noted: 2018-10-15 | Resolved: 2022-01-01

## 2022-11-09 PROBLEM — T82.9XXA COMPLICATION OF AV DIALYSIS FISTULA: Status: ACTIVE | Noted: 2022-01-01

## 2022-11-09 NOTE — Clinical Note
The left brachial was prepped. The site was prepped with ChloraPrep. The patient was draped. The patient was positioned supine.

## 2022-11-09 NOTE — Clinical Note
5 ml of contrast were injected throughout the case. 0 mL of contrast was the total wasted during the case. 5 mL was the total amount used during the case.

## 2022-11-09 NOTE — Clinical Note
A percutaneous stick to the left brachial vein was performed. Ultrasound guidance was used to obtain access.

## 2022-11-10 NOTE — SUBJECTIVE & OBJECTIVE
Interval History: Patient had fistulogram today - will have HD in AM. BP is stable. Will restart liquid diet and advance as tolerated. Will need PO protonix 40mg BID X 2 months and PO sucralfate QID X 2 weeks. Discussed with GI (Avinash) who recommends hold reglan for now as EDG showed no food in stomach. If tolerates diet, will likely dc tomorrow.    Review of Systems   Constitutional:  Negative for activity change, chills, fatigue and fever.   HENT:  Negative for congestion, rhinorrhea and sore throat.    Eyes:  Negative for visual disturbance.   Respiratory:  Negative for shortness of breath.    Cardiovascular:  Negative for chest pain, palpitations and leg swelling.   Gastrointestinal:  Negative for abdominal distention, abdominal pain, constipation, diarrhea, nausea and vomiting.   Genitourinary:         Patient does not make urine   Musculoskeletal:  Negative for arthralgias, back pain, gait problem and myalgias.   Skin:  Negative for color change and wound.   Allergic/Immunologic: Negative.    Neurological:  Negative for dizziness, seizures, speech difficulty and headaches.   Hematological: Negative.    Psychiatric/Behavioral:  Negative for agitation, behavioral problems and confusion. The patient is not nervous/anxious.    Objective:     Vital Signs (Most Recent):  Temp: 97.3 °F (36.3 °C) (11/10/22 1530)  Pulse: 62 (11/10/22 1600)  Resp: 12 (11/10/22 1600)  BP: (!) 103/53 (11/10/22 1600)  SpO2: 99 % (11/10/22 1600)   Vital Signs (24h Range):  Temp:  [97.3 °F (36.3 °C)-98.2 °F (36.8 °C)] 97.3 °F (36.3 °C)  Pulse:  [62-75] 62  Resp:  [12-19] 12  SpO2:  [96 %-99 %] 99 %  BP: ()/(47-85) 103/53     Weight: 77.6 kg (171 lb 1.2 oz)  Body mass index is 28.47 kg/m².    Intake/Output Summary (Last 24 hours) at 11/10/2022 1619  Last data filed at 11/10/2022 0900  Gross per 24 hour   Intake 0 ml   Output --   Net 0 ml      Physical Exam  Vitals and nursing note reviewed.   Constitutional:       General: She is not  in acute distress.     Appearance: She is well-developed. She is not diaphoretic.   HENT:      Head: Normocephalic and atraumatic.      Nose: Nose normal.   Eyes:      General: No scleral icterus.     Extraocular Movements: Extraocular movements intact.      Pupils: Pupils are equal, round, and reactive to light.   Neck:      Thyroid: No thyromegaly.      Vascular: No JVD.      Trachea: No tracheal deviation.   Cardiovascular:      Rate and Rhythm: Normal rate and regular rhythm.      Heart sounds: Normal heart sounds. No murmur heard.    No friction rub. No gallop.      Comments: LUE AV fistula with + thrill/bruit  Pulmonary:      Effort: Pulmonary effort is normal. No respiratory distress.      Breath sounds: Normal breath sounds. No wheezing or rales.   Chest:      Chest wall: No tenderness.   Abdominal:      General: Bowel sounds are normal. There is no distension.      Palpations: Abdomen is soft. There is no mass.      Tenderness: There is no abdominal tenderness.   Genitourinary:     Comments: deferred  Musculoskeletal:         General: No tenderness or deformity. Normal range of motion.      Cervical back: Normal range of motion and neck supple.   Skin:     General: Skin is warm and dry.      Capillary Refill: Capillary refill takes less than 2 seconds.      Coloration: Skin is not pale.      Findings: No erythema or rash.   Neurological:      Mental Status: She is alert and oriented to person, place, and time. Mental status is at baseline.      Cranial Nerves: No cranial nerve deficit.      Motor: No abnormal muscle tone.      Coordination: Coordination normal.   Psychiatric:         Mood and Affect: Mood normal.         Behavior: Behavior normal.       Significant Labs: All pertinent labs within the past 24 hours have been reviewed.  Recent Lab Results  (Last 5 results in the past 24 hours)        11/10/22  1204   11/10/22  0546   11/10/22  0459   11/09/22  2107   11/09/22  1640        Anion Gap     13            Baso #     0.08           Basophil %     1.5           BUN     34           Calcium     8.0           Chloride     93           CO2     21           Creatinine     5.5           Differential Method     Automated           eGFR     9           Eos #     0.2           Eosinophil %     3.6           Glucose     111           Gran # (ANC)     3.3           Gran %     62.2           Hematocrit     30.4           Hemoglobin     10.3           Immature Grans (Abs)     0.03  Comment: Mild elevation in immature granulocytes is non specific and   can be seen in a variety of conditions including stress response,   acute inflammation, trauma and pregnancy. Correlation with other   laboratory and clinical findings is essential.             Immature Granulocytes     0.6           Lymph #     0.9           Lymph %     17.2           MCH     30.8           MCHC     33.9           MCV     91           Mono #     0.8           Mono %     14.9           MPV     11.5           nRBC     0           Platelet Estimate     Decreased           Platelets     102           POCT Glucose 107   117     202   91       Potassium     4.7           RBC     3.34           RDW     18.1           Sodium     127           WBC     5.30                                  Significant Imaging: I have reviewed all pertinent imaging results/findings within the past 24 hours.

## 2022-11-10 NOTE — ASSESSMENT & PLAN NOTE
Keep npo  Vascular consulted on case  Possible fistulogram   11/10:  --had fistulagram today  --patent - will f/u with Vascular in 2 weeks

## 2022-11-10 NOTE — INTERVAL H&P NOTE
The patient has been examined and the H&P has been reviewed:    I concur with the findings and no changes have occurred since H&P was written.    Anesthesia/Surgery risks, benefits and alternative options discussed and understood by patient/family.          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

## 2022-11-10 NOTE — PROGRESS NOTES
Pharmacist Renal Dose Adjustment Note    Gilda Schroeder is a 47 y.o. female being treated with the medication metoclopramide    Patient Data:    Vital Signs (Most Recent):  Temp: 97.3 °F (36.3 °C) (11/10/22 0436)  Pulse: 70 (11/10/22 0436)  Resp: 18 (11/10/22 0436)  BP: 139/65 (11/10/22 0436)  SpO2: 99 % (11/10/22 0436) Vital Signs (72h Range):  Temp:  [97.3 °F (36.3 °C)-98.6 °F (37 °C)]   Pulse:  [58-90]   Resp:  [8-23]   BP: ()/(31-98)   SpO2:  [91 %-100 %]      Recent Labs   Lab 11/07/22  1500 11/08/22  0447 11/09/22  0520   CREATININE 4.60* 5.41*  5.41* 4.14*     Serum creatinine: 4.14 mg/dL (H) 11/09/22 0520  Estimated creatinine clearance: 17.5 mL/min (A)    Metoclopramide 10 mg TID before meals will be changed to 5 mg q6h for CrCl <60 mL/min.     Pharmacist's Name: Lani Killian  Pharmacist's Extension: [phone]

## 2022-11-10 NOTE — SUBJECTIVE & OBJECTIVE
Past Medical History:   Diagnosis Date    Allergy     morphine    Anemia     Anticoagulant long-term use     Atrial fibrillation     CAD (coronary artery disease)     stent x 2    Chronic diastolic CHF (congestive heart failure)     Coronary artery disease involving native coronary artery of native heart with unstable angina pectoris 2020    Diabetes mellitus     type 2    Diabetes mellitus with ESRD (end-stage renal disease) 2016    Disorder of kidney and ureter     ESRD (end stage renal disease) on dialysis     ESRD on hemodialysis     T/T/S    Gastritis     Gastroesophageal reflux disease without esophagitis 01/10/2018    Gastroparesis     History of heart artery stent 2020    HTN (hypertension)     Hypothyroidism, unspecified 10/28/2019    Obesity     Peritonitis associated with peritoneal dialysis 2017    Reactive depression 2022    S/P CABG x 5 2020    Tricuspid regurgitation        Past Surgical History:   Procedure Laterality Date    ANGIOGRAM, CORONARY, WITH LEFT HEART CATHETERIZATION N/A 2022    Procedure: Angiogram, Coronary, with Left Heart Cath;  Surgeon: Magui Bush MD;  Location: [facility] CATH;  Service: Cardiology;  Laterality: N/A;    AV FISTULA PLACEMENT      CARDIOVERSION       SECTION      COLONOSCOPY N/A 2022    Procedure: COLONOSCOPY;  Surgeon: Ranulfo Marcano MD;  Location: [facility] ENDO;  Service: Endoscopy;  Laterality: N/A;    CORONARY ANGIOGRAPHY N/A 2019    Procedure: ANGIOGRAM, CORONARY ARTERY;  Surgeon: Lucho Crowder MD;  Location: [facility] CATH;  Service: Cardiology;  Laterality: N/A;    CORONARY ANGIOGRAPHY N/A 2019    Procedure: ANGIOGRAM, CORONARY ARTERY;  Surgeon: Toñito Vincent MD;  Location: [facility] CATH;  Service: Cardiology;  Laterality: N/A;    CORONARY ANGIOGRAPHY N/A 2020    Procedure: ANGIOGRAM, CORONARY ARTERY;  Surgeon: Toñito Vincent MD;  Location: [facility] CATH;  Service: Cardiology;  Laterality: N/A;     CORONARY ANGIOGRAPHY INCLUDING BYPASS GRAFTS WITH CATHETERIZATION OF LEFT HEART N/A 7/11/2022    Procedure: ANGIOGRAM, CORONARY, INCLUDING BYPASS GRAFT, WITH LEFT HEART CATHETERIZATION;  Surgeon: Magui Bush MD;  Location: [facility] CATH;  Service: Cardiology;  Laterality: N/A;    CORONARY ARTERY BYPASS GRAFT (CABG) N/A 11/16/2020    Procedure: CORONARY ARTERY BYPASS GRAFT (CABG) X 5 VESSELS;  Surgeon: Jasson Mcallister MD;  Location: [facility] OR;  Service: Cardiovascular;  Laterality: N/A;    ENDOSCOPIC HARVEST OF VEIN Left 11/16/2020    Procedure: SURGICAL PROCUREMENT, VEIN, ENDOSCOPIC;  Surgeon: Jasson Mcallister MD;  Location: [facility] OR;  Service: Cardiovascular;  Laterality: Left;    ESOPHAGOGASTRODUODENOSCOPY N/A 03/14/2019    Procedure: EGD (ESOPHAGOGASTRODUODENOSCOPY);  Surgeon: Perry Washburn MD;  Location: [facility] ENDO;  Service: Endoscopy;  Laterality: N/A;    heart stent  12/2017    LEFT HEART CATHETERIZATION Right 03/16/2019    Procedure: Left heart cath;  Surgeon: Lucho Crowder MD;  Location: [facility] CATH;  Service: Cardiology;  Laterality: Right;    LEFT HEART CATHETERIZATION Left 06/10/2019    Procedure: Left heart cath, 330 pm start;  Surgeon: Qasim Guzman MD;  Location: [facility] CATH LAB;  Service: Cardiology;  Laterality: Left;    LEFT HEART CATHETERIZATION Left 11/08/2019    Procedure: Left heart cath- RM # [phone] [id number];  Surgeon: Lucho Crowder MD;  Location: [facility] CATH;  Service: Cardiology;  Laterality: Left;    LEFT HEART CATHETERIZATION Left 06/25/2020    Procedure: CATHETERIZATION, HEART, LEFT;  Surgeon: Toñito Vincent MD;  Location: [facility] CATH;  Service: Cardiology;  Laterality: Left;    LEFT HEART CATHETERIZATION Right 11/11/2020    Procedure: Left heart cath;  Surgeon: Toñito Vincent MD;  Location: [facility] CATH;  Service: Cardiology;  Laterality: Right;    PERCUTANEOUS TRANSLUMINAL BALLOON ANGIOPLASTY OF CORONARY ARTERY  06/10/2019    Procedure: Angioplasty-coronary;  Surgeon: Qasim NAVARRO  MD Megan;  Location: [facility] CATH LAB;  Service: Cardiology;;    PERCUTANEOUS TRANSLUMINAL BALLOON ANGIOPLASTY OF CORONARY ARTERY  11/20/2019    Procedure: Angioplasty-coronary;  Surgeon: Toñito Vincent MD;  Location: [facility] CATH;  Service: Cardiology;;    PLACEMENT PERITONEAL DIALYSIS CATHETER      PORTACATH PLACEMENT      for dialysis; removed    SKIN GRAFT  10/2017    TUBAL LIGATION         Review of patient's allergies indicates:  No Known Allergies    Current Facility-Administered Medications on File Prior to Encounter   Medication    [COMPLETED] LIDOcaine (PF) 10 mg/ml (1%) 10 mg/mL (1 %) injection    [COMPLETED] mupirocin 2 % ointment    [DISCONTINUED] 0.9%  NaCl infusion    [DISCONTINUED] 0.9%  NaCl infusion    [DISCONTINUED] 0.9%  NaCl infusion    [DISCONTINUED] 0.9%  NaCl infusion    [DISCONTINUED] 0.9%  NaCl infusion    [DISCONTINUED] 0.9%  NaCl infusion    [DISCONTINUED] acetaminophen tablet 650 mg    [DISCONTINUED] aspirin EC tablet 81 mg    [DISCONTINUED] atorvastatin tablet 80 mg    [DISCONTINUED] buPROPion TBSR 12 hr tablet 100 mg    [DISCONTINUED] carvediloL tablet 6.25 mg    [DISCONTINUED] cinacalcet tablet 60 mg    [DISCONTINUED] cloNIDine 0.3 mg/24 hr td ptwk 1 patch    [DISCONTINUED] dextrose 10 % infusion    [DISCONTINUED] dextrose 10 % infusion    [DISCONTINUED] dextrose 50% injection 12.5 g    [DISCONTINUED] dextrose 50% injection 12.5 g    [DISCONTINUED] dextrose 50% injection 25 g    [DISCONTINUED] enoxaparin injection 40 mg    [DISCONTINUED] EScitalopram oxalate tablet 20 mg    [DISCONTINUED] ferrous sulfate tablet 1 each    [DISCONTINUED] glucagon (human recombinant) injection 1 mg    [DISCONTINUED] hydrALAZINE injection 10 mg    [DISCONTINUED] influenza (QUADRIVALENT PF) vaccine 0.5 mL    [DISCONTINUED] insulin aspart U-100 pen 1-10 Units    [DISCONTINUED] insulin aspart U-100 pen 4 Units    [DISCONTINUED] insulin detemir U-100 pen 4 Units    [DISCONTINUED] labetalol 20 mg/4 mL (5 mg/mL) IV  syring    [DISCONTINUED] losartan tablet 100 mg    [DISCONTINUED] metoclopramide HCl injection 10 mg    [DISCONTINUED] ondansetron injection 4 mg    [DISCONTINUED] pantoprazole injection 40 mg    [DISCONTINUED] piperacillin-tazobactam 4.5 g in dextrose 5 % 100 mL IVPB (ready to mix system)    [DISCONTINUED] prochlorperazine injection Soln 10 mg    [DISCONTINUED] propofol (DIPRIVAN) 10 mg/mL infusion    [DISCONTINUED] senna-docusate 8.6-50 mg per tablet 1 tablet    [DISCONTINUED] sodium chloride 0.9% bolus 250 mL    [DISCONTINUED] sodium chloride 0.9% bolus 250 mL    [DISCONTINUED] sodium chloride 0.9% bolus 250 mL    [DISCONTINUED] sodium chloride 0.9% flush 10 mL    [DISCONTINUED] sodium chloride 0.9% infusion    [DISCONTINUED] thiamine (B-1) 250 mg in dextrose 5 % 100 mL IVPB     Current Outpatient Medications on File Prior to Encounter   Medication Sig    acetaminophen (TYLENOL) 500 MG tablet Take 500 mg by mouth every 6 (six) hours as needed for Pain (headache).    aspirin (ECOTRIN) 81 MG EC tablet Take 81 mg by mouth every morning.    atorvastatin (LIPITOR) 80 MG tablet Take 1 tablet (80 mg total) by mouth every evening.    blood sugar diagnostic Strp To check BG 3-4 times daily, to use with insurance preferred meter  E 11.65    blood-glucose meter kit To check BG 3 to 4 times daily, to use with insurance preferred meter  DX E 11.65    buPROPion (WELLBUTRIN SR) 100 MG TBSR 12 hr tablet Take 1 tablet (100 mg total) by mouth 2 (two) times daily.    carvediloL (COREG) 6.25 MG tablet Take 1 tablet (6.25 mg total) by mouth 2 (two) times daily.    cinacalcet (SENSIPAR) 60 MG Tab Take 60 mg by mouth daily with breakfast.    cloNIDine 0.3 mg/24 hr td ptwk (CATAPRES) 0.3 mg/24 hr Place 1 patch onto the skin every 7 days. (Patient taking differently: Place 1 patch onto the skin every Sunday.)    EScitalopram oxalate (LEXAPRO) 20 MG tablet Take 20 mg by mouth every morning.    ferrous sulfate 325 (65 FE) MG EC tablet Take  1 tablet (325 mg total) by mouth 2 (two) times daily.    glucagon (GVOKE HYPOPEN 2-PACK) 1 mg/0.2 mL AtIn Inject 1 mg into the skin as needed (as needed for emergent hypoglycemia (low bloodsugar)). Diagnosis Code: E11.65    hydrALAZINE (APRESOLINE) 20 mg/mL injection Inject 0.5 mLs (10 mg total) into the vein every 8 (eight) hours as needed (for SBP greater than 170).    insulin detemir U-100 (LEVEMIR FLEXTOUCH) 100 unit/mL (3 mL) SubQ InPn pen Inject 4 Units into the skin every evening.    lancets Misc To check BG 3-4 times daily, to use with insurance preferred meter  E 11.65    losartan (COZAAR) 100 MG tablet Take 1 tablet (100 mg total) by mouth once daily. (Patient taking differently: Take 100 mg by mouth every morning.)    metoclopramide HCl (REGLAN) 5 mg/mL injection Inject 2 mLs (10 mg total) into the vein every 8 (eight) hours.    pantoprazole (PROTONIX) 40 mg injection Inject 40 mg into the vein once daily.    piperacillin sodium/tazobactam (PIPERACILLIN-TAZOBACTAM 4.5G/100ML D5W IVPB, READY TO MIX,) Inject 100 mLs (4.5 g total) into the vein every 12 (twelve) hours.    [DISCONTINUED] insulin aspart U-100 (NOVOLOG) 100 unit/mL (3 mL) InPn pen Blood Glucose  (mg/dL)    Pre-meal   Bedtime  151-200       2 units       1 unit  201-250       4 units       2 units  251-300       6 units       3 units  301-350       8 units       4 units  >350          10 units       5 units     Family History       Problem Relation (Age of Onset)    Breast cancer Sister (38)    Diabetes Mother    No Known Problems Father, Brother          Tobacco Use    Smoking status: Former     Packs/day: 0.50     Years: 12.00     Pack years: 6.00     Types: Cigarettes     Quit date:      Years since quittin.8    Smokeless tobacco: Never   Substance and Sexual Activity    Alcohol use: Not Currently    Drug use: No    Sexual activity: Yes     Partners: Male     Review of Systems   Constitutional:  Negative for fatigue and fever.   HENT:   Negative for sinus pressure.    Eyes:  Negative for visual disturbance.   Respiratory:  Negative for shortness of breath.    Cardiovascular:  Negative for chest pain.   Gastrointestinal:  Negative for nausea and vomiting.   Genitourinary:  Negative for difficulty urinating.   Musculoskeletal:  Negative for back pain.   Skin:  Negative for rash.   Neurological:  Negative for headaches.   Psychiatric/Behavioral:  Negative for confusion.    Objective:     Vital Signs (Most Recent):  Temp: 97.3 °F (36.3 °C) (11/10/22 0436)  Pulse: 70 (11/10/22 0436)  Resp: 18 (11/10/22 0436)  BP: 139/65 (11/10/22 0436)  SpO2: 99 % (11/10/22 0436) Vital Signs (24h Range):  Temp:  [97.3 °F (36.3 °C)-98.2 °F (36.8 °C)] 97.3 °F (36.3 °C)  Pulse:  [58-75] 70  Resp:  [9-19] 18  SpO2:  [94 %-99 %] 99 %  BP: ()/(31-85) 139/65        There is no height or weight on file to calculate BMI.    Physical Exam  Constitutional:       General: She is not in acute distress.     Appearance: She is well-developed. She is not diaphoretic.   HENT:      Head: Normocephalic and atraumatic.   Eyes:      Pupils: Pupils are equal, round, and reactive to light.   Cardiovascular:      Rate and Rhythm: Normal rate and regular rhythm.      Heart sounds: Normal heart sounds. No murmur heard.    No friction rub. No gallop.      Comments: AV fistula left arm, thrill palpable  Pulmonary:      Effort: Pulmonary effort is normal. No respiratory distress.      Breath sounds: Normal breath sounds. No stridor. No wheezing or rales.   Abdominal:      General: Bowel sounds are normal. There is no distension.      Palpations: Abdomen is soft. There is no mass.      Tenderness: There is no abdominal tenderness. There is no guarding.   Skin:     General: Skin is warm.      Findings: No erythema.   Neurological:      Mental Status: She is alert and oriented to person, place, and time.         CRANIAL NERVES     CN III, IV, VI   Pupils are equal, round, and reactive to  light.     Significant Labs:   Results for orders placed or performed during the hospital encounter of 11/04/22   Blood culture    Specimen: Blood   Result Value Ref Range    Blood Culture, Routine No growth after 5 days.    Blood culture    Specimen: Antecubital, Right; Blood   Result Value Ref Range    Blood Culture, Routine No growth after 5 days.    CBC auto differential   Result Value Ref Range    WBC 10.46 3.90 - 12.70 K/uL    RBC 4.87 4.00 - 5.40 M/uL    Hemoglobin 15.1 12.0 - 16.0 g/dL    Hematocrit 44.7 37.0 - 48.5 %    MCV 92 82 - 98 fL    MCH 31.0 27.0 - 31.0 pg    MCHC 33.8 32.0 - 36.0 g/dL    RDW 18.0 (H) 11.5 - 14.5 %    Platelets 189 150 - 450 K/uL    MPV 12.8 9.2 - 12.9 fL    Immature Granulocytes 0.7 (H) 0.0 - 0.5 %    Gran # (ANC) 7.7 1.8 - 7.7 K/uL    Immature Grans (Abs) 0.07 (H) 0.00 - 0.04 K/uL    Lymph # 1.6 1.0 - 4.8 K/uL    Mono # 1.0 0.3 - 1.0 K/uL    Eos # 0.0 0.0 - 0.5 K/uL    Baso # 0.08 0.00 - 0.20 K/uL    nRBC 0 0 /100 WBC    Gran % 73.2 (H) 38.0 - 73.0 %    Lymph % 15.6 (L) 18.0 - 48.0 %    Mono % 9.6 4.0 - 15.0 %    Eosinophil % 0.1 0.0 - 8.0 %    Basophil % 0.8 0.0 - 1.9 %    Differential Method Automated    Comprehensive metabolic panel   Result Value Ref Range    Sodium 147 (H) 136 - 145 mmol/L    Potassium 5.0 3.5 - 5.1 mmol/L    Chloride 91 (L) 95 - 110 mmol/L    CO2 11 (L) 22 - 31 mmol/L    Glucose 187 (H) 70 - 110 mg/dL    BUN 21 (H) 7 - 18 mg/dL    Creatinine 6.16 (H) 0.50 - 1.40 mg/dL    Calcium 11.7 (H) 8.4 - 10.2 mg/dL    Total Protein 9.9 (H) 6.0 - 8.4 g/dL    Albumin 5.5 (H) 3.5 - 5.2 g/dL    Total Bilirubin 2.9 (H) 0.2 - 1.3 mg/dL    Alkaline Phosphatase 217 (H) 38 - 145 U/L    AST 63 (H) 14 - 36 U/L    ALT 47 (H) 0 - 35 U/L    Anion Gap 45 (H) 8 - 16 mmol/L    eGFR 8 (A) >60 mL/min/1.73 m^2   Lipase   Result Value Ref Range    Lipase Result 118 23 - 300 U/L   Magnesium   Result Value Ref Range    Magnesium 2.2 1.6 - 2.6 mg/dL   Lactic acid, plasma   Result Value Ref  Range    Lactate (Lactic Acid) 20.6 (HH) 0.5 - 2.2 mmol/L   COVID-19 Rapid Screening   Result Value Ref Range    SARS-CoV-2 RNA, Amplification, Qual Negative Negative   Lactic acid, plasma   Result Value Ref Range    Lactate (Lactic Acid) 18.3 (HH) 0.5 - 2.2 mmol/L   Troponin I   Result Value Ref Range    Troponin I 0.172 (HH) 0.012 - 0.034 ng/mL   Beta-Hydroxybutyrate, Serum   Result Value Ref Range    Beta-Hydroxybutyrate 8.62 (H) 0.21 - 2.81 mg/dL   CK   Result Value Ref Range     55 - 170 U/L   Troponin I   Result Value Ref Range    Troponin I 0.063 (H) 0.012 - 0.034 ng/mL   Ethanol   Result Value Ref Range    Alcohol, Serum <10 <10 mg/dL   Acetaminophen Level   Result Value Ref Range    Acetaminophen (Tylenol), Serum <10.0 10.0 - 20.0 ug/mL   Salicylate Level   Result Value Ref Range    Salicylate Lvl <1 0 - 19 mg/dL   Hepatitis B surface antibody   Result Value Ref Range    Hep B S Ab >1000.00 IU/L   Hepatitis B core antibody, total   Result Value Ref Range    Hepatitis B Core Ab Total Negative Negative   Hepatitis B surface antigen   Result Value Ref Range    Hepatitis B Surface Ag Negative    Hemoglobin A1c if not done in past 3 months   Result Value Ref Range    Hemoglobin A1C 7.5 (H) 0.0 - 5.6 %    Estimated Avg Glucose 169 (H) 68 - 131 mg/dL   CBC Auto Differential   Result Value Ref Range    WBC 10.20 3.90 - 12.70 K/uL    RBC 4.22 4.00 - 5.40 M/uL    Hemoglobin 13.0 12.0 - 16.0 g/dL    Hematocrit 39.2 37.0 - 48.5 %    MCV 93 82 - 98 fL    MCH 30.8 27.0 - 31.0 pg    MCHC 33.2 32.0 - 36.0 g/dL    RDW 17.7 (H) 11.5 - 14.5 %    Platelets 146 (L) 150 - 450 K/uL    MPV 11.7 9.2 - 12.9 fL    Immature Granulocytes 0.4 0.0 - 0.5 %    Gran # (ANC) 8.2 (H) 1.8 - 7.7 K/uL    Immature Grans (Abs) 0.04 0.00 - 0.04 K/uL    Lymph # 1.1 1.0 - 4.8 K/uL    Mono # 0.8 0.3 - 1.0 K/uL    Eos # 0.0 0.0 - 0.5 K/uL    Baso # 0.03 0.00 - 0.20 K/uL    nRBC 0 0 /100 WBC    Gran % 80.7 (H) 38.0 - 73.0 %    Lymph % 10.8 (L)  18.0 - 48.0 %    Mono % 7.8 4.0 - 15.0 %    Eosinophil % 0.0 0.0 - 8.0 %    Basophil % 0.3 0.0 - 1.9 %    Differential Method Automated    Comprehensive metabolic panel   Result Value Ref Range    Sodium 145 136 - 145 mmol/L    Potassium 4.4 3.5 - 5.1 mmol/L    Chloride 89 (L) 95 - 110 mmol/L    CO2 19 (L) 22 - 31 mmol/L    Glucose 136 (H) 70 - 110 mg/dL    BUN 20 (H) 7 - 18 mg/dL    Creatinine 4.60 (H) 0.50 - 1.40 mg/dL    Calcium 10.9 (H) 8.4 - 10.2 mg/dL    Total Protein 8.5 (H) 6.0 - 8.4 g/dL    Albumin 5.0 3.5 - 5.2 g/dL    Total Bilirubin 2.2 (H) 0.2 - 1.3 mg/dL    Alkaline Phosphatase 138 38 - 145 U/L     (H) 14 - 36 U/L    ALT 93 (H) 0 - 35 U/L    Anion Gap 37 (H) 8 - 16 mmol/L    eGFR 11 (A) >60 mL/min/1.73 m^2   Magnesium   Result Value Ref Range    Magnesium 2.0 1.6 - 2.6 mg/dL   Phosphorus   Result Value Ref Range    Phosphorus 6.4 (H) 2.7 - 4.5 mg/dL   Vitamin D   Result Value Ref Range    Vit D, 25-Hydroxy 56 30 - 96 ng/mL   PTH, intact   Result Value Ref Range    PTH, Intact 850.8 (H) 9.0 - 77.0 pg/mL   Troponin I   Result Value Ref Range    Troponin I 0.200 (HH) 0.012 - 0.034 ng/mL   TSH   Result Value Ref Range    TSH 2.500 0.400 - 4.000 uIU/mL   Lipid panel   Result Value Ref Range    Cholesterol 150 120 - 199 mg/dL    Triglycerides 141 30 - 150 mg/dL    HDL 60 40 - 75 mg/dL    LDL Cholesterol 61.8 (L) 63.0 - 159.0 mg/dL    HDL/Cholesterol Ratio 40.0 20.0 - 50.0 %    Total Cholesterol/HDL Ratio 2.5 2.0 - 5.0    Non-HDL Cholesterol 90 mg/dL   Hepatitis Panel, Acute   Result Value Ref Range    Hepatitis B Surface Ag Negative     Hep B C IgM Negative     Hep A IgM Negative     Hepatitis C Ab Negative    Lactic acid, plasma   Result Value Ref Range    Lactate (Lactic Acid) 2.3 (H) 0.5 - 2.2 mmol/L   Beta - Hydroxybutyrate, Serum   Result Value Ref Range    Beta-Hydroxybutyrate 15.10 (H) 0.21 - 2.81 mg/dL   Basic metabolic panel   Result Value Ref Range    Sodium 138 136 - 145 mmol/L    Potassium  4.2 3.5 - 5.1 mmol/L    Chloride 90 (L) 95 - 110 mmol/L    CO2 27 22 - 31 mmol/L    Glucose 194 (H) 70 - 110 mg/dL    BUN 21 (H) 7 - 18 mg/dL    Creatinine 3.67 (H) 0.50 - 1.40 mg/dL    Calcium 10.0 8.4 - 10.2 mg/dL    Anion Gap 21 (H) 8 - 16 mmol/L    eGFR 15 (A) >60 mL/min/1.73 m^2   CK   Result Value Ref Range    CPK 70 55 - 170 U/L   CBC Auto Differential   Result Value Ref Range    WBC 9.37 3.90 - 12.70 K/uL    RBC 4.51 4.00 - 5.40 M/uL    Hemoglobin 13.9 12.0 - 16.0 g/dL    Hematocrit 41.2 37.0 - 48.5 %    MCV 91 82 - 98 fL    MCH 30.8 27.0 - 31.0 pg    MCHC 33.7 32.0 - 36.0 g/dL    RDW 18.2 (H) 11.5 - 14.5 %    Platelets 193 150 - 450 K/uL    MPV 11.9 9.2 - 12.9 fL    Immature Granulocytes 0.6 (H) 0.0 - 0.5 %    Gran # (ANC) 7.1 1.8 - 7.7 K/uL    Immature Grans (Abs) 0.06 (H) 0.00 - 0.04 K/uL    Lymph # 1.2 1.0 - 4.8 K/uL    Mono # 0.9 0.3 - 1.0 K/uL    Eos # 0.1 0.0 - 0.5 K/uL    Baso # 0.05 0.00 - 0.20 K/uL    nRBC 2 (A) 0 /100 WBC    Gran % 76.3 (H) 38.0 - 73.0 %    Lymph % 12.9 (L) 18.0 - 48.0 %    Mono % 9.1 4.0 - 15.0 %    Eosinophil % 0.6 0.0 - 8.0 %    Basophil % 0.5 0.0 - 1.9 %    Differential Method Automated    Comprehensive Metabolic Panel   Result Value Ref Range    Sodium 137 136 - 145 mmol/L    Potassium 3.6 3.5 - 5.1 mmol/L    Chloride 85 (L) 95 - 110 mmol/L    CO2 31 22 - 31 mmol/L    Glucose 118 (H) 70 - 110 mg/dL    BUN 19 (H) 7 - 18 mg/dL    Creatinine 3.60 (H) 0.50 - 1.40 mg/dL    Calcium 10.2 8.4 - 10.2 mg/dL    Total Protein 8.4 6.0 - 8.4 g/dL    Albumin 4.5 3.5 - 5.2 g/dL    Total Bilirubin 2.2 (H) 0.2 - 1.3 mg/dL    Alkaline Phosphatase 126 38 - 145 U/L     (H) 14 - 36 U/L    ALT 93 (H) 0 - 35 U/L    Anion Gap 21 (H) 8 - 16 mmol/L    eGFR 15 (A) >60 mL/min/1.73 m^2   NT-Pro Natriuretic Peptide   Result Value Ref Range    NT-proBNP 305158 (H) 5 - 450 pg/mL   Lipase   Result Value Ref Range    Lipase Result 103 23 - 300 U/L   Amylase   Result Value Ref Range    Amylase 222 (H) 30  - 110 U/L   Beta-Hydroxybutyrate, Serum   Result Value Ref Range    Beta-Hydroxybutyrate 2.83 (H) 0.21 - 2.81 mg/dL   Basic metabolic panel   Result Value Ref Range    Sodium 131 (L) 136 - 145 mmol/L    Potassium 3.6 3.5 - 5.1 mmol/L    Chloride 85 (L) 95 - 110 mmol/L    CO2 28 22 - 31 mmol/L    Glucose 161 (H) 70 - 110 mg/dL    BUN 20 (H) 7 - 18 mg/dL    Creatinine 3.81 (H) 0.50 - 1.40 mg/dL    Calcium 9.5 8.4 - 10.2 mg/dL    Anion Gap 18 (H) 8 - 16 mmol/L    eGFR 14 (A) >60 mL/min/1.73 m^2   Magnesium   Result Value Ref Range    Magnesium 1.9 1.6 - 2.6 mg/dL   Phosphorus   Result Value Ref Range    Phosphorus 3.9 2.7 - 4.5 mg/dL   KRYSTLE IFA screen with reflex to titer and pattern   Result Value Ref Range    KRYSTLE IgG by IFA <1:80 <1:80    KRYSTLE Interpretive Comment See Note    Anti-DNA antibody, double-stranded   Result Value Ref Range    ds DNA Ab 36 (H) 0 - 24 IU   Cardiolipin antibody   Result Value Ref Range    Anticardiolipin IgG <10 <=14 GPL    Anticardiolipin IgM <10 <=12 MPL   C4 Complement   Result Value Ref Range    Complement (C-4) 26 11 - 44 mg/dL   C3 Complement   Result Value Ref Range    Complement (C-3) 62 50 - 180 mg/dL   Sedimentation rate   Result Value Ref Range    Sed Rate 19 0 - 19 mm/Hr   C-reactive protein   Result Value Ref Range    CRP 1.80 (H) 0.00 - 0.90 mg/dL   Cyclic citrul peptide antibody, IgG   Result Value Ref Range    CCP Antibodies <0.5 <5.0 U/mL   CK   Result Value Ref Range    CPK 33 (L) 55 - 170 U/L   Basic metabolic panel   Result Value Ref Range    Sodium 131 (L) 136 - 145 mmol/L    Potassium 4.1 3.5 - 5.1 mmol/L    Chloride 84 (L) 95 - 110 mmol/L    CO2 28 22 - 31 mmol/L    Glucose 164 (H) 70 - 110 mg/dL    BUN 20 (H) 7 - 18 mg/dL    Creatinine 4.03 (H) 0.50 - 1.40 mg/dL    Calcium 9.3 8.4 - 10.2 mg/dL    Anion Gap 19 (H) 8 - 16 mmol/L    eGFR 13 (A) >60 mL/min/1.73 m^2   Basic metabolic panel   Result Value Ref Range    Sodium 130 (L) 136 - 145 mmol/L    Potassium 4.3 3.5 - 5.1  mmol/L    Chloride 87 (L) 95 - 110 mmol/L    CO2 28 22 - 31 mmol/L    Glucose 163 (H) 70 - 110 mg/dL    BUN 21 (H) 7 - 18 mg/dL    Creatinine 4.21 (H) 0.50 - 1.40 mg/dL    Calcium 9.0 8.4 - 10.2 mg/dL    Anion Gap 15 8 - 16 mmol/L    eGFR 13 (A) >60 mL/min/1.73 m^2   Magnesium   Result Value Ref Range    Magnesium 1.9 1.6 - 2.6 mg/dL   Magnesium   Result Value Ref Range    Magnesium 1.8 1.6 - 2.6 mg/dL   Phosphorus   Result Value Ref Range    Phosphorus 3.5 2.7 - 4.5 mg/dL   Phosphorus   Result Value Ref Range    Phosphorus 3.4 2.7 - 4.5 mg/dL   Comprehensive Metabolic Panel   Result Value Ref Range    Sodium 130 (L) 136 - 145 mmol/L    Potassium 4.5 3.5 - 5.1 mmol/L    Chloride 85 (L) 95 - 110 mmol/L    CO2 27 22 - 31 mmol/L    Glucose 185 (H) 70 - 110 mg/dL    BUN 22 (H) 7 - 18 mg/dL    Creatinine 4.46 (H) 0.50 - 1.40 mg/dL    Calcium 8.9 8.4 - 10.2 mg/dL    Total Protein 7.3 6.0 - 8.4 g/dL    Albumin 3.7 3.5 - 5.2 g/dL    Total Bilirubin 1.9 (H) 0.2 - 1.3 mg/dL    Alkaline Phosphatase 105 38 - 145 U/L     (H) 14 - 36 U/L    ALT 76 (H) 0 - 35 U/L    Anion Gap 18 (H) 8 - 16 mmol/L    eGFR 12 (A) >60 mL/min/1.73 m^2   CBC Auto Differential   Result Value Ref Range    WBC 7.29 3.90 - 12.70 K/uL    RBC 4.16 4.00 - 5.40 M/uL    Hemoglobin 13.0 12.0 - 16.0 g/dL    Hematocrit 37.4 37.0 - 48.5 %    MCV 90 82 - 98 fL    MCH 31.3 (H) 27.0 - 31.0 pg    MCHC 34.8 32.0 - 36.0 g/dL    RDW 17.7 (H) 11.5 - 14.5 %    Platelets 187 150 - 450 K/uL    MPV 11.5 9.2 - 12.9 fL    Immature Granulocytes 0.5 0.0 - 0.5 %    Gran # (ANC) 5.2 1.8 - 7.7 K/uL    Immature Grans (Abs) 0.04 0.00 - 0.04 K/uL    Lymph # 1.0 1.0 - 4.8 K/uL    Mono # 0.8 0.3 - 1.0 K/uL    Eos # 0.1 0.0 - 0.5 K/uL    Baso # 0.07 0.00 - 0.20 K/uL    nRBC 1 (A) 0 /100 WBC    Gran % 71.2 38.0 - 73.0 %    Lymph % 14.0 (L) 18.0 - 48.0 %    Mono % 11.5 4.0 - 15.0 %    Eosinophil % 1.8 0.0 - 8.0 %    Basophil % 1.0 0.0 - 1.9 %    Differential Method Automated    Basic  metabolic panel   Result Value Ref Range    Sodium 129 (L) 136 - 145 mmol/L    Potassium 4.4 3.5 - 5.1 mmol/L    Chloride 86 (L) 95 - 110 mmol/L    CO2 28 22 - 31 mmol/L    Glucose 180 (H) 70 - 110 mg/dL    BUN 22 (H) 7 - 18 mg/dL    Creatinine 4.38 (H) 0.50 - 1.40 mg/dL    Calcium 9.1 8.4 - 10.2 mg/dL    Anion Gap 15 8 - 16 mmol/L    eGFR 12 (A) >60 mL/min/1.73 m^2   Magnesium   Result Value Ref Range    Magnesium 1.9 1.6 - 2.6 mg/dL   Magnesium   Result Value Ref Range    Magnesium 1.9 1.6 - 2.6 mg/dL   Phosphorus   Result Value Ref Range    Phosphorus 3.2 2.7 - 4.5 mg/dL   Phosphorus   Result Value Ref Range    Phosphorus 3.1 2.7 - 4.5 mg/dL   Basic metabolic panel   Result Value Ref Range    Sodium 130 (L) 136 - 145 mmol/L    Potassium 5.0 3.5 - 5.1 mmol/L    Chloride 85 (L) 95 - 110 mmol/L    CO2 27 22 - 31 mmol/L    Glucose 171 (H) 70 - 110 mg/dL    BUN 24 (H) 7 - 18 mg/dL    Creatinine 4.64 (H) 0.50 - 1.40 mg/dL    Calcium 8.9 8.4 - 10.2 mg/dL    Anion Gap 18 (H) 8 - 16 mmol/L    eGFR 11 (A) >60 mL/min/1.73 m^2   Magnesium   Result Value Ref Range    Magnesium 1.9 1.6 - 2.6 mg/dL   Phosphorus   Result Value Ref Range    Phosphorus 3.3 2.7 - 4.5 mg/dL   Osmolality, Serum   Result Value Ref Range    Osmolality 283 275 - 295 mOsm/kg   Basic metabolic panel   Result Value Ref Range    Sodium 129 (L) 136 - 145 mmol/L    Potassium 5.1 3.5 - 5.1 mmol/L    Chloride 86 (L) 95 - 110 mmol/L    CO2 23 22 - 31 mmol/L    Glucose 152 (H) 70 - 110 mg/dL    BUN 24 (H) 7 - 18 mg/dL    Creatinine 4.60 (H) 0.50 - 1.40 mg/dL    Calcium 9.1 8.4 - 10.2 mg/dL    Anion Gap 20 (H) 8 - 16 mmol/L    eGFR 11 (A) >60 mL/min/1.73 m^2   Magnesium   Result Value Ref Range    Magnesium 2.0 1.6 - 2.6 mg/dL   Phosphorus   Result Value Ref Range    Phosphorus 3.5 2.7 - 4.5 mg/dL   CBC Auto Differential   Result Value Ref Range    WBC 5.73 3.90 - 12.70 K/uL    RBC 3.77 (L) 4.00 - 5.40 M/uL    Hemoglobin 11.7 (L) 12.0 - 16.0 g/dL    Hematocrit  34.1 (L) 37.0 - 48.5 %    MCV 91 82 - 98 fL    MCH 31.0 27.0 - 31.0 pg    MCHC 34.3 32.0 - 36.0 g/dL    RDW 17.8 (H) 11.5 - 14.5 %    Platelets 158 150 - 450 K/uL    MPV 11.6 9.2 - 12.9 fL    Immature Granulocytes 0.9 (H) 0.0 - 0.5 %    Gran # (ANC) 3.9 1.8 - 7.7 K/uL    Immature Grans (Abs) 0.05 (H) 0.00 - 0.04 K/uL    Lymph # 0.7 (L) 1.0 - 4.8 K/uL    Mono # 0.7 0.3 - 1.0 K/uL    Eos # 0.2 0.0 - 0.5 K/uL    Baso # 0.05 0.00 - 0.20 K/uL    nRBC 1 (A) 0 /100 WBC    Gran % 68.9 38.0 - 73.0 %    Lymph % 12.6 (L) 18.0 - 48.0 %    Mono % 12.9 4.0 - 15.0 %    Eosinophil % 3.8 0.0 - 8.0 %    Basophil % 0.9 0.0 - 1.9 %    Differential Method Automated    Comprehensive Metabolic Panel   Result Value Ref Range    Sodium 126 (L) 136 - 145 mmol/L    Potassium 4.5 3.5 - 5.1 mmol/L    Chloride 84 (L) 95 - 110 mmol/L    CO2 27 22 - 31 mmol/L    Glucose 165 (H) 70 - 110 mg/dL    BUN 33 (H) 7 - 18 mg/dL    Creatinine 5.41 (H) 0.50 - 1.40 mg/dL    Calcium 8.3 (L) 8.4 - 10.2 mg/dL    Total Protein 6.8 6.0 - 8.4 g/dL    Albumin 3.6 3.5 - 5.2 g/dL    Total Bilirubin 1.5 (H) 0.2 - 1.3 mg/dL    Alkaline Phosphatase 158 (H) 38 - 145 U/L    AST 80 (H) 14 - 36 U/L    ALT 59 (H) 0 - 35 U/L    Anion Gap 15 8 - 16 mmol/L    eGFR 9 (A) >60 mL/min/1.73 m^2   Magnesium   Result Value Ref Range    Magnesium 1.8 1.6 - 2.6 mg/dL   Phosphorus   Result Value Ref Range    Phosphorus 2.7 2.7 - 4.5 mg/dL   Basic metabolic panel   Result Value Ref Range    Sodium 126 (L) 136 - 145 mmol/L    Potassium 4.5 3.5 - 5.1 mmol/L    Chloride 84 (L) 95 - 110 mmol/L    CO2 27 22 - 31 mmol/L    Glucose 165 (H) 70 - 110 mg/dL    BUN 33 (H) 7 - 18 mg/dL    Creatinine 5.41 (H) 0.50 - 1.40 mg/dL    Calcium 8.3 (L) 8.4 - 10.2 mg/dL    Anion Gap 15 8 - 16 mmol/L    eGFR 9 (A) >60 mL/min/1.73 m^2   Hemoglobin   Result Value Ref Range    Hemoglobin 11.6 (L) 12.0 - 16.0 g/dL   Magnesium   Result Value Ref Range    Magnesium 2.0 1.6 - 2.6 mg/dL   Phosphorus   Result Value Ref  Range    Phosphorus 2.5 (L) 2.7 - 4.5 mg/dL   Basic metabolic panel   Result Value Ref Range    Sodium 127 (L) 136 - 145 mmol/L    Potassium 4.6 3.5 - 5.1 mmol/L    Chloride 93 (L) 95 - 110 mmol/L    CO2 18 (L) 22 - 31 mmol/L    Glucose 128 (H) 70 - 110 mg/dL    BUN 25 (H) 7 - 18 mg/dL    Creatinine 4.14 (H) 0.50 - 1.40 mg/dL    Calcium 8.2 (L) 8.4 - 10.2 mg/dL    Anion Gap 16 8 - 16 mmol/L    eGFR 13 (A) >60 mL/min/1.73 m^2   POCT Arterial Blood Gas Once   Result Value Ref Range    POC PH 7.21 (LL) 7.35 - 7.45    POC PCO2 16 (LL) 35.0 - 45.0 mmHg    POC PO2 106 (H) 80.0 - 100.0 mmHg    POC THb 13.8 12.0 - 18.0 g/dL    POC O2Hb 95.6 94.0 - 100.0 %    POC COHb 1.3 <1.6 %    POC MetHb 0.6 <3.0 %    POC SATURATED O2 97.5 94.0 - 100.0 %    POC pH Temp 7.21 (LL) 7.35 - 7.45    POC pCO2 Temp CANCELED mmHg    POC pO2 Temp 106 (H) 80.0 - 100.0 mmHg    POC Temp 37.0 C    Mode #1 -     O2 Device #1 -     O2 Device #2 -     FiO2 21.0 %    Mech VT - mL    Mech Rate (BPM) - bpm    PiP - cm H2O    PEEP - cm H2O    Pressure Support - cm H2O    Pressure Control - cm H2O    Pulse Ox - %    IPAP - cm H2O    EPAP - cm H2O    Flow - LPM    Frequency - Hz    I Time -     Nitric - PPM    PAW - cmH2O    POC BE(B) -19.1 (L) -2.0 - 2.0 mmol/L    POC P/F Ratio 505 mmHg    POC pAO2 CANCELED mmHg    POC HCO3-(c) 6.4 (L) 22.0 - 26.0 mmol/L    POC A-aDO2 CANCELED mmHg    Date of Draw 20221104     Time of Draw 1715     Allens Test POS     Analyzed by: DC     Drawn by: DC     Sample Site RR    POCT Arterial Blood Gas Once   Result Value Ref Range    POC PH 7.46 (H) 7.35 - 7.45    POC PCO2 36 35.0 - 45.0 mmHg    POC PO2 174 (H) 80.0 - 100.0 mmHg    POC THb 12.6 12.0 - 18.0 g/dL    POC O2Hb 97.6 94.0 - 100.0 %    POC COHb 1.2 <1.6 %    POC MetHb 0.8 <3.0 %    POC SATURATED O2 99.6 94.0 - 100.0 %    POC pH Temp 7.46 (H) 7.35 - 7.45    POC pCO2 Temp 36 35.0 - 45.0 mmHg    POC pO2 Temp 174 (H) 80.0 - 100.0 mmHg    POC Temp 37.0 C    Mode #1 -     O2  Device #1 OxyMask     O2 Device #2 -     FiO2 32.0 %    Mech VT - mL    Mech Rate (BPM) - bpm    PiP - cm H2O    PEEP - cm H2O    Pressure Support - cm H2O    Pressure Control - cm H2O    Pulse Ox - %    IPAP - cm H2O    EPAP - cm H2O    Flow - LPM    Frequency - Hz    I Time -     Nitric - PPM    PAW - cmH2O    POC BE(B) 1.9 -2.0 - 2.0 mmol/L    POC P/F Ratio 544 mmHg    POC pAO2 183 mmHg    POC HCO3-(c) 25.6 22.0 - 26.0 mmol/L    POC A-aDO2 9 mmHg    Date of Draw 20221105     Time of Draw 1150     Allens Test POS     Analyzed by: DC     Drawn by: DC     Sample Site rr    Echo   Result Value Ref Range    BSA 1.87 m2    TDI SEPTAL 0.05 m/s    LV LATERAL E/E' RATIO 12.75 m/s    LV SEPTAL E/E' RATIO 10.20 m/s    LA WIDTH 2.78 cm    IVC diameter 1.91 cm    Left Ventricular Outflow Tract Mean Velocity 0.45 cm/s    Left Ventricular Outflow Tract Mean Gradient 1.00 mmHg    TDI LATERAL 0.04 m/s    PV PEAK VELOCITY 0.69 cm/s    LVIDd 3.87 3.5 - 6.0 cm    IVS 1.37 (A) 0.6 - 1.1 cm    Posterior Wall 1.29 (A) 0.6 - 1.1 cm    LVIDs 3.35 2.1 - 4.0 cm    FS 13 28 - 44 %    LA volume 42.42 cm3    STJ 2.52 cm    Ascending aorta 2.60 cm    LV mass 184.04 g    LA size 3.40 cm    Left Ventricle Relative Wall Thickness 0.67 cm    AV mean gradient 2 mmHg    AV valve area 3.12 cm2    AV Velocity Ratio 0.66     AV index (prosthetic) 0.99     MV mean gradient 1 mmHg    MV valve area p 1/2 method 2.47 cm2    MV valve area by continuity eq 1.98 cm2    E/A ratio 1.28     Mean e' 0.05 m/s    E wave deceleration time 254.00 msec    LVOT diameter 2.00 cm    LVOT area 3.1 cm2    LVOT peak surinder 0.74 m/s    LVOT peak VTI 13.50 cm    Ao peak surinder 1.12 m/s    Ao VTI 13.6 cm    LVOT stroke volume 42.39 cm3    AV peak gradient 5 mmHg    MV peak gradient 2 mmHg    E/E' ratio 11.33 m/s    MV Peak E Surinder 0.51 m/s    TR Max Surinder 3.62 m/s    MV VTI 21.4 cm    MV stenosis pressure 1/2 time 89.00 ms    MV Peak A Surinder 0.40 m/s    LV Systolic Volume 45.80 mL    LV  Systolic Volume Index 24.9 mL/m2    LV Diastolic Volume 64.70 mL    LV Diastolic Volume Index 35.16 mL/m2    LA Volume Index 23.1 mL/m2    LV Mass Index 100 g/m2    RA Major Axis 5.38 cm    Left Atrium Minor Axis 5.13 cm    Left Atrium Major Axis 5.44 cm    Triscuspid Valve Regurgitation Peak Gradient 52 mmHg    RA Width 4.78 cm    Right Atrial Pressure (from IVC) 15 mmHg    EF 50 %    TV rest pulmonary artery pressure 67 mmHg   POCT glucose   Result Value Ref Range    POCT Glucose 151 (H) 70 - 110 mg/dL   POCT glucose   Result Value Ref Range    POCT Glucose 169 (H) 70 - 110 mg/dL   POCT glucose   Result Value Ref Range    POCT Glucose 135 (H) 70 - 110 mg/dL   POCT glucose   Result Value Ref Range    POCT Glucose 157 (H) 70 - 110 mg/dL   POCT glucose   Result Value Ref Range    POCT Glucose 169 (H) 70 - 110 mg/dL   POCT glucose   Result Value Ref Range    POCT Glucose 186 (H) 70 - 110 mg/dL   POCT glucose   Result Value Ref Range    POCT Glucose 195 (H) 70 - 110 mg/dL   POCT glucose   Result Value Ref Range    POCT Glucose 120 (H) 70 - 110 mg/dL   POCT glucose   Result Value Ref Range    POCT Glucose 99 70 - 110 mg/dL   POCT glucose   Result Value Ref Range    POCT Glucose 112 (H) 70 - 110 mg/dL   POCT glucose   Result Value Ref Range    POCT Glucose 118 (H) 70 - 110 mg/dL   POCT glucose   Result Value Ref Range    POCT Glucose 111 (H) 70 - 110 mg/dL   POCT glucose   Result Value Ref Range    POCT Glucose 141 (H) 70 - 110 mg/dL   POCT glucose   Result Value Ref Range    POCT Glucose 106 70 - 110 mg/dL   POCT glucose   Result Value Ref Range    POCT Glucose 144 (H) 70 - 110 mg/dL   POCT glucose   Result Value Ref Range    POCT Glucose 167 (H) 70 - 110 mg/dL   POCT glucose   Result Value Ref Range    POCT Glucose 155 (H) 70 - 110 mg/dL   POCT glucose   Result Value Ref Range    POCT Glucose 153 (H) 70 - 110 mg/dL   POCT glucose   Result Value Ref Range    POCT Glucose 158 (H) 70 - 110 mg/dL   POCT glucose   Result  Value Ref Range    POCT Glucose 167 (H) 70 - 110 mg/dL   POCT glucose   Result Value Ref Range    POCT Glucose 161 (H) 70 - 110 mg/dL   POCT glucose   Result Value Ref Range    POCT Glucose 179 (H) 70 - 110 mg/dL   POCT glucose   Result Value Ref Range    POCT Glucose 168 (H) 70 - 110 mg/dL   POCT glucose   Result Value Ref Range    POCT Glucose 153 (H) 70 - 110 mg/dL   POCT glucose   Result Value Ref Range    POCT Glucose 202 (H) 70 - 110 mg/dL   POCT glucose   Result Value Ref Range    POCT Glucose 152 (H) 70 - 110 mg/dL   POCT glucose   Result Value Ref Range    POCT Glucose 164 (H) 70 - 110 mg/dL   POCT glucose   Result Value Ref Range    POCT Glucose 196 (H) 70 - 110 mg/dL   POCT glucose   Result Value Ref Range    POCT Glucose 171 (H) 70 - 110 mg/dL   POCT glucose   Result Value Ref Range    POCT Glucose 172 (H) 70 - 110 mg/dL   POCT glucose   Result Value Ref Range    POCT Glucose 109 70 - 110 mg/dL   POCT glucose   Result Value Ref Range    POCT Glucose 100 70 - 110 mg/dL   POCT glucose   Result Value Ref Range    POCT Glucose 178 (H) 70 - 110 mg/dL   POCT glucose   Result Value Ref Range    POCT Glucose 119 (H) 70 - 110 mg/dL   POCT glucose   Result Value Ref Range    POCT Glucose 91 70 - 110 mg/dL   POCT glucose   Result Value Ref Range    POCT Glucose 202 (H) 70 - 110 mg/dL        Significant Imaging: Echo  · The left ventricle is normal in size with moderate concentric   hypertrophy and low normal systolic function.  · There is mild left ventricular global hypokinesis.  · The estimated ejection fraction is 50+/-5%.  · Mild right ventricular enlargement with mildly reduced right ventricular   systolic function.  · Severe tricuspid regurgitation.  · Mild mitral regurgitation.  · Elevated central venous pressure (15 mmHg).  · The estimated PA systolic pressure is 67 mmHg.  · There is moderate to severe pulmonary hypertension.

## 2022-11-10 NOTE — ASSESSMENT & PLAN NOTE
--Cont protonix 40mg BID X 2 months  --Sucralfate qid X 2 weeks  --OP f/u with GI after discharge

## 2022-11-10 NOTE — PLAN OF CARE
[facility] - Telemetry (Hospital)  Initial Discharge Assessment    Medical chart review completed d/t pt recently assessed. SW to continue following to assist with discharge needs.     SW to f/u and update pt's whiteboard to reflect CM contact.     Primary Care Provider: Roselia Rose MD    Admission Diagnosis: Dialysis AV fistula malfunction [T82.590A]    Admission Date: 11/9/2022  Expected Discharge Date:     Discharge Barriers Identified: None    Payor: [facility] [employer] [facility] MCARE / Plan: [employer] / Product Type: Medicare Advantage /     Extended Emergency Contact Information  Primary Emergency Contact: Kenrick Schroeder  Address: [id number] [place] [address]            [place] [address] [facility]  Home Phone: [phone]  Mobile Phone: [phone]  Relation: Spouse    Discharge Plan A: Home, Home with family  Discharge Plan B: Home Health      [facility] [address] [place] [address] - [address] [address]  Phone: [phone] Fax: [phone]    [facility] [address] [address]  Phone: [phone] Fax: [phone]    [facility] - [address] [address]  Phone: [phone] Fax: [phone]      Initial Assessment (most recent)       Adult Discharge Assessment - 11/10/22 1251          Discharge Assessment    Assessment Type Discharge Planning Assessment     Confirmed/corrected address, phone number and insurance Yes     Confirmed Demographics Correct on Facesheet     Source of Information health record     Communicated MARK with patient/caregiver Date not available/Unable to determine     Reason For Admission Complication of AV dialysis fistula     Lives With spouse     Facility Arrived From: [facility]     Do you expect to return to  your current living situation? Yes     Do you have help at home or someone to help you manage your care at home? Yes     Who are your caregiver(s) and their phone number(s)? Spouse, Kenrick     Prior to hospitilization cognitive status: Alert/Oriented     Current cognitive status: Alert/Oriented     Walking or Climbing Stairs Difficulty none     Dressing/Bathing Difficulty none     Equipment Currently Used at Home none     Readmission within 30 days? No     Patient currently being followed by outpatient case management? No     Do you currently have service(s) that help you manage your care at home? No     Do you take prescription medications? Yes     Do you have prescription coverage? No     Do you have any problems affording any of your prescribed medications? No     Is the patient taking medications as prescribed? yes     Who is going to help you get home at discharge? Spouse     How do you get to doctors appointments? car, drives self     Are you on dialysis? Yes     Dialysis Name and Scheduled days [facility] [place]     Do you take coumadin? No     Discharge Plan A Home;Home with family     Discharge Plan B Home Health     DME Needed Upon Discharge  none     Discharge Barriers Identified None

## 2022-11-10 NOTE — OP NOTE
[facility])  Vascular Surgery  Operative Note    SUMMARY     Date of Procedure: 11/10/2022     Procedure: Procedure(s) (LRB):  FISTULOGRAM (N/A)   With 8 x 100 mm balloon angioplasty of 90% distal anastomotic stricture    Surgeon(s) and Role:     * Tony [place] MD - Primary    Assisting Surgeon: None    Pre-Operative Diagnosis: Complication of arteriovenous dialysis fistula, initial encounter [T82.9XXA]    Post-Operative Diagnosis: Post-Op Diagnosis Codes:     * Complication of arteriovenous dialysis fistula, initial encounter [T82.9XXA]    Anesthesia: RN IV Sedation    Operative Findings (including complications, if any):  After consent was obtained risks and benefits explained the patient brought to the angio suite in the supine position.  Once anesthesia was administered the left arm was prepped and draped in a sterile fashion.  With ultrasound guidance we successfully cannulated the left arm fistula.  We then performed a fistulogram and angioplasty with completion fistulogram.  The puncture site was closed with 4-0 Prolene.  The patient tolerated the procedure well.    Description of Technical Procedures:  Ultrasound guided left forearm fistulogram with angioplasty as above:  Successful angioplasty with widely patent central venous system and fistula at the end of the case    Significant Surgical Tasks Conducted by the Assistant(s), if Applicable:  None    Estimated Blood Loss (EBL): * No values recorded between 11/10/2022  3:06 PM and 11/10/2022  3:21 PM *           Implants: * No implants in log *    Specimens:   Specimen (24h ago, onward)      None                    Condition: Good    Disposition: PACU - hemodynamically stable.    Attestation: I was present and scrubbed for the entire procedure.    Recommendations:  No further vascular intervention is needed since the bleeding was from a needle stick because of outflow obstruction which we successfully alleviated just now.  She has no  vascular surgeon to follow up with since they surgeon who placed her access has retired.  Please have her follow up with us in our [place] vascular clinic in the next 2-3 weeks.

## 2022-11-10 NOTE — HPI
Patient is a 47-year-old aa female with past medical history significant for hypertension, type 2 diabetes mellitus, CAD status post cardiac stenting and CABG, chronic diastolic CHF, paroxysmal AFib status post cardioversion, on Eliquis, ESRD on hemodialysis every Tuesday, Thursday and Saturday, gastritis, diabetic gastroparesis, who presented as a transfer from [facility] for evaluation of bleeding AV fistula. Patient was recently admitted for abdominal pain along with nausea and vomiting. CT abd and pelvis, CTA, and MRCP were unremarkable. She was treated for dka. GI consulted on case and EGD performed on 11/9 showed gastritis. Nephro consulted on case for dialysis. AV fistula started bleeding on 11/8. Pressure was applied however fistula continued to bleed. A small stitch was placed. Nephrology then recommended transfer for fistulogram and vascular surgery consult. Case was discussed with Dr. Nielson and patient was transferred to [facility]. AV fistula currently not bleeding and patient is without any complaints.

## 2022-11-10 NOTE — PLAN OF CARE
Problem: Adult Inpatient Plan of Care  Goal: Plan of Care Review  Outcome: Ongoing, Progressing     Problem: Fluid and Electrolyte Imbalance (Acute Kidney Injury/Impairment)  Goal: Fluid and Electrolyte Balance  Outcome: Ongoing, Progressing   POC reviewed, verbalized understanding. Pt remained free from falls, fall precautions in place. VSS. No other c/o at this time. PIV intact, NS at 75. Call bell and personal belongings within reach. Hourly rounding complete. Reminded to call for assistance. Will continue to monitor.

## 2022-11-10 NOTE — PROGRESS NOTES
[facility])  Nephrology  Progress Note    Patient Name: Gilda Schroeder  MRN: [id number]  Admission Date: 11/9/2022  Hospital Length of Stay: 1 days  Attending Provider: Nina Field, *   Primary Care Physician: Roselia Rose MD  Principal Problem:Complication of AV dialysis fistula    Inpatient consult to Nephrology  Consult performed by: Buddy Smith MD  Consult ordered by: Audi Lucia MD  Reason for consult: End-stage renal disease      Subjective:     Interval History:  47-year-old female with history of end-stage renal disease.  Transferred from another facility secondary to bleeding dialysis access.  Patient was seen in her hospital room.  In bed resting comfortably.  No acute distress noted.  States that her access has a stitch in it but they are still some minor oozing.  A family member was at the bedside.  All nephrology related questions were answered to their satisfaction.    Review of systems:  No shortness of breath or chest discomfort.  No fevers or chills.  No nausea vomiting diarrhea.  No significant swelling.    Review of patient's allergies indicates:  No Known Allergies  Current Facility-Administered Medications   Medication Frequency    0.9%  NaCl infusion Continuous    acetaminophen tablet 650 mg Q6H PRN    buPROPion TBSR 12 hr tablet 100 mg BID    carvediloL tablet 6.25 mg BID    dextrose 10% bolus 125 mL PRN    dextrose 10% bolus 250 mL PRN    EScitalopram oxalate tablet 20 mg QAM    glucagon (human recombinant) injection 1 mg PRN    hydrALAZINE injection 10 mg Q6H PRN    influenza (QUADRIVALENT PF) vaccine 0.5 mL Prior to discharge    insulin aspart U-100 pen 0-5 Units Q6H PRN    losartan tablet 100 mg Daily    metoclopramide HCl tablet 5 mg Q6H    mupirocin 2 % ointment BID    ondansetron tablet 4 mg Q6H PRN    pantoprazole EC tablet 40 mg Daily       Objective:     Vital Signs (Most Recent):  Temp: 97.5 °F (36.4 °C) (11/10/22 0816)  Pulse: 68  (11/10/22 0816)  Resp: 18 (11/10/22 0816)  BP: (!) 107/55 (11/10/22 0816)  SpO2: 97 % (11/10/22 0816)  O2 Device (Oxygen Therapy): room air (11/09/22 2345)   Vital Signs (24h Range):  Temp:  [97.3 °F (36.3 °C)-98.2 °F (36.8 °C)] 97.5 °F (36.4 °C)  Pulse:  [60-75] 68  Resp:  [9-19] 18  SpO2:  [94 %-99 %] 97 %  BP: ()/(31-85) 107/55     Weight: 77.6 kg (171 lb 1.2 oz) (11/10/22 0436)  Body mass index is 28.47 kg/m².  Body surface area is 1.89 meters squared.    No intake/output data recorded.    Physical Exam  Constitutional:       Appearance: Normal appearance.   HENT:      Head: Normocephalic and atraumatic.   Eyes:      General: No scleral icterus.     Extraocular Movements: Extraocular movements intact.      Pupils: Pupils are equal, round, and reactive to light.   Cardiovascular:      Rate and Rhythm: Normal rate and regular rhythm.      Pulses: Normal pulses.      Heart sounds: Normal heart sounds.   Musculoskeletal:      Right lower leg: No edema.      Left lower leg: No edema.   Skin:     General: Skin is warm and dry.      Comments: Left upper extremity AV graft.  There is a bandage in place with some minor evidence of oozing.  Access is pulsatile on exam.   Neurological:      General: No focal deficit present.      Mental Status: She is alert and oriented to person, place, and time.   Psychiatric:         Mood and Affect: Mood normal.         Behavior: Behavior normal.       Significant Labs:sureBMP:   Recent Labs   Lab 11/09/22  0520 11/10/22  0459   * 111*   * 127*   K 4.6 4.7   CL 93* 93*   CO2 18* 21*   BUN 25* 34*   CREATININE 4.14* 5.5*   CALCIUM 8.2* 8.0*   MG 2.0  --      CMP:   Recent Labs   Lab 11/08/22  0447 11/09/22  0520 11/10/22  0459   *  165*   < > 111*   CALCIUM 8.3*  8.3*   < > 8.0*   ALBUMIN 3.6  --   --    PROT 6.8  --   --    *  126*   < > 127*   K 4.5  4.5   < > 4.7   CO2 27  27   < > 21*   CL 84*  84*   < > 93*   BUN 33*  33*   < > 34*    CREATININE 5.41*  5.41*   < > 5.5*   ALKPHOS 158*  --   --    ALT 59*  --   --    AST 80*  --   --    BILITOT 1.5*  --   --     < > = values in this interval not displayed.     All labs within the past 24 hours have been reviewed.    Significant Imaging:  Labs: Reviewed      Assessment/Plan:     Active Diagnoses:    Diagnosis Date Noted POA    PRINCIPAL PROBLEM:  Complication of AV dialysis fistula [T82.9XXA] 11/09/2022 Yes    Gastritis [K29.70]  Yes    Gastroparesis [K31.84] 01/17/2018 Yes    Type 2 diabetes mellitus [E11.9] 01/17/2018 Yes    ESRD (end stage renal disease) [N18.6] 08/18/2017 Yes    Anemia in CKD (chronic kidney disease) [N18.9, D63.1] 07/05/2016 Yes      Problems Resolved During this Admission:       1. End-stage renal disease:  As there is no urgent need for dialysis at this point.  Will plan to hold until evaluated by vascular surgery.  She may need a fistulogram to look for an outflow obstruction.    2. Electrolytes:  Potassium is stable 4.7.    3. Acid base: Bicarbonate is stable at 21.    4. Volume:  No evidence of volume overload.    5. Access malfunction:  Persistent bleeding from her left upper extremity AV graft.  Awaiting evaluation by vascular surgery.  If they do not plan for an angiogram today will plan to try to dialyze this afternoon.  Otherwise we will try to coordinate dialysis after she has an angiogram.    Proximally 40 minutes was spent in face-to-face conversation, chart review and coordination of care with other providers.    Thank you for your consult.     Buddy Smith MD  Nephrology  [facility] - Telemetry ([facility])

## 2022-11-10 NOTE — H&P
[facility] Medicine  History & Physical    Patient Name: Gilda Schroeder  MRN: [id number]  Patient Class: IP- Inpatient  Admission Date: 11/9/2022  Attending Physician: Audi Lucia MD   Primary Care Provider: Roselia Rose MD         Patient information was obtained from patient and ER records.     Subjective:     Principal Problem:Complication of AV dialysis fistula    Chief Complaint:   Chief Complaint   Patient presents with    bleeding fistula        HPI: Patient is a 47-year-old aa female with past medical history significant for hypertension, type 2 diabetes mellitus, CAD status post cardiac stenting and CABG, chronic diastolic CHF, paroxysmal AFib status post cardioversion, on Eliquis, ESRD on hemodialysis every Tuesday, Thursday and Saturday, gastritis, diabetic gastroparesis, who presented as a transfer from [facility] for evaluation of bleeding AV fistula. Patient was recently admitted for abdominal pain along with nausea and vomiting. CT abd and pelvis, CTA, and MRCP were unremarkable. She was treated for dka. GI consulted on case and EGD performed on 11/9 showed gastritis. Nephro consulted on case for dialysis. AV fistula started bleeding on 11/8. Pressure was applied however fistula continued to bleed. A small stitch was placed. Nephrology then recommended transfer for fistulogram and vascular surgery consult. Case was discussed with Dr. Nielson and patient was transferred to [facility]. AV fistula currently not bleeding and patient is without any complaints.       Past Medical History:   Diagnosis Date    Allergy     morphine    Anemia     Anticoagulant long-term use     Atrial fibrillation     CAD (coronary artery disease)     stent x 2    Chronic diastolic CHF (congestive heart failure)     Coronary artery disease involving native coronary artery of native heart with unstable angina pectoris 11/16/2020    Diabetes mellitus     type 2    Diabetes  mellitus with ESRD (end-stage renal disease) 2016    Disorder of kidney and ureter     ESRD (end stage renal disease) on dialysis     ESRD on hemodialysis     T/T/S    Gastritis     Gastroesophageal reflux disease without esophagitis 01/10/2018    Gastroparesis     History of heart artery stent 2020    HTN (hypertension)     Hypothyroidism, unspecified 10/28/2019    Obesity     Peritonitis associated with peritoneal dialysis 2017    Reactive depression 2022    S/P CABG x 5 2020    Tricuspid regurgitation        Past Surgical History:   Procedure Laterality Date    ANGIOGRAM, CORONARY, WITH LEFT HEART CATHETERIZATION N/A 2022    Procedure: Angiogram, Coronary, with Left Heart Cath;  Surgeon: Magui Bush MD;  Location: [facility] CATH;  Service: Cardiology;  Laterality: N/A;    AV FISTULA PLACEMENT      CARDIOVERSION       SECTION      COLONOSCOPY N/A 2022    Procedure: COLONOSCOPY;  Surgeon: Ranulfo Marcano MD;  Location: [facility] ENDO;  Service: Endoscopy;  Laterality: N/A;    CORONARY ANGIOGRAPHY N/A 2019    Procedure: ANGIOGRAM, CORONARY ARTERY;  Surgeon: Lucho Crowder MD;  Location: [facility] CATH;  Service: Cardiology;  Laterality: N/A;    CORONARY ANGIOGRAPHY N/A 2019    Procedure: ANGIOGRAM, CORONARY ARTERY;  Surgeon: Toñito Vincent MD;  Location: [facility] CATH;  Service: Cardiology;  Laterality: N/A;    CORONARY ANGIOGRAPHY N/A 2020    Procedure: ANGIOGRAM, CORONARY ARTERY;  Surgeon: Toñito Vincent MD;  Location: [facility] CATH;  Service: Cardiology;  Laterality: N/A;    CORONARY ANGIOGRAPHY INCLUDING BYPASS GRAFTS WITH CATHETERIZATION OF LEFT HEART N/A 2022    Procedure: ANGIOGRAM, CORONARY, INCLUDING BYPASS GRAFT, WITH LEFT HEART CATHETERIZATION;  Surgeon: Magui Bush MD;  Location: [facility] CATH;  Service: Cardiology;  Laterality: N/A;    CORONARY ARTERY BYPASS GRAFT (CABG) N/A 2020    Procedure: CORONARY ARTERY BYPASS  GRAFT (CABG) X 5 VESSELS;  Surgeon: Jasson Mcallister MD;  Location: [facility] OR;  Service: Cardiovascular;  Laterality: N/A;    ENDOSCOPIC HARVEST OF VEIN Left 11/16/2020    Procedure: SURGICAL PROCUREMENT, VEIN, ENDOSCOPIC;  Surgeon: Jasson Mcallister MD;  Location: [facility] OR;  Service: Cardiovascular;  Laterality: Left;    ESOPHAGOGASTRODUODENOSCOPY N/A 03/14/2019    Procedure: EGD (ESOPHAGOGASTRODUODENOSCOPY);  Surgeon: Perry Washburn MD;  Location: [facility] ENDO;  Service: Endoscopy;  Laterality: N/A;    heart stent  12/2017    LEFT HEART CATHETERIZATION Right 03/16/2019    Procedure: Left heart cath;  Surgeon: Lucho Crowder MD;  Location: [facility] CATH;  Service: Cardiology;  Laterality: Right;    LEFT HEART CATHETERIZATION Left 06/10/2019    Procedure: Left heart cath, 330 pm start;  Surgeon: Qasim Guzman MD;  Location: [facility] CATH LAB;  Service: Cardiology;  Laterality: Left;    LEFT HEART CATHETERIZATION Left 11/08/2019    Procedure: Left heart cath-  # 223 A;  Surgeon: Lucho Crowder MD;  Location: [facility] CATH;  Service: Cardiology;  Laterality: Left;    LEFT HEART CATHETERIZATION Left 06/25/2020    Procedure: CATHETERIZATION, HEART, LEFT;  Surgeon: Toñito Vincent MD;  Location: [facility] CATH;  Service: Cardiology;  Laterality: Left;    LEFT HEART CATHETERIZATION Right 11/11/2020    Procedure: Left heart cath;  Surgeon: Toñito Vincent MD;  Location: [facility] CATH;  Service: Cardiology;  Laterality: Right;    PERCUTANEOUS TRANSLUMINAL BALLOON ANGIOPLASTY OF CORONARY ARTERY  06/10/2019    Procedure: Angioplasty-coronary;  Surgeon: Qasim Guzman MD;  Location: [facility] CATH LAB;  Service: Cardiology;;    PERCUTANEOUS TRANSLUMINAL BALLOON ANGIOPLASTY OF CORONARY ARTERY  11/20/2019    Procedure: Angioplasty-coronary;  Surgeon: Toñito Vincent MD;  Location: [facility] CATH;  Service: Cardiology;;    PLACEMENT PERITONEAL DIALYSIS CATHETER      PORTACATH PLACEMENT      for dialysis; removed    SKIN GRAFT   10/2017    TUBAL LIGATION         Review of patient's allergies indicates:  No Known Allergies    Current Facility-Administered Medications on File Prior to Encounter   Medication    [COMPLETED] LIDOcaine (PF) 10 mg/ml (1%) 10 mg/mL (1 %) injection    [COMPLETED] mupirocin 2 % ointment    [DISCONTINUED] 0.9%  NaCl infusion    [DISCONTINUED] 0.9%  NaCl infusion    [DISCONTINUED] 0.9%  NaCl infusion    [DISCONTINUED] 0.9%  NaCl infusion    [DISCONTINUED] 0.9%  NaCl infusion    [DISCONTINUED] 0.9%  NaCl infusion    [DISCONTINUED] acetaminophen tablet 650 mg    [DISCONTINUED] aspirin EC tablet 81 mg    [DISCONTINUED] atorvastatin tablet 80 mg    [DISCONTINUED] buPROPion TBSR 12 hr tablet 100 mg    [DISCONTINUED] carvediloL tablet 6.25 mg    [DISCONTINUED] cinacalcet tablet 60 mg    [DISCONTINUED] cloNIDine 0.3 mg/24 hr td ptwk 1 patch    [DISCONTINUED] dextrose 10 % infusion    [DISCONTINUED] dextrose 10 % infusion    [DISCONTINUED] dextrose 50% injection 12.5 g    [DISCONTINUED] dextrose 50% injection 12.5 g    [DISCONTINUED] dextrose 50% injection 25 g    [DISCONTINUED] enoxaparin injection 40 mg    [DISCONTINUED] EScitalopram oxalate tablet 20 mg    [DISCONTINUED] ferrous sulfate tablet 1 each    [DISCONTINUED] glucagon (human recombinant) injection 1 mg    [DISCONTINUED] hydrALAZINE injection 10 mg    [DISCONTINUED] influenza (QUADRIVALENT PF) vaccine 0.5 mL    [DISCONTINUED] insulin aspart U-100 pen 1-10 Units    [DISCONTINUED] insulin aspart U-100 pen 4 Units    [DISCONTINUED] insulin detemir U-100 pen 4 Units    [DISCONTINUED] labetalol 20 mg/4 mL (5 mg/mL) IV syring    [DISCONTINUED] losartan tablet 100 mg    [DISCONTINUED] metoclopramide HCl injection 10 mg    [DISCONTINUED] ondansetron injection 4 mg    [DISCONTINUED] pantoprazole injection 40 mg    [DISCONTINUED] piperacillin-tazobactam 4.5 g in dextrose 5 % 100 mL IVPB (ready to mix system)    [DISCONTINUED]  prochlorperazine injection Soln 10 mg    [DISCONTINUED] propofol (DIPRIVAN) 10 mg/mL infusion    [DISCONTINUED] senna-docusate 8.6-50 mg per tablet 1 tablet    [DISCONTINUED] sodium chloride 0.9% bolus 250 mL    [DISCONTINUED] sodium chloride 0.9% bolus 250 mL    [DISCONTINUED] sodium chloride 0.9% bolus 250 mL    [DISCONTINUED] sodium chloride 0.9% flush 10 mL    [DISCONTINUED] sodium chloride 0.9% infusion    [DISCONTINUED] thiamine (B-1) 250 mg in dextrose 5 % 100 mL IVPB     Current Outpatient Medications on File Prior to Encounter   Medication Sig    acetaminophen (TYLENOL) 500 MG tablet Take 500 mg by mouth every 6 (six) hours as needed for Pain (headache).    aspirin (ECOTRIN) 81 MG EC tablet Take 81 mg by mouth every morning.    atorvastatin (LIPITOR) 80 MG tablet Take 1 tablet (80 mg total) by mouth every evening.    blood sugar diagnostic Strp To check BG 3-4 times daily, to use with insurance preferred meter  E 11.65    blood-glucose meter kit To check BG 3 to 4 times daily, to use with insurance preferred meter  DX E 11.65    buPROPion (WELLBUTRIN SR) 100 MG TBSR 12 hr tablet Take 1 tablet (100 mg total) by mouth 2 (two) times daily.    carvediloL (COREG) 6.25 MG tablet Take 1 tablet (6.25 mg total) by mouth 2 (two) times daily.    cinacalcet (SENSIPAR) 60 MG Tab Take 60 mg by mouth daily with breakfast.    cloNIDine 0.3 mg/24 hr td ptwk (CATAPRES) 0.3 mg/24 hr Place 1 patch onto the skin every 7 days. (Patient taking differently: Place 1 patch onto the skin every Sunday.)    EScitalopram oxalate (LEXAPRO) 20 MG tablet Take 20 mg by mouth every morning.    ferrous sulfate 325 (65 FE) MG EC tablet Take 1 tablet (325 mg total) by mouth 2 (two) times daily.    glucagon (GVOKE HYPOPEN 2-PACK) 1 mg/0.2 mL AtIn Inject 1 mg into the skin as needed (as needed for emergent hypoglycemia (low bloodsugar)). Diagnosis Code: E11.65    hydrALAZINE (APRESOLINE) 20 mg/mL injection Inject 0.5 mLs (10  mg total) into the vein every 8 (eight) hours as needed (for SBP greater than 170).    insulin detemir U-100 (LEVEMIR FLEXTOUCH) 100 unit/mL (3 mL) SubQ InPn pen Inject 4 Units into the skin every evening.    lancets Misc To check BG 3-4 times daily, to use with insurance preferred meter  E 11.65    losartan (COZAAR) 100 MG tablet Take 1 tablet (100 mg total) by mouth once daily. (Patient taking differently: Take 100 mg by mouth every morning.)    metoclopramide HCl (REGLAN) 5 mg/mL injection Inject 2 mLs (10 mg total) into the vein every 8 (eight) hours.    pantoprazole (PROTONIX) 40 mg injection Inject 40 mg into the vein once daily.    piperacillin sodium/tazobactam (PIPERACILLIN-TAZOBACTAM 4.5G/100ML D5W IVPB, READY TO MIX,) Inject 100 mLs (4.5 g total) into the vein every 12 (twelve) hours.    [DISCONTINUED] insulin aspart U-100 (NOVOLOG) 100 unit/mL (3 mL) InPn pen Blood Glucose  (mg/dL)    Pre-meal   Bedtime  151-200       2 units       1 unit  201-250       4 units       2 units  251-300       6 units       3 units  301-350       8 units       4 units  >350          10 units       5 units     Family History       Problem Relation (Age of Onset)    Breast cancer Sister (38)    Diabetes Mother    No Known Problems Father, Brother          Tobacco Use    Smoking status: Former     Packs/day: 0.50     Years: 12.00     Pack years: 6.00     Types: Cigarettes     Quit date:      Years since quittin.8    Smokeless tobacco: Never   Substance and Sexual Activity    Alcohol use: Not Currently    Drug use: No    Sexual activity: Yes     Partners: Male     Review of Systems   Constitutional:  Negative for fatigue and fever.   HENT:  Negative for sinus pressure.    Eyes:  Negative for visual disturbance.   Respiratory:  Negative for shortness of breath.    Cardiovascular:  Negative for chest pain.   Gastrointestinal:  Negative for nausea and vomiting.   Genitourinary:  Negative for difficulty  urinating.   Musculoskeletal:  Negative for back pain.   Skin:  Negative for rash.   Neurological:  Negative for headaches.   Psychiatric/Behavioral:  Negative for confusion.    Objective:     Vital Signs (Most Recent):  Temp: 97.3 °F (36.3 °C) (11/10/22 0436)  Pulse: 70 (11/10/22 0436)  Resp: 18 (11/10/22 0436)  BP: 139/65 (11/10/22 0436)  SpO2: 99 % (11/10/22 0436) Vital Signs (24h Range):  Temp:  [97.3 °F (36.3 °C)-98.2 °F (36.8 °C)] 97.3 °F (36.3 °C)  Pulse:  [58-75] 70  Resp:  [9-19] 18  SpO2:  [94 %-99 %] 99 %  BP: ()/(31-85) 139/65        There is no height or weight on file to calculate BMI.    Physical Exam  Constitutional:       General: She is not in acute distress.     Appearance: She is well-developed. She is not diaphoretic.   HENT:      Head: Normocephalic and atraumatic.   Eyes:      Pupils: Pupils are equal, round, and reactive to light.   Cardiovascular:      Rate and Rhythm: Normal rate and regular rhythm.      Heart sounds: Normal heart sounds. No murmur heard.    No friction rub. No gallop.      Comments: AV fistula left arm, thrill palpable  Pulmonary:      Effort: Pulmonary effort is normal. No respiratory distress.      Breath sounds: Normal breath sounds. No stridor. No wheezing or rales.   Abdominal:      General: Bowel sounds are normal. There is no distension.      Palpations: Abdomen is soft. There is no mass.      Tenderness: There is no abdominal tenderness. There is no guarding.   Skin:     General: Skin is warm.      Findings: No erythema.   Neurological:      Mental Status: She is alert and oriented to person, place, and time.         CRANIAL NERVES     CN III, IV, VI   Pupils are equal, round, and reactive to light.     Significant Labs:   Results for orders placed or performed during the hospital encounter of 11/04/22   Blood culture    Specimen: Blood   Result Value Ref Range    Blood Culture, Routine No growth after 5 days.    Blood culture    Specimen: Antecubital, Right;  Blood   Result Value Ref Range    Blood Culture, Routine No growth after 5 days.    CBC auto differential   Result Value Ref Range    WBC 10.46 3.90 - 12.70 K/uL    RBC 4.87 4.00 - 5.40 M/uL    Hemoglobin 15.1 12.0 - 16.0 g/dL    Hematocrit 44.7 37.0 - 48.5 %    MCV 92 82 - 98 fL    MCH 31.0 27.0 - 31.0 pg    MCHC 33.8 32.0 - 36.0 g/dL    RDW 18.0 (H) 11.5 - 14.5 %    Platelets 189 150 - 450 K/uL    MPV 12.8 9.2 - 12.9 fL    Immature Granulocytes 0.7 (H) 0.0 - 0.5 %    Gran # (ANC) 7.7 1.8 - 7.7 K/uL    Immature Grans (Abs) 0.07 (H) 0.00 - 0.04 K/uL    Lymph # 1.6 1.0 - 4.8 K/uL    Mono # 1.0 0.3 - 1.0 K/uL    Eos # 0.0 0.0 - 0.5 K/uL    Baso # 0.08 0.00 - 0.20 K/uL    nRBC 0 0 /100 WBC    Gran % 73.2 (H) 38.0 - 73.0 %    Lymph % 15.6 (L) 18.0 - 48.0 %    Mono % 9.6 4.0 - 15.0 %    Eosinophil % 0.1 0.0 - 8.0 %    Basophil % 0.8 0.0 - 1.9 %    Differential Method Automated    Comprehensive metabolic panel   Result Value Ref Range    Sodium 147 (H) 136 - 145 mmol/L    Potassium 5.0 3.5 - 5.1 mmol/L    Chloride 91 (L) 95 - 110 mmol/L    CO2 11 (L) 22 - 31 mmol/L    Glucose 187 (H) 70 - 110 mg/dL    BUN 21 (H) 7 - 18 mg/dL    Creatinine 6.16 (H) 0.50 - 1.40 mg/dL    Calcium 11.7 (H) 8.4 - 10.2 mg/dL    Total Protein 9.9 (H) 6.0 - 8.4 g/dL    Albumin 5.5 (H) 3.5 - 5.2 g/dL    Total Bilirubin 2.9 (H) 0.2 - 1.3 mg/dL    Alkaline Phosphatase 217 (H) 38 - 145 U/L    AST 63 (H) 14 - 36 U/L    ALT 47 (H) 0 - 35 U/L    Anion Gap 45 (H) 8 - 16 mmol/L    eGFR 8 (A) >60 mL/min/1.73 m^2   Lipase   Result Value Ref Range    Lipase Result 118 23 - 300 U/L   Magnesium   Result Value Ref Range    Magnesium 2.2 1.6 - 2.6 mg/dL   Lactic acid, plasma   Result Value Ref Range    Lactate (Lactic Acid) 20.6 (HH) 0.5 - 2.2 mmol/L   COVID-19 Rapid Screening   Result Value Ref Range    SARS-CoV-2 RNA, Amplification, Qual Negative Negative   Lactic acid, plasma   Result Value Ref Range    Lactate (Lactic Acid) 18.3 (HH) 0.5 - 2.2 mmol/L    Troponin I   Result Value Ref Range    Troponin I 0.172 (HH) 0.012 - 0.034 ng/mL   Beta-Hydroxybutyrate, Serum   Result Value Ref Range    Beta-Hydroxybutyrate 8.62 (H) 0.21 - 2.81 mg/dL   CK   Result Value Ref Range     55 - 170 U/L   Troponin I   Result Value Ref Range    Troponin I 0.063 (H) 0.012 - 0.034 ng/mL   Ethanol   Result Value Ref Range    Alcohol, Serum <10 <10 mg/dL   Acetaminophen Level   Result Value Ref Range    Acetaminophen (Tylenol), Serum <10.0 10.0 - 20.0 ug/mL   Salicylate Level   Result Value Ref Range    Salicylate Lvl <1 0 - 19 mg/dL   Hepatitis B surface antibody   Result Value Ref Range    Hep B S Ab >1000.00 IU/L   Hepatitis B core antibody, total   Result Value Ref Range    Hepatitis B Core Ab Total Negative Negative   Hepatitis B surface antigen   Result Value Ref Range    Hepatitis B Surface Ag Negative    Hemoglobin A1c if not done in past 3 months   Result Value Ref Range    Hemoglobin A1C 7.5 (H) 0.0 - 5.6 %    Estimated Avg Glucose 169 (H) 68 - 131 mg/dL   CBC Auto Differential   Result Value Ref Range    WBC 10.20 3.90 - 12.70 K/uL    RBC 4.22 4.00 - 5.40 M/uL    Hemoglobin 13.0 12.0 - 16.0 g/dL    Hematocrit 39.2 37.0 - 48.5 %    MCV 93 82 - 98 fL    MCH 30.8 27.0 - 31.0 pg    MCHC 33.2 32.0 - 36.0 g/dL    RDW 17.7 (H) 11.5 - 14.5 %    Platelets 146 (L) 150 - 450 K/uL    MPV 11.7 9.2 - 12.9 fL    Immature Granulocytes 0.4 0.0 - 0.5 %    Gran # (ANC) 8.2 (H) 1.8 - 7.7 K/uL    Immature Grans (Abs) 0.04 0.00 - 0.04 K/uL    Lymph # 1.1 1.0 - 4.8 K/uL    Mono # 0.8 0.3 - 1.0 K/uL    Eos # 0.0 0.0 - 0.5 K/uL    Baso # 0.03 0.00 - 0.20 K/uL    nRBC 0 0 /100 WBC    Gran % 80.7 (H) 38.0 - 73.0 %    Lymph % 10.8 (L) 18.0 - 48.0 %    Mono % 7.8 4.0 - 15.0 %    Eosinophil % 0.0 0.0 - 8.0 %    Basophil % 0.3 0.0 - 1.9 %    Differential Method Automated    Comprehensive metabolic panel   Result Value Ref Range    Sodium 145 136 - 145 mmol/L    Potassium 4.4 3.5 - 5.1 mmol/L     Chloride 89 (L) 95 - 110 mmol/L    CO2 19 (L) 22 - 31 mmol/L    Glucose 136 (H) 70 - 110 mg/dL    BUN 20 (H) 7 - 18 mg/dL    Creatinine 4.60 (H) 0.50 - 1.40 mg/dL    Calcium 10.9 (H) 8.4 - 10.2 mg/dL    Total Protein 8.5 (H) 6.0 - 8.4 g/dL    Albumin 5.0 3.5 - 5.2 g/dL    Total Bilirubin 2.2 (H) 0.2 - 1.3 mg/dL    Alkaline Phosphatase 138 38 - 145 U/L     (H) 14 - 36 U/L    ALT 93 (H) 0 - 35 U/L    Anion Gap 37 (H) 8 - 16 mmol/L    eGFR 11 (A) >60 mL/min/1.73 m^2   Magnesium   Result Value Ref Range    Magnesium 2.0 1.6 - 2.6 mg/dL   Phosphorus   Result Value Ref Range    Phosphorus 6.4 (H) 2.7 - 4.5 mg/dL   Vitamin D   Result Value Ref Range    Vit D, 25-Hydroxy 56 30 - 96 ng/mL   PTH, intact   Result Value Ref Range    PTH, Intact 850.8 (H) 9.0 - 77.0 pg/mL   Troponin I   Result Value Ref Range    Troponin I 0.200 (HH) 0.012 - 0.034 ng/mL   TSH   Result Value Ref Range    TSH 2.500 0.400 - 4.000 uIU/mL   Lipid panel   Result Value Ref Range    Cholesterol 150 120 - 199 mg/dL    Triglycerides 141 30 - 150 mg/dL    HDL 60 40 - 75 mg/dL    LDL Cholesterol 61.8 (L) 63.0 - 159.0 mg/dL    HDL/Cholesterol Ratio 40.0 20.0 - 50.0 %    Total Cholesterol/HDL Ratio 2.5 2.0 - 5.0    Non-HDL Cholesterol 90 mg/dL   Hepatitis Panel, Acute   Result Value Ref Range    Hepatitis B Surface Ag Negative     Hep B C IgM Negative     Hep A IgM Negative     Hepatitis C Ab Negative    Lactic acid, plasma   Result Value Ref Range    Lactate (Lactic Acid) 2.3 (H) 0.5 - 2.2 mmol/L   Beta - Hydroxybutyrate, Serum   Result Value Ref Range    Beta-Hydroxybutyrate 15.10 (H) 0.21 - 2.81 mg/dL   Basic metabolic panel   Result Value Ref Range    Sodium 138 136 - 145 mmol/L    Potassium 4.2 3.5 - 5.1 mmol/L    Chloride 90 (L) 95 - 110 mmol/L    CO2 27 22 - 31 mmol/L    Glucose 194 (H) 70 - 110 mg/dL    BUN 21 (H) 7 - 18 mg/dL    Creatinine 3.67 (H) 0.50 - 1.40 mg/dL    Calcium 10.0 8.4 - 10.2 mg/dL    Anion Gap 21 (H) 8 - 16 mmol/L    eGFR 15  (A) >60 mL/min/1.73 m^2   CK   Result Value Ref Range    CPK 70 55 - 170 U/L   CBC Auto Differential   Result Value Ref Range    WBC 9.37 3.90 - 12.70 K/uL    RBC 4.51 4.00 - 5.40 M/uL    Hemoglobin 13.9 12.0 - 16.0 g/dL    Hematocrit 41.2 37.0 - 48.5 %    MCV 91 82 - 98 fL    MCH 30.8 27.0 - 31.0 pg    MCHC 33.7 32.0 - 36.0 g/dL    RDW 18.2 (H) 11.5 - 14.5 %    Platelets 193 150 - 450 K/uL    MPV 11.9 9.2 - 12.9 fL    Immature Granulocytes 0.6 (H) 0.0 - 0.5 %    Gran # (ANC) 7.1 1.8 - 7.7 K/uL    Immature Grans (Abs) 0.06 (H) 0.00 - 0.04 K/uL    Lymph # 1.2 1.0 - 4.8 K/uL    Mono # 0.9 0.3 - 1.0 K/uL    Eos # 0.1 0.0 - 0.5 K/uL    Baso # 0.05 0.00 - 0.20 K/uL    nRBC 2 (A) 0 /100 WBC    Gran % 76.3 (H) 38.0 - 73.0 %    Lymph % 12.9 (L) 18.0 - 48.0 %    Mono % 9.1 4.0 - 15.0 %    Eosinophil % 0.6 0.0 - 8.0 %    Basophil % 0.5 0.0 - 1.9 %    Differential Method Automated    Comprehensive Metabolic Panel   Result Value Ref Range    Sodium 137 136 - 145 mmol/L    Potassium 3.6 3.5 - 5.1 mmol/L    Chloride 85 (L) 95 - 110 mmol/L    CO2 31 22 - 31 mmol/L    Glucose 118 (H) 70 - 110 mg/dL    BUN 19 (H) 7 - 18 mg/dL    Creatinine 3.60 (H) 0.50 - 1.40 mg/dL    Calcium 10.2 8.4 - 10.2 mg/dL    Total Protein 8.4 6.0 - 8.4 g/dL    Albumin 4.5 3.5 - 5.2 g/dL    Total Bilirubin 2.2 (H) 0.2 - 1.3 mg/dL    Alkaline Phosphatase 126 38 - 145 U/L     (H) 14 - 36 U/L    ALT 93 (H) 0 - 35 U/L    Anion Gap 21 (H) 8 - 16 mmol/L    eGFR 15 (A) >60 mL/min/1.73 m^2   NT-Pro Natriuretic Peptide   Result Value Ref Range    NT-proBNP 248613 (H) 5 - 450 pg/mL   Lipase   Result Value Ref Range    Lipase Result 103 23 - 300 U/L   Amylase   Result Value Ref Range    Amylase 222 (H) 30 - 110 U/L   Beta-Hydroxybutyrate, Serum   Result Value Ref Range    Beta-Hydroxybutyrate 2.83 (H) 0.21 - 2.81 mg/dL   Basic metabolic panel   Result Value Ref Range    Sodium 131 (L) 136 - 145 mmol/L    Potassium 3.6 3.5 - 5.1 mmol/L    Chloride 85 (L) 95 -  110 mmol/L    CO2 28 22 - 31 mmol/L    Glucose 161 (H) 70 - 110 mg/dL    BUN 20 (H) 7 - 18 mg/dL    Creatinine 3.81 (H) 0.50 - 1.40 mg/dL    Calcium 9.5 8.4 - 10.2 mg/dL    Anion Gap 18 (H) 8 - 16 mmol/L    eGFR 14 (A) >60 mL/min/1.73 m^2   Magnesium   Result Value Ref Range    Magnesium 1.9 1.6 - 2.6 mg/dL   Phosphorus   Result Value Ref Range    Phosphorus 3.9 2.7 - 4.5 mg/dL   KRYSTLE IFA screen with reflex to titer and pattern   Result Value Ref Range    KRYSTLE IgG by IFA <1:80 <1:80    KRYSTLE Interpretive Comment See Note    Anti-DNA antibody, double-stranded   Result Value Ref Range    ds DNA Ab 36 (H) 0 - 24 IU   Cardiolipin antibody   Result Value Ref Range    Anticardiolipin IgG <10 <=14 GPL    Anticardiolipin IgM <10 <=12 MPL   C4 Complement   Result Value Ref Range    Complement (C-4) 26 11 - 44 mg/dL   C3 Complement   Result Value Ref Range    Complement (C-3) 62 50 - 180 mg/dL   Sedimentation rate   Result Value Ref Range    Sed Rate 19 0 - 19 mm/Hr   C-reactive protein   Result Value Ref Range    CRP 1.80 (H) 0.00 - 0.90 mg/dL   Cyclic citrul peptide antibody, IgG   Result Value Ref Range    CCP Antibodies <0.5 <5.0 U/mL   CK   Result Value Ref Range    CPK 33 (L) 55 - 170 U/L   Basic metabolic panel   Result Value Ref Range    Sodium 131 (L) 136 - 145 mmol/L    Potassium 4.1 3.5 - 5.1 mmol/L    Chloride 84 (L) 95 - 110 mmol/L    CO2 28 22 - 31 mmol/L    Glucose 164 (H) 70 - 110 mg/dL    BUN 20 (H) 7 - 18 mg/dL    Creatinine 4.03 (H) 0.50 - 1.40 mg/dL    Calcium 9.3 8.4 - 10.2 mg/dL    Anion Gap 19 (H) 8 - 16 mmol/L    eGFR 13 (A) >60 mL/min/1.73 m^2   Basic metabolic panel   Result Value Ref Range    Sodium 130 (L) 136 - 145 mmol/L    Potassium 4.3 3.5 - 5.1 mmol/L    Chloride 87 (L) 95 - 110 mmol/L    CO2 28 22 - 31 mmol/L    Glucose 163 (H) 70 - 110 mg/dL    BUN 21 (H) 7 - 18 mg/dL    Creatinine 4.21 (H) 0.50 - 1.40 mg/dL    Calcium 9.0 8.4 - 10.2 mg/dL    Anion Gap 15 8 - 16 mmol/L    eGFR 13 (A) >60  mL/min/1.73 m^2   Magnesium   Result Value Ref Range    Magnesium 1.9 1.6 - 2.6 mg/dL   Magnesium   Result Value Ref Range    Magnesium 1.8 1.6 - 2.6 mg/dL   Phosphorus   Result Value Ref Range    Phosphorus 3.5 2.7 - 4.5 mg/dL   Phosphorus   Result Value Ref Range    Phosphorus 3.4 2.7 - 4.5 mg/dL   Comprehensive Metabolic Panel   Result Value Ref Range    Sodium 130 (L) 136 - 145 mmol/L    Potassium 4.5 3.5 - 5.1 mmol/L    Chloride 85 (L) 95 - 110 mmol/L    CO2 27 22 - 31 mmol/L    Glucose 185 (H) 70 - 110 mg/dL    BUN 22 (H) 7 - 18 mg/dL    Creatinine 4.46 (H) 0.50 - 1.40 mg/dL    Calcium 8.9 8.4 - 10.2 mg/dL    Total Protein 7.3 6.0 - 8.4 g/dL    Albumin 3.7 3.5 - 5.2 g/dL    Total Bilirubin 1.9 (H) 0.2 - 1.3 mg/dL    Alkaline Phosphatase 105 38 - 145 U/L     (H) 14 - 36 U/L    ALT 76 (H) 0 - 35 U/L    Anion Gap 18 (H) 8 - 16 mmol/L    eGFR 12 (A) >60 mL/min/1.73 m^2   CBC Auto Differential   Result Value Ref Range    WBC 7.29 3.90 - 12.70 K/uL    RBC 4.16 4.00 - 5.40 M/uL    Hemoglobin 13.0 12.0 - 16.0 g/dL    Hematocrit 37.4 37.0 - 48.5 %    MCV 90 82 - 98 fL    MCH 31.3 (H) 27.0 - 31.0 pg    MCHC 34.8 32.0 - 36.0 g/dL    RDW 17.7 (H) 11.5 - 14.5 %    Platelets 187 150 - 450 K/uL    MPV 11.5 9.2 - 12.9 fL    Immature Granulocytes 0.5 0.0 - 0.5 %    Gran # (ANC) 5.2 1.8 - 7.7 K/uL    Immature Grans (Abs) 0.04 0.00 - 0.04 K/uL    Lymph # 1.0 1.0 - 4.8 K/uL    Mono # 0.8 0.3 - 1.0 K/uL    Eos # 0.1 0.0 - 0.5 K/uL    Baso # 0.07 0.00 - 0.20 K/uL    nRBC 1 (A) 0 /100 WBC    Gran % 71.2 38.0 - 73.0 %    Lymph % 14.0 (L) 18.0 - 48.0 %    Mono % 11.5 4.0 - 15.0 %    Eosinophil % 1.8 0.0 - 8.0 %    Basophil % 1.0 0.0 - 1.9 %    Differential Method Automated    Basic metabolic panel   Result Value Ref Range    Sodium 129 (L) 136 - 145 mmol/L    Potassium 4.4 3.5 - 5.1 mmol/L    Chloride 86 (L) 95 - 110 mmol/L    CO2 28 22 - 31 mmol/L    Glucose 180 (H) 70 - 110 mg/dL    BUN 22 (H) 7 - 18 mg/dL    Creatinine 4.38  (H) 0.50 - 1.40 mg/dL    Calcium 9.1 8.4 - 10.2 mg/dL    Anion Gap 15 8 - 16 mmol/L    eGFR 12 (A) >60 mL/min/1.73 m^2   Magnesium   Result Value Ref Range    Magnesium 1.9 1.6 - 2.6 mg/dL   Magnesium   Result Value Ref Range    Magnesium 1.9 1.6 - 2.6 mg/dL   Phosphorus   Result Value Ref Range    Phosphorus 3.2 2.7 - 4.5 mg/dL   Phosphorus   Result Value Ref Range    Phosphorus 3.1 2.7 - 4.5 mg/dL   Basic metabolic panel   Result Value Ref Range    Sodium 130 (L) 136 - 145 mmol/L    Potassium 5.0 3.5 - 5.1 mmol/L    Chloride 85 (L) 95 - 110 mmol/L    CO2 27 22 - 31 mmol/L    Glucose 171 (H) 70 - 110 mg/dL    BUN 24 (H) 7 - 18 mg/dL    Creatinine 4.64 (H) 0.50 - 1.40 mg/dL    Calcium 8.9 8.4 - 10.2 mg/dL    Anion Gap 18 (H) 8 - 16 mmol/L    eGFR 11 (A) >60 mL/min/1.73 m^2   Magnesium   Result Value Ref Range    Magnesium 1.9 1.6 - 2.6 mg/dL   Phosphorus   Result Value Ref Range    Phosphorus 3.3 2.7 - 4.5 mg/dL   Osmolality, Serum   Result Value Ref Range    Osmolality 283 275 - 295 mOsm/kg   Basic metabolic panel   Result Value Ref Range    Sodium 129 (L) 136 - 145 mmol/L    Potassium 5.1 3.5 - 5.1 mmol/L    Chloride 86 (L) 95 - 110 mmol/L    CO2 23 22 - 31 mmol/L    Glucose 152 (H) 70 - 110 mg/dL    BUN 24 (H) 7 - 18 mg/dL    Creatinine 4.60 (H) 0.50 - 1.40 mg/dL    Calcium 9.1 8.4 - 10.2 mg/dL    Anion Gap 20 (H) 8 - 16 mmol/L    eGFR 11 (A) >60 mL/min/1.73 m^2   Magnesium   Result Value Ref Range    Magnesium 2.0 1.6 - 2.6 mg/dL   Phosphorus   Result Value Ref Range    Phosphorus 3.5 2.7 - 4.5 mg/dL   CBC Auto Differential   Result Value Ref Range    WBC 5.73 3.90 - 12.70 K/uL    RBC 3.77 (L) 4.00 - 5.40 M/uL    Hemoglobin 11.7 (L) 12.0 - 16.0 g/dL    Hematocrit 34.1 (L) 37.0 - 48.5 %    MCV 91 82 - 98 fL    MCH 31.0 27.0 - 31.0 pg    MCHC 34.3 32.0 - 36.0 g/dL    RDW 17.8 (H) 11.5 - 14.5 %    Platelets 158 150 - 450 K/uL    MPV 11.6 9.2 - 12.9 fL    Immature Granulocytes 0.9 (H) 0.0 - 0.5 %    Gran # (ANC) 3.9  1.8 - 7.7 K/uL    Immature Grans (Abs) 0.05 (H) 0.00 - 0.04 K/uL    Lymph # 0.7 (L) 1.0 - 4.8 K/uL    Mono # 0.7 0.3 - 1.0 K/uL    Eos # 0.2 0.0 - 0.5 K/uL    Baso # 0.05 0.00 - 0.20 K/uL    nRBC 1 (A) 0 /100 WBC    Gran % 68.9 38.0 - 73.0 %    Lymph % 12.6 (L) 18.0 - 48.0 %    Mono % 12.9 4.0 - 15.0 %    Eosinophil % 3.8 0.0 - 8.0 %    Basophil % 0.9 0.0 - 1.9 %    Differential Method Automated    Comprehensive Metabolic Panel   Result Value Ref Range    Sodium 126 (L) 136 - 145 mmol/L    Potassium 4.5 3.5 - 5.1 mmol/L    Chloride 84 (L) 95 - 110 mmol/L    CO2 27 22 - 31 mmol/L    Glucose 165 (H) 70 - 110 mg/dL    BUN 33 (H) 7 - 18 mg/dL    Creatinine 5.41 (H) 0.50 - 1.40 mg/dL    Calcium 8.3 (L) 8.4 - 10.2 mg/dL    Total Protein 6.8 6.0 - 8.4 g/dL    Albumin 3.6 3.5 - 5.2 g/dL    Total Bilirubin 1.5 (H) 0.2 - 1.3 mg/dL    Alkaline Phosphatase 158 (H) 38 - 145 U/L    AST 80 (H) 14 - 36 U/L    ALT 59 (H) 0 - 35 U/L    Anion Gap 15 8 - 16 mmol/L    eGFR 9 (A) >60 mL/min/1.73 m^2   Magnesium   Result Value Ref Range    Magnesium 1.8 1.6 - 2.6 mg/dL   Phosphorus   Result Value Ref Range    Phosphorus 2.7 2.7 - 4.5 mg/dL   Basic metabolic panel   Result Value Ref Range    Sodium 126 (L) 136 - 145 mmol/L    Potassium 4.5 3.5 - 5.1 mmol/L    Chloride 84 (L) 95 - 110 mmol/L    CO2 27 22 - 31 mmol/L    Glucose 165 (H) 70 - 110 mg/dL    BUN 33 (H) 7 - 18 mg/dL    Creatinine 5.41 (H) 0.50 - 1.40 mg/dL    Calcium 8.3 (L) 8.4 - 10.2 mg/dL    Anion Gap 15 8 - 16 mmol/L    eGFR 9 (A) >60 mL/min/1.73 m^2   Hemoglobin   Result Value Ref Range    Hemoglobin 11.6 (L) 12.0 - 16.0 g/dL   Magnesium   Result Value Ref Range    Magnesium 2.0 1.6 - 2.6 mg/dL   Phosphorus   Result Value Ref Range    Phosphorus 2.5 (L) 2.7 - 4.5 mg/dL   Basic metabolic panel   Result Value Ref Range    Sodium 127 (L) 136 - 145 mmol/L    Potassium 4.6 3.5 - 5.1 mmol/L    Chloride 93 (L) 95 - 110 mmol/L    CO2 18 (L) 22 - 31 mmol/L    Glucose 128 (H) 70 - 110  mg/dL    BUN 25 (H) 7 - 18 mg/dL    Creatinine 4.14 (H) 0.50 - 1.40 mg/dL    Calcium 8.2 (L) 8.4 - 10.2 mg/dL    Anion Gap 16 8 - 16 mmol/L    eGFR 13 (A) >60 mL/min/1.73 m^2   POCT Arterial Blood Gas Once   Result Value Ref Range    POC PH 7.21 (LL) 7.35 - 7.45    POC PCO2 16 (LL) 35.0 - 45.0 mmHg    POC PO2 106 (H) 80.0 - 100.0 mmHg    POC THb 13.8 12.0 - 18.0 g/dL    POC O2Hb 95.6 94.0 - 100.0 %    POC COHb 1.3 <1.6 %    POC MetHb 0.6 <3.0 %    POC SATURATED O2 97.5 94.0 - 100.0 %    POC pH Temp 7.21 (LL) 7.35 - 7.45    POC pCO2 Temp CANCELED mmHg    POC pO2 Temp 106 (H) 80.0 - 100.0 mmHg    POC Temp 37.0 C    Mode #1 -     O2 Device #1 -     O2 Device #2 -     FiO2 21.0 %    Mech VT - mL    Mech Rate (BPM) - bpm    PiP - cm H2O    PEEP - cm H2O    Pressure Support - cm H2O    Pressure Control - cm H2O    Pulse Ox - %    IPAP - cm H2O    EPAP - cm H2O    Flow - LPM    Frequency - Hz    I Time -     Nitric - PPM    PAW - cmH2O    POC BE(B) -19.1 (L) -2.0 - 2.0 mmol/L    POC P/F Ratio 505 mmHg    POC pAO2 CANCELED mmHg    POC HCO3-(c) 6.4 (L) 22.0 - 26.0 mmol/L    POC A-aDO2 CANCELED mmHg    Date of Draw 20221104     Time of Draw 1715     Allens Test POS     Analyzed by: DC     Drawn by: DC     Sample Site RR    POCT Arterial Blood Gas Once   Result Value Ref Range    POC PH 7.46 (H) 7.35 - 7.45    POC PCO2 36 35.0 - 45.0 mmHg    POC PO2 174 (H) 80.0 - 100.0 mmHg    POC THb 12.6 12.0 - 18.0 g/dL    POC O2Hb 97.6 94.0 - 100.0 %    POC COHb 1.2 <1.6 %    POC MetHb 0.8 <3.0 %    POC SATURATED O2 99.6 94.0 - 100.0 %    POC pH Temp 7.46 (H) 7.35 - 7.45    POC pCO2 Temp 36 35.0 - 45.0 mmHg    POC pO2 Temp 174 (H) 80.0 - 100.0 mmHg    POC Temp 37.0 C    Mode #1 -     O2 Device #1 OxyMask     O2 Device #2 -     FiO2 32.0 %    Mech VT - mL    Mech Rate (BPM) - bpm    PiP - cm H2O    PEEP - cm H2O    Pressure Support - cm H2O    Pressure Control - cm H2O    Pulse Ox - %    IPAP - cm H2O    EPAP - cm H2O    Flow - LPM     Frequency - Hz    I Time -     Nitric - PPM    PAW - cmH2O    POC BE(B) 1.9 -2.0 - 2.0 mmol/L    POC P/F Ratio 544 mmHg    POC pAO2 183 mmHg    POC HCO3-(c) 25.6 22.0 - 26.0 mmol/L    POC A-aDO2 9 mmHg    Date of Draw 20221105     Time of Draw 1150     Allens Test POS     Analyzed by: DC     Drawn by: DC     Sample Site rr    Echo   Result Value Ref Range    BSA 1.87 m2    TDI SEPTAL 0.05 m/s    LV LATERAL E/E' RATIO 12.75 m/s    LV SEPTAL E/E' RATIO 10.20 m/s    LA WIDTH 2.78 cm    IVC diameter 1.91 cm    Left Ventricular Outflow Tract Mean Velocity 0.45 cm/s    Left Ventricular Outflow Tract Mean Gradient 1.00 mmHg    TDI LATERAL 0.04 m/s    PV PEAK VELOCITY 0.69 cm/s    LVIDd 3.87 3.5 - 6.0 cm    IVS 1.37 (A) 0.6 - 1.1 cm    Posterior Wall 1.29 (A) 0.6 - 1.1 cm    LVIDs 3.35 2.1 - 4.0 cm    FS 13 28 - 44 %    LA volume 42.42 cm3    STJ 2.52 cm    Ascending aorta 2.60 cm    LV mass 184.04 g    LA size 3.40 cm    Left Ventricle Relative Wall Thickness 0.67 cm    AV mean gradient 2 mmHg    AV valve area 3.12 cm2    AV Velocity Ratio 0.66     AV index (prosthetic) 0.99     MV mean gradient 1 mmHg    MV valve area p 1/2 method 2.47 cm2    MV valve area by continuity eq 1.98 cm2    E/A ratio 1.28     Mean e' 0.05 m/s    E wave deceleration time 254.00 msec    LVOT diameter 2.00 cm    LVOT area 3.1 cm2    LVOT peak surinder 0.74 m/s    LVOT peak VTI 13.50 cm    Ao peak surinder 1.12 m/s    Ao VTI 13.6 cm    LVOT stroke volume 42.39 cm3    AV peak gradient 5 mmHg    MV peak gradient 2 mmHg    E/E' ratio 11.33 m/s    MV Peak E Surinder 0.51 m/s    TR Max Surinder 3.62 m/s    MV VTI 21.4 cm    MV stenosis pressure 1/2 time 89.00 ms    MV Peak A Surinder 0.40 m/s    LV Systolic Volume 45.80 mL    LV Systolic Volume Index 24.9 mL/m2    LV Diastolic Volume 64.70 mL    LV Diastolic Volume Index 35.16 mL/m2    LA Volume Index 23.1 mL/m2    LV Mass Index 100 g/m2    RA Major Axis 5.38 cm    Left Atrium Minor Axis 5.13 cm    Left Atrium Major Axis 5.44  cm    Triscuspid Valve Regurgitation Peak Gradient 52 mmHg    RA Width 4.78 cm    Right Atrial Pressure (from IVC) 15 mmHg    EF 50 %    TV rest pulmonary artery pressure 67 mmHg   POCT glucose   Result Value Ref Range    POCT Glucose 151 (H) 70 - 110 mg/dL   POCT glucose   Result Value Ref Range    POCT Glucose 169 (H) 70 - 110 mg/dL   POCT glucose   Result Value Ref Range    POCT Glucose 135 (H) 70 - 110 mg/dL   POCT glucose   Result Value Ref Range    POCT Glucose 157 (H) 70 - 110 mg/dL   POCT glucose   Result Value Ref Range    POCT Glucose 169 (H) 70 - 110 mg/dL   POCT glucose   Result Value Ref Range    POCT Glucose 186 (H) 70 - 110 mg/dL   POCT glucose   Result Value Ref Range    POCT Glucose 195 (H) 70 - 110 mg/dL   POCT glucose   Result Value Ref Range    POCT Glucose 120 (H) 70 - 110 mg/dL   POCT glucose   Result Value Ref Range    POCT Glucose 99 70 - 110 mg/dL   POCT glucose   Result Value Ref Range    POCT Glucose 112 (H) 70 - 110 mg/dL   POCT glucose   Result Value Ref Range    POCT Glucose 118 (H) 70 - 110 mg/dL   POCT glucose   Result Value Ref Range    POCT Glucose 111 (H) 70 - 110 mg/dL   POCT glucose   Result Value Ref Range    POCT Glucose 141 (H) 70 - 110 mg/dL   POCT glucose   Result Value Ref Range    POCT Glucose 106 70 - 110 mg/dL   POCT glucose   Result Value Ref Range    POCT Glucose 144 (H) 70 - 110 mg/dL   POCT glucose   Result Value Ref Range    POCT Glucose 167 (H) 70 - 110 mg/dL   POCT glucose   Result Value Ref Range    POCT Glucose 155 (H) 70 - 110 mg/dL   POCT glucose   Result Value Ref Range    POCT Glucose 153 (H) 70 - 110 mg/dL   POCT glucose   Result Value Ref Range    POCT Glucose 158 (H) 70 - 110 mg/dL   POCT glucose   Result Value Ref Range    POCT Glucose 167 (H) 70 - 110 mg/dL   POCT glucose   Result Value Ref Range    POCT Glucose 161 (H) 70 - 110 mg/dL   POCT glucose   Result Value Ref Range    POCT Glucose 179 (H) 70 - 110 mg/dL   POCT glucose   Result Value Ref Range     POCT Glucose 168 (H) 70 - 110 mg/dL   POCT glucose   Result Value Ref Range    POCT Glucose 153 (H) 70 - 110 mg/dL   POCT glucose   Result Value Ref Range    POCT Glucose 202 (H) 70 - 110 mg/dL   POCT glucose   Result Value Ref Range    POCT Glucose 152 (H) 70 - 110 mg/dL   POCT glucose   Result Value Ref Range    POCT Glucose 164 (H) 70 - 110 mg/dL   POCT glucose   Result Value Ref Range    POCT Glucose 196 (H) 70 - 110 mg/dL   POCT glucose   Result Value Ref Range    POCT Glucose 171 (H) 70 - 110 mg/dL   POCT glucose   Result Value Ref Range    POCT Glucose 172 (H) 70 - 110 mg/dL   POCT glucose   Result Value Ref Range    POCT Glucose 109 70 - 110 mg/dL   POCT glucose   Result Value Ref Range    POCT Glucose 100 70 - 110 mg/dL   POCT glucose   Result Value Ref Range    POCT Glucose 178 (H) 70 - 110 mg/dL   POCT glucose   Result Value Ref Range    POCT Glucose 119 (H) 70 - 110 mg/dL   POCT glucose   Result Value Ref Range    POCT Glucose 91 70 - 110 mg/dL   POCT glucose   Result Value Ref Range    POCT Glucose 202 (H) 70 - 110 mg/dL        Significant Imaging: Echo  · The left ventricle is normal in size with moderate concentric   hypertrophy and low normal systolic function.  · There is mild left ventricular global hypokinesis.  · The estimated ejection fraction is 50+/-5%.  · Mild right ventricular enlargement with mildly reduced right ventricular   systolic function.  · Severe tricuspid regurgitation.  · Mild mitral regurgitation.  · Elevated central venous pressure (15 mmHg).  · The estimated PA systolic pressure is 67 mmHg.  · There is moderate to severe pulmonary hypertension.         Assessment/Plan:     * Complication of AV dialysis fistula  Keep npo  Vascular consulted on case  Possible fistulogram       Type 2 diabetes mellitus  Patient's FSGs are controlled on current medication regimen.  Last A1c reviewed-   Lab Results   Component Value Date    HGBA1C 7.5 (H) 11/05/2022     Most recent fingerstick  glucose reviewed-   Recent Labs   Lab 11/09/22  1104 11/09/22  1640 11/09/22  2107   POCTGLUCOSE 119* 91 202*     Current correctional scale  Low  Maintain anti-hyperglycemic dose as follows-   Antihyperglycemics (From admission, onward)    Start     Stop Route Frequency Ordered    11/10/22 0109  insulin aspart U-100 pen 0-5 Units         -- SubQ Every 6 hours PRN 11/10/22 0010        Hold Oral hypoglycemics while patient is in the hospital.    Gastritis  Cont protonix daily      Gastroparesis  reglan tid      ESRD (end stage renal disease)  nephro consult for dialysis       Anemia in CKD (chronic kidney disease)  H/h stable   cont to monitor       VTE Risk Mitigation (From admission, onward)         Ordered     Place sequential compression device  Until discontinued         11/10/22 3052                   Audi Lucia MD  Department of Hospital Medicine   [facility] - Telemetry ([facility])

## 2022-11-10 NOTE — HOSPITAL COURSE
Patient was admitted for complication of AV dialysis fistula under the care of [facility] medicine. Patient had fistulogram today - will have HD in AM. BP is stable. Will restart liquid diet and advance as tolerated. Will need PO protonix 40mg BID X 2 months and PO sucralfate QID X 2 weeks. Discussed with GI (Avinash) who recommends hold reglan for now as EDG showed no food in stomach. If tolerates diet, will likely dc tomorrow.    11/11 POD #1, s/p FISTULOGRAM With 8 x 100 mm balloon angioplasty of 90% distal anastomotic stricture. Site edematous with ecchymosis this am, no evidence of active bleeding. HD stable. Per nephrology hold HD today, will reassess in am. If no improvement in site will consult IR in am for placement of vas cath.     11/12 POD #2 edema to AVF site has improved, ok for HD on tomorrow per vascular. K 5.9 give lisbeth, f/u CMP in am     11/13 patient s/p HD, tolerated well,  VSS. Recommend decrease losartan to 25 mg daily. Stable for discharge, resume normal HD schedule, f/u with pcp within 1 week. Carafate and PPI sent to pharmacy.

## 2022-11-10 NOTE — PROGRESS NOTES
[facility] - Telemetry ([facility])  [facility]  Progress Note    Patient Name: Gilda Schroeder  MRN: [id number]  Patient Class: IP- Inpatient   Admission Date: 11/9/2022  Length of Stay: 1 days  Attending Physician: Nina Field, *  Primary Care Provider: Roselia Rose MD        Subjective:     Principal Problem:Complication of AV dialysis fistula        HPI:  Patient is a 47-year-old aa female with past medical history significant for hypertension, type 2 diabetes mellitus, CAD status post cardiac stenting and CABG, chronic diastolic CHF, paroxysmal AFib status post cardioversion, on Eliquis, ESRD on hemodialysis every Tuesday, Thursday and Saturday, gastritis, diabetic gastroparesis, who presented as a transfer from [facility] for evaluation of bleeding AV fistula. Patient was recently admitted for abdominal pain along with nausea and vomiting. CT abd and pelvis, CTA, and MRCP were unremarkable. She was treated for dka. GI consulted on case and EGD performed on 11/9 showed gastritis. Nephro consulted on case for dialysis. AV fistula started bleeding on 11/8. Pressure was applied however fistula continued to bleed. A small stitch was placed. Nephrology then recommended transfer for fistulogram and vascular surgery consult. Case was discussed with Dr. Nielson and patient was transferred to [facility]. AV fistula currently not bleeding and patient is without any complaints.       Overview/Hospital Course:  Patient was admitted for complication of AV dialysis fistula under the care of [facility] medicine.       Interval History: Patient had fistulogram today - will have HD in AM. BP is stable. Will restart liquid diet and advance as tolerated. Will need PO protonix 40mg BID X 2 months and PO sucralfate QID X 2 weeks. Discussed with GI (Avinash) who recommends hold reglan for now as EDG showed no food in stomach. If tolerates diet, will likely dc tomorrow.    Review of Systems    Constitutional:  Negative for activity change, chills, fatigue and fever.   HENT:  Negative for congestion, rhinorrhea and sore throat.    Eyes:  Negative for visual disturbance.   Respiratory:  Negative for shortness of breath.    Cardiovascular:  Negative for chest pain, palpitations and leg swelling.   Gastrointestinal:  Negative for abdominal distention, abdominal pain, constipation, diarrhea, nausea and vomiting.   Genitourinary:         Patient does not make urine   Musculoskeletal:  Negative for arthralgias, back pain, gait problem and myalgias.   Skin:  Negative for color change and wound.   Allergic/Immunologic: Negative.    Neurological:  Negative for dizziness, seizures, speech difficulty and headaches.   Hematological: Negative.    Psychiatric/Behavioral:  Negative for agitation, behavioral problems and confusion. The patient is not nervous/anxious.    Objective:     Vital Signs (Most Recent):  Temp: 97.3 °F (36.3 °C) (11/10/22 1530)  Pulse: 62 (11/10/22 1600)  Resp: 12 (11/10/22 1600)  BP: (!) 103/53 (11/10/22 1600)  SpO2: 99 % (11/10/22 1600)   Vital Signs (24h Range):  Temp:  [97.3 °F (36.3 °C)-98.2 °F (36.8 °C)] 97.3 °F (36.3 °C)  Pulse:  [62-75] 62  Resp:  [12-19] 12  SpO2:  [96 %-99 %] 99 %  BP: ()/(47-85) 103/53     Weight: 77.6 kg (171 lb 1.2 oz)  Body mass index is 28.47 kg/m².    Intake/Output Summary (Last 24 hours) at 11/10/2022 1619  Last data filed at 11/10/2022 0900  Gross per 24 hour   Intake 0 ml   Output --   Net 0 ml      Physical Exam  Vitals and nursing note reviewed.   Constitutional:       General: She is not in acute distress.     Appearance: She is well-developed. She is not diaphoretic.   HENT:      Head: Normocephalic and atraumatic.      Nose: Nose normal.   Eyes:      General: No scleral icterus.     Extraocular Movements: Extraocular movements intact.      Pupils: Pupils are equal, round, and reactive to light.   Neck:      Thyroid: No thyromegaly.      Vascular: No  JVD.      Trachea: No tracheal deviation.   Cardiovascular:      Rate and Rhythm: Normal rate and regular rhythm.      Heart sounds: Normal heart sounds. No murmur heard.    No friction rub. No gallop.      Comments: LUE AV fistula with + thrill/bruit  Pulmonary:      Effort: Pulmonary effort is normal. No respiratory distress.      Breath sounds: Normal breath sounds. No wheezing or rales.   Chest:      Chest wall: No tenderness.   Abdominal:      General: Bowel sounds are normal. There is no distension.      Palpations: Abdomen is soft. There is no mass.      Tenderness: There is no abdominal tenderness.   Genitourinary:     Comments: deferred  Musculoskeletal:         General: No tenderness or deformity. Normal range of motion.      Cervical back: Normal range of motion and neck supple.   Skin:     General: Skin is warm and dry.      Capillary Refill: Capillary refill takes less than 2 seconds.      Coloration: Skin is not pale.      Findings: No erythema or rash.   Neurological:      Mental Status: She is alert and oriented to person, place, and time. Mental status is at baseline.      Cranial Nerves: No cranial nerve deficit.      Motor: No abnormal muscle tone.      Coordination: Coordination normal.   Psychiatric:         Mood and Affect: Mood normal.         Behavior: Behavior normal.       Significant Labs: All pertinent labs within the past 24 hours have been reviewed.  Recent Lab Results  (Last 5 results in the past 24 hours)        11/10/22  1204   11/10/22  0546   11/10/22  0459   11/09/22  2107   11/09/22  1640        Anion Gap     13           Baso #     0.08           Basophil %     1.5           BUN     34           Calcium     8.0           Chloride     93           CO2     21           Creatinine     5.5           Differential Method     Automated           eGFR     9           Eos #     0.2           Eosinophil %     3.6           Glucose     111           Gran # (ANC)     3.3           Gran %      62.2           Hematocrit     30.4           Hemoglobin     10.3           Immature Grans (Abs)     0.03  Comment: Mild elevation in immature granulocytes is non specific and   can be seen in a variety of conditions including stress response,   acute inflammation, trauma and pregnancy. Correlation with other   laboratory and clinical findings is essential.             Immature Granulocytes     0.6           Lymph #     0.9           Lymph %     17.2           MCH     30.8           MCHC     33.9           MCV     91           Mono #     0.8           Mono %     14.9           MPV     11.5           nRBC     0           Platelet Estimate     Decreased           Platelets     102           POCT Glucose 107   117     202   91       Potassium     4.7           RBC     3.34           RDW     18.1           Sodium     127           WBC     5.30                                  Significant Imaging: I have reviewed all pertinent imaging results/findings within the past 24 hours.      Assessment/Plan:      * Complication of AV dialysis fistula  Keep npo  Vascular consulted on case  Possible fistulogram   11/10:  --had fistulagram today  --patent - will f/u with Vascular in 2 weeks      Type 2 diabetes mellitus  Patient's FSGs are controlled on current medication regimen.  Last A1c reviewed-   Lab Results   Component Value Date    HGBA1C 7.5 (H) 11/05/2022     Most recent fingerstick glucose reviewed-   Recent Labs   Lab 11/09/22  1104 11/09/22  1640 11/09/22  2107   POCTGLUCOSE 119* 91 202*     Current correctional scale  Low  Maintain anti-hyperglycemic dose as follows-   Antihyperglycemics (From admission, onward)    Start     Stop Route Frequency Ordered    11/10/22 0109  insulin aspart U-100 pen 0-5 Units         -- SubQ Every 6 hours PRN 11/10/22 0010        Hold Oral hypoglycemics while patient is in the hospital.    Gastritis  --Cont protonix 40mg BID X 2 months  --Sucralfate qid X 2 weeks  --OP f/u with GI after  discharge      Gastroparesis  reglan tid      ESRD (end stage renal disease)  --nephrology following  --will have HD tomorrow AM      Anemia in CKD (chronic kidney disease)  H/h stable   cont to monitor       VTE Risk Mitigation (From admission, onward)         Ordered     Place sequential compression device  Until discontinued         11/10/22 1526     IP VTE HIGH RISK PATIENT  Once         11/10/22 1526     Place sequential compression device  Until discontinued         11/10/22 0503                Discharge Planning   MARK:      Code Status: Full Code   Is the patient medically ready for discharge?:     Reason for patient still in hospital (select all that apply): Patient trending condition, Treatment and Consult recommendations  Discharge Plan A: Home, Home with family                  PAWAN Callaway  Department of Hospital Medicine   [facility] - Telemetry ([facility])

## 2022-11-10 NOTE — ASSESSMENT & PLAN NOTE
Patient's FSGs are controlled on current medication regimen.  Last A1c reviewed-   Lab Results   Component Value Date    HGBA1C 7.5 (H) 11/05/2022     Most recent fingerstick glucose reviewed-   Recent Labs   Lab 11/09/22  1104 11/09/22  1640 11/09/22  2107   POCTGLUCOSE 119* 91 202*     Current correctional scale  Low  Maintain anti-hyperglycemic dose as follows-   Antihyperglycemics (From admission, onward)    Start     Stop Route Frequency Ordered    11/10/22 0109  insulin aspart U-100 pen 0-5 Units         -- SubQ Every 6 hours PRN 11/10/22 0010        Hold Oral hypoglycemics while patient is in the hospital.

## 2022-11-11 NOTE — ASSESSMENT & PLAN NOTE
--nephrology following  --will have HD tomorrow AM    11/11  POD #1, s/p FISTULOGRAM With 8 x 100 mm balloon angioplasty of 90% distal anastomotic stricture  Wrap LUE with ace to aid with  Swelling, monitor site   Hold HD today

## 2022-11-11 NOTE — PLAN OF CARE
Problem: Adult Inpatient Plan of Care  Goal: Plan of Care Review  Outcome: Ongoing, Progressing  Goal: Patient-Specific Goal (Individualized)  Outcome: Ongoing, Progressing  Goal: Absence of Hospital-Acquired Illness or Injury  Outcome: Ongoing, Progressing  Goal: Optimal Comfort and Wellbeing  Outcome: Ongoing, Progressing  Goal: Readiness for Transition of Care  Outcome: Ongoing, Progressing     Problem: Diabetes Comorbidity  Goal: Blood Glucose Level Within Targeted Range  Outcome: Ongoing, Progressing     Problem: Fluid and Electrolyte Imbalance (Acute Kidney Injury/Impairment)  Goal: Fluid and Electrolyte Balance  Outcome: Ongoing, Progressing     Problem: Oral Intake Inadequate (Acute Kidney Injury/Impairment)  Goal: Optimal Nutrition Intake  Outcome: Ongoing, Progressing

## 2022-11-11 NOTE — PROGRESS NOTES
Progress Note  Nephrology    Admit Date: 11/9/2022   LOS: 2 days     SUBJECTIVE:     Follow-up For: ESRD    Interval History:S/P AVF angioplasty yesterday,developed swelling  Near the site,vascular evaluated & wanted to rest the arm.Pt denies  SOB,swelling of legs or chest pain.    Active Ambulatory Problems     Diagnosis Date Noted    Anemia in CKD (chronic kidney disease) 07/05/2016    ESRD (end stage renal disease) 08/18/2017    Patient on waiting list for kidney transplant 08/24/2017    NSTEMI (non-ST elevated myocardial infarction) 12/10/2017    Gastroesophageal reflux disease without esophagitis 01/10/2018    Abdominal pain with vomiting 01/11/2018    Coronary artery disease involving native coronary artery of native heart 01/16/2018    ESRD (end stage renal disease) on dialysis 10/13/2018    Gastroparesis 01/17/2018    CAD (coronary artery disease)     Gastritis     Acute respiratory failure with hypoxia 06/09/2019    Hyperkalemia 06/09/2019    Hyperlipidemia 06/19/2019    Chest pain 11/08/2019    Pseudoaneurysm of right femoral artery 11/10/2019    ESRD on hemodialysis     Diabetic gastroparesis 01/06/2016    DM (diabetes mellitus), type 2, uncontrolled 03/20/2014    Type II or unspecified type diabetes mellitus with ophthalmic manifestations, not stated as uncontrolled(250.50) 10/01/2014    Diabetic retinopathy 10/01/2014    Non-ST elevation MI (NSTEMI) 11/20/2019    ACS (acute coronary syndrome)     Contamination of blood culture 07/01/2020    Stented coronary artery 11/11/2020    Acute on chronic diastolic CHF (congestive heart failure) 11/12/2020    S/P CABG x 5 11/16/2020    PAF (paroxysmal atrial fibrillation) 12/06/2020    Nausea and vomiting     Abnormal EKG 12/03/2019    Acute exacerbation of CHF (congestive heart failure) 12/02/2019    Disorder of bone, unspecified 01/18/2018    Dependence on renal dialysis 01/17/2018    Difficult intravenous access 12/03/2019    Uncontrolled hypertension  12/03/2019    Elevated LFTs 12/03/2019    Headache, unspecified 03/10/2018    History of anemia due to CKD 12/18/2019    Hypertensive chronic kidney disease with stage 5 chronic kidney disease or end stage renal disease 01/17/2018    Hypothyroidism, unspecified 10/28/2019    Other disorders of electrolyte and fluid balance, not elsewhere classified 01/17/2018    Other disorders of magnesium metabolism 01/17/2018    Hyperphosphatemia 12/18/2019    Other retention of urine 03/29/2018    Other specified coagulation defects 01/17/2018    Secondary hyperparathyroidism of renal origin 01/17/2018    Anemia in chronic kidney disease 01/17/2018    Hypertensive urgency 12/03/2019    Chronic atrial fibrillation, unspecified 01/17/2018    Acute kidney injury superimposed on CKD 12/03/2019    End stage renal disease 01/17/2018    ESRD needing dialysis 08/14/2018    Gastro-esophageal reflux disease without esophagitis 08/14/2019    Hyperlipidemia, unspecified 10/28/2019    Other obesity due to excess calories 08/14/2019    Non-ST elevation (NSTEMI) myocardial infarction 01/28/2019    Encounter for immunization 09/26/2018    Shortness of breath 12/02/2019    Type 2 diabetes mellitus 01/17/2018    Anemia 09/24/2021    Bradycardia 05/10/2022    Elevated troponin     DKA (diabetic ketoacidosis) 07/21/2022    ACP (advance care planning) 07/21/2022    Hypoxia 07/21/2022    Reactive depression 07/22/2022    Hypertensive emergency     Chronic diastolic heart failure 08/05/2022    Depression 08/05/2022    Mild major depression 09/07/2022    Metabolic acidosis 11/04/2022    Hyperbilirubinemia 11/06/2022    History of diabetic gastroparesis 11/06/2022    Chronic passive congestion of liver 11/06/2022    Tricuspid valve insufficiency 11/06/2022    Complication of AV dialysis fistula 11/09/2022     Resolved Ambulatory Problems     Diagnosis Date Noted    ESRD on peritoneal dialysis 07/05/2016    Organ transplant candidate 07/05/2016     Pre-transplant evaluation for chronic kidney disease 07/05/2016    Type 2 diabetes mellitus, with long-term current use of insulin 07/05/2016    Benign hypertension with ESRD (end-stage renal disease) 07/05/2016    Insulin long-term use 07/05/2016    Obesity 07/05/2016    BMI 35.0-35.9,adult 07/05/2016    Pilonidal cyst 09/21/2016    Peritoneal dialysis-associated peritonitis 08/18/2017    Unstable angina pectoris 12/12/2017    SOB (shortness of breath) 12/12/2017    Palpitations 01/10/2018    Sinus tachycardia 01/11/2018    Epigastric pain 08/31/2018    Hypertensive urgency 08/31/2018    Gastroparesis 08/31/2018    HTN (hypertension)     Diabetes mellitus     Lactic acidosis 10/15/2018    Transaminitis 01/15/2019    NSTEMI (non-ST elevated myocardial infarction) 01/16/2019    Chronic renal failure 03/11/2019    Volume overload 03/11/2019    Nausea 03/11/2019    Acute gastritis without hemorrhage 03/16/2019    Abnormal nuclear stress test     Hypertensive crisis 06/09/2019    Orthostatic hypotension 06/11/2019    Ischemic cardiomyopathy 10/17/2019    Pseudoaneurysm 11/11/2019    Proteinuria 07/02/2014    Hypoglycemia 06/30/2020    Hypothermia 07/01/2020    Elevated troponin level 11/11/2020    Coronary artery disease 11/16/2020    Hyponatremia 11/20/2020    Constipation 11/21/2020    Iron deficiency anemia, unspecified 01/17/2018    Mild protein-calorie malnutrition 01/17/2018    Poor historian 12/03/2019    Type 2 diabetes mellitus with hyperglycemia 12/03/2019    Fluid overload, unspecified 09/17/2019    Hypokalemia 09/24/2021    Tachycardia     Hypercalcemia 11/04/2022     Past Medical History:   Diagnosis Date    Allergy     Anticoagulant long-term use     Atrial fibrillation     Chronic diastolic CHF (congestive heart failure)     Coronary artery disease involving native coronary artery of native heart with unstable angina pectoris 11/16/2020    Diabetes mellitus with ESRD (end-stage renal disease) 07/05/2016     Disorder of kidney and ureter     History of heart artery stent 11/16/2020    Peritonitis associated with peritoneal dialysis 08/2017    Tricuspid regurgitation       OBJECTIVE:     Vital Signs (Most Recent)  Temp: 97.9 °F (36.6 °C) (11/11/22 1518)  Pulse: 70 (11/11/22 1518)  Resp: 18 (11/11/22 1525)  BP: (!) 115/56 (11/11/22 1518)  SpO2: 99 % (11/11/22 1518)    Vital Signs Range (Last 24H):  Temp:  [97.5 °F (36.4 °C)-99.1 °F (37.3 °C)]   Pulse:  [64-76]   Resp:  [16-18]   BP: (115-133)/(56-83)   SpO2:  [95 %-100 %]       Intake/Output Summary (Last 24 hours) at 11/11/2022 1656  Last data filed at 11/11/2022 1641  Gross per 24 hour   Intake 740 ml   Output --   Net 740 ml        Review of Systems   Constitutional:  Negative for malaise/fatigue.   HENT: Negative.     Eyes: Negative.    Respiratory: Negative.     Cardiovascular: Negative.    Gastrointestinal: Negative.    Genitourinary: Negative.    Musculoskeletal: Negative.    Skin:  Positive for rash.   Neurological: Negative.    Endo/Heme/Allergies: Negative.    Psychiatric/Behavioral: Negative.        Physical Exam  Constitutional:       General: She is not in acute distress.  HENT:      Head: Atraumatic.   Eyes:      Pupils: Pupils are equal, round, and reactive to light.   Neck:      Vascular: No JVD.   Cardiovascular:      Heart sounds: S1 normal and S2 normal.   Pulmonary:      Effort: Pulmonary effort is normal.   Abdominal:      General: Bowel sounds are normal.   Musculoskeletal:      Right lower leg: No edema.      Left lower leg: No edema.   Skin:     General: Skin is warm.      Findings: Bruising present.   Neurological:      Mental Status: She is alert and oriented to person, place, and time.   Psychiatric:         Behavior: Behavior normal.          Anesthesia/Surgery risks, benefits and alternative options discussed and understood by patient/family. ex      Current Facility-Administered Medications:     acetaminophen tablet 650 mg, 650 mg, Oral,  Q6H PRN, Tony Houston MD    buPROPion TBSR 12 hr tablet 100 mg, 100 mg, Oral, BID, Tony Houston MD, 100 mg at 11/11/22 0859    carvediloL tablet 6.25 mg, 6.25 mg, Oral, BID, Tony Houston MD, 6.25 mg at 11/11/22 0859    dextrose 10% bolus 125 mL, 12.5 g, Intravenous, PRN, Tony Houston MD    dextrose 10% bolus 250 mL, 25 g, Intravenous, PRN, Tony Houston MD    EScitalopram oxalate tablet 20 mg, 20 mg, Oral, QAM, Tony Houston MD, 20 mg at 11/11/22 0600    glucagon (human recombinant) injection 1 mg, 1 mg, Intramuscular, PRN, Tony Houston MD    hydrALAZINE injection 10 mg, 10 mg, Intravenous, Q6H PRN, Tony Houston MD    HYDROcodone-acetaminophen 5-325 mg per tablet 1 tablet, 1 tablet, Oral, Q4H PRN, Tony Houston MD, 1 tablet at 11/11/22 1525    influenza (QUADRIVALENT PF) vaccine 0.5 mL, 0.5 mL, Intramuscular, Prior to discharge, Tony Houston MD    insulin aspart U-100 pen 0-5 Units, 0-5 Units, Subcutaneous, Q6H PRN, Tony Houston MD, 2 Units at 11/11/22 0558    iohexoL (OMNIPAQUE 240) injection, , , PRN, Tony Houston MD, 5 mL at 11/10/22 1527    losartan tablet 100 mg, 100 mg, Oral, Daily, Tony Houston MD, 100 mg at 11/11/22 0859    metoclopramide HCl tablet 5 mg, 5 mg, Oral, Q6H, Tony Houston MD, 5 mg at 11/11/22 1158    mupirocin 2 % ointment, , Nasal, BID, Tony Houston MD, Given at 11/11/22 0900    ondansetron tablet 4 mg, 4 mg, Oral, Q6H PRN, Tony Houston MD    pantoprazole EC tablet 40 mg, 40 mg, Oral, BID, Daniela Garibay, TALAT-C, 40 mg at 11/11/22 0859    sucralfate 100 mg/mL suspension 1 g, 1 g, Oral, Q6H, Daniela Garibay, FNP-C, 1 g at 11/11/22 1158     Laboratory:  Recent Labs   Lab 11/07/22  1500 11/08/22  0447 11/09/22  0520 11/10/22  0459   * 126*  126* 127* 127*   K 5.1 4.5  4.5 4.6 4.7   CL 86* 84*  84* 93* 93*   CO2 23 27  27 18* 21*   BUN 24* 33*  33* 25* 34*   CREATININE 4.60* 5.41*  5.41* 4.14* 5.5*   CALCIUM 9.1 8.3*  8.3* 8.2* 8.0*    PHOS 3.5 2.7 2.5*  --      Recent Labs   Lab 11/08/22  0447 11/08/22  2015 11/10/22  0459 11/11/22  0451   WBC 5.73  --  5.30 5.07   HGB 11.7* 11.6* 10.3* 9.5*   HCT 34.1*  --  30.4* 28.2*     --  102* 74*        Diagnostic Results:      ASSESSMENT/PLAN:     ESRD:Would reasses in AM  for HD    Vascular acess :S/P angioplasty of the left arm  Has swelling    Hyponatremia:Na low,possibly for HD in AM     Anemia:Hb low S/P vascular procedure    HTN:BP acceptable    Above plan D/W Pt & Hospital medicine.        Thank you for allowing me to participate in the care of this patient.      Chantal Osborn MD  Nephrology

## 2022-11-11 NOTE — PROGRESS NOTES
[facility]  Vascular Surgery  Progress Note    Patient Name: Gilda Schroeder  MRN: [id number]  Admission Date: 11/9/2022  Primary Care Provider: Roselia Rose MD    Subjective:     Interval History: POD #1, s/p FISTULOGRAM With 8 x 100 mm balloon angioplasty of 90% distal anastomotic stricture     Ms. Schroeder is sitting up in the bed, in no acute distress.   Post-Op Info:  Procedure(s) (LRB):  FISTULOGRAM (N/A)   1 Day Post-Op      Medications:  Continuous Infusions:  Scheduled Meds:   buPROPion  100 mg Oral BID    carvediloL  6.25 mg Oral BID    EScitalopram oxalate  20 mg Oral QAM    losartan  100 mg Oral Daily    metoclopramide HCl  5 mg Oral Q6H    mupirocin   Nasal BID    pantoprazole  40 mg Oral BID    sucralfate  1 g Oral Q6H     PRN Meds:acetaminophen, dextrose 10%, dextrose 10%, glucagon (human recombinant), hydrALAZINE, HYDROcodone-acetaminophen, influenza, insulin aspart U-100, iohexoL, ondansetron     Objective:     Vital Signs (Most Recent):  Temp: 97.7 °F (36.5 °C) (11/11/22 0700)  Pulse: 70 (11/11/22 0900)  Resp: 18 (11/11/22 0900)  BP: 131/83 (11/11/22 0700)  SpO2: 99 % (11/11/22 0900) Vital Signs (24h Range):  Temp:  [97.3 °F (36.3 °C)-99.1 °F (37.3 °C)] 97.7 °F (36.5 °C)  Pulse:  [62-76] 70  Resp:  [12-18] 18  SpO2:  [95 %-100 %] 99 %  BP: ()/(47-83) 131/83         Physical Exam  Vitals reviewed.   Constitutional:       Appearance: Normal appearance. She is obese.   HENT:      Head: Normocephalic.   Cardiovascular:      Rate and Rhythm: Normal rate.      Pulses: Normal pulses.   Pulmonary:      Effort: Pulmonary effort is normal.   Musculoskeletal:         General: Normal range of motion.      Cervical back: Normal range of motion.   Skin:     General: Skin is warm and dry.      Findings: Bruising present.      Comments: Swelling noted to the LUE   Neurological:      Mental Status: She is alert and oriented to person, place, and time.   Psychiatric:         Mood  and Affect: Mood normal.         Behavior: Behavior normal.       Significant Labs:  CBC:   Recent Labs   Lab 11/11/22  0451   WBC 5.07   RBC 3.10*   HGB 9.5*   HCT 28.2*   PLT 74*   MCV 91   MCH 30.6   MCHC 33.7     CMP:   Recent Labs   Lab 11/10/22  0459   *   CALCIUM 8.0*   *   K 4.7   CO2 21*   CL 93*   BUN 34*   CREATININE 5.5*       Significant Diagnostics:      Assessment/Plan:     Active Diagnoses:    Diagnosis Date Noted POA    PRINCIPAL PROBLEM:  Complication of AV dialysis fistula [T82.9XXA] 11/09/2022 Yes    Gastritis [K29.70]  Yes    Gastroparesis [K31.84] 01/17/2018 Yes    Type 2 diabetes mellitus [E11.9] 01/17/2018 Yes    ESRD (end stage renal disease) [N18.6] 08/18/2017 Yes    Anemia in CKD (chronic kidney disease) [N18.9, D63.1] 07/05/2016 Yes      Problems Resolved During this Admission:     Left upper arm evaluated.  Patient states that the  arm is more swollen today than yesterday  S/p left upper arm access fistulogram  Patient with good pulse noted  Wrap left upper arm with ACE wrap - nursing order communicated  Will have IR place vas cath for HD today - let left upper arm access rest. Have discussed with primary care team and rounding vascular surgeon  Patient will need to be seen by vascular surgeon in [facility] in 2 weeks -(with Dr. Houston).    No further vascular intervention warranted  Jose Virk NP  Vascular Surgery  [facility] - Telemetry ([facility])

## 2022-11-11 NOTE — PLAN OF CARE
Problem: Adult Inpatient Plan of Care  Goal: Plan of Care Review  Outcome: Ongoing, Progressing  Goal: Patient-Specific Goal (Individualized)  Outcome: Ongoing, Progressing  Goal: Absence of Hospital-Acquired Illness or Injury  Outcome: Ongoing, Progressing  Goal: Optimal Comfort and Wellbeing  Outcome: Ongoing, Progressing  Goal: Readiness for Transition of Care  Outcome: Ongoing, Progressing     Problem: Diabetes Comorbidity  Goal: Blood Glucose Level Within Targeted Range  Outcome: Ongoing, Progressing     Problem: Oral Intake Inadequate (Acute Kidney Injury/Impairment)  Goal: Optimal Nutrition Intake  Outcome: Ongoing, Progressing     Problem: Renal Function Impairment (Acute Kidney Injury/Impairment)  Goal: Effective Renal Function  Outcome: Ongoing, Progressing

## 2022-11-11 NOTE — ASSESSMENT & PLAN NOTE
Keep npo  Vascular consulted on case  Possible fistulogram   11/10:  --had fistulagram today  --patent - will f/u with Vascular in 2 weeks    11/11  POD #1, s/p FISTULOGRAM With 8 x 100 mm balloon angioplasty of 90% distal anastomotic stricture  Hold HD today

## 2022-11-11 NOTE — PROGRESS NOTES
[facility] ([facility])  [facility] Medicine  Progress Note    Patient Name: Gilda Schroeder  MRN: [id number]  Patient Class: IP- Inpatient   Admission Date: 11/9/2022  Length of Stay: 2 days  Attending Physician: Nina Field, *  Primary Care Provider: Roselia Rose MD        Subjective:     Principal Problem:Complication of AV dialysis fistula        HPI:  Patient is a 47-year-old aa female with past medical history significant for hypertension, type 2 diabetes mellitus, CAD status post cardiac stenting and CABG, chronic diastolic CHF, paroxysmal AFib status post cardioversion, on Eliquis, ESRD on hemodialysis every Tuesday, Thursday and Saturday, gastritis, diabetic gastroparesis, who presented as a transfer from [facility] for evaluation of bleeding AV fistula. Patient was recently admitted for abdominal pain along with nausea and vomiting. CT abd and pelvis, CTA, and MRCP were unremarkable. She was treated for dka. GI consulted on case and EGD performed on 11/9 showed gastritis. Nephro consulted on case for dialysis. AV fistula started bleeding on 11/8. Pressure was applied however fistula continued to bleed. A small stitch was placed. Nephrology then recommended transfer for fistulogram and vascular surgery consult. Case was discussed with Dr. Nielson and patient was transferred to [facility]. AV fistula currently not bleeding and patient is without any complaints.       Overview/Hospital Course:  Patient was admitted for complication of AV dialysis fistula under the care of [facility] medicine. Patient had fistulogram today - will have HD in AM. BP is stable. Will restart liquid diet and advance as tolerated. Will need PO protonix 40mg BID X 2 months and PO sucralfate QID X 2 weeks. Discussed with GI (Avinash) who recommends hold reglan for now as EDG showed no food in stomach. If tolerates diet, will likely dc tomorrow.    11/11 POD #1, s/p FISTULOGRAM With 8 x 100 mm balloon  angioplasty of 90% distal anastomotic stricture. Site edematous with ecchymosis this am, no evidence of active bleeding. HD stable. Per nephrology hold HD today, will reassess in am. If no improvement in site will consult IR in am for placement of vas cath.       Interval History: Hold HD today    Review of Systems   Constitutional:  Negative for activity change, chills, fatigue and fever.   HENT:  Negative for congestion, rhinorrhea and sore throat.    Eyes:  Negative for visual disturbance.   Respiratory:  Negative for shortness of breath.    Cardiovascular:  Negative for chest pain, palpitations and leg swelling.   Gastrointestinal:  Negative for abdominal distention, abdominal pain, constipation, diarrhea, nausea and vomiting.   Genitourinary:         Patient does not make urine   Musculoskeletal:  Negative for arthralgias, back pain, gait problem and myalgias.   Skin:  Negative for color change and wound.   Allergic/Immunologic: Negative.    Neurological:  Negative for dizziness, seizures, speech difficulty and headaches.   Hematological: Negative.    Psychiatric/Behavioral:  Negative for agitation, behavioral problems and confusion. The patient is not nervous/anxious.    Objective:     Vital Signs (Most Recent):  Temp: 97.9 °F (36.6 °C) (11/11/22 1518)  Pulse: 70 (11/11/22 1518)  Resp: 18 (11/11/22 1525)  BP: (!) 115/56 (11/11/22 1518)  SpO2: 99 % (11/11/22 1518)   Vital Signs (24h Range):  Temp:  [97.5 °F (36.4 °C)-99.1 °F (37.3 °C)] 97.9 °F (36.6 °C)  Pulse:  [62-76] 70  Resp:  [12-18] 18  SpO2:  [95 %-100 %] 99 %  BP: (103-133)/(53-83) 115/56     Weight: 80.7 kg (177 lb 14.6 oz)  Body mass index is 29.61 kg/m².    Intake/Output Summary (Last 24 hours) at 11/11/2022 1556  Last data filed at 11/11/2022 1015  Gross per 24 hour   Intake 360 ml   Output --   Net 360 ml        Physical Exam  Vitals and nursing note reviewed.   Constitutional:       General: She is not in acute distress.     Appearance: She is  well-developed. She is not diaphoretic.   HENT:      Head: Normocephalic and atraumatic.      Nose: Nose normal.   Eyes:      General: No scleral icterus.     Extraocular Movements: Extraocular movements intact.      Pupils: Pupils are equal, round, and reactive to light.   Neck:      Thyroid: No thyromegaly.      Vascular: No JVD.      Trachea: No tracheal deviation.   Cardiovascular:      Rate and Rhythm: Normal rate and regular rhythm.      Heart sounds: Normal heart sounds. No murmur heard.    No friction rub. No gallop.      Comments: LUE AV fistula with + thrill/bruit  Pulmonary:      Effort: Pulmonary effort is normal. No respiratory distress.      Breath sounds: Normal breath sounds. No wheezing or rales.   Chest:      Chest wall: No tenderness.   Abdominal:      General: Bowel sounds are normal. There is no distension.      Palpations: Abdomen is soft. There is no mass.      Tenderness: There is no abdominal tenderness.   Genitourinary:     Comments: deferred  Musculoskeletal:         General: No tenderness or deformity. Normal range of motion.      Cervical back: Normal range of motion and neck supple.   Skin:     General: Skin is warm and dry.      Capillary Refill: Capillary refill takes less than 2 seconds.      Coloration: Skin is not pale.      Findings: No erythema or rash.   Neurological:      Mental Status: She is alert and oriented to person, place, and time. Mental status is at baseline.      Cranial Nerves: No cranial nerve deficit.      Motor: No abnormal muscle tone.      Coordination: Coordination normal.   Psychiatric:         Mood and Affect: Mood normal.         Behavior: Behavior normal.       Significant Labs: All pertinent labs within the past 24 hours have been reviewed.  Recent Lab Results  (Last 5 results in the past 24 hours)        11/11/22  1520   11/11/22  1143   11/11/22  0645   11/11/22  0530   11/11/22  0451        Baso #         0.06       Basophil %         1.2       BNP      1,422  Comment: Values of less than 100 pg/ml are consistent with non-CHF populations.           Differential Method         Automated       Eos #         0.2       Eosinophil %         3.0       Gran # (ANC)         3.4       Gran %         67.8       Hematocrit         28.2       Hemoglobin         9.5       Immature Grans (Abs)         0.04  Comment: Mild elevation in immature granulocytes is non specific and   can be seen in a variety of conditions including stress response,   acute inflammation, trauma and pregnancy. Correlation with other   laboratory and clinical findings is essential.         Immature Granulocytes         0.8       Lymph #         0.8       Lymph %         15.4       MCH         30.6       MCHC         33.7       MCV         91       Mono #         0.6       Mono %         11.8       MPV         11.9       nRBC         0       Platelets         74       POCT Glucose 172   184     230         RBC         3.10       RDW         17.9       WBC         5.07                              Significant Imaging: I have reviewed all pertinent imaging results/findings within the past 24 hours.      Assessment/Plan:      * Complication of AV dialysis fistula  Keep npo  Vascular consulted on case  Possible fistulogram   11/10:  --had fistulagram today  --patent - will f/u with Vascular in 2 weeks    11/11  POD #1, s/p FISTULOGRAM With 8 x 100 mm balloon angioplasty of 90% distal anastomotic stricture  Hold HD today          Type 2 diabetes mellitus  Patient's FSGs are controlled on current medication regimen.  Last A1c reviewed-   Lab Results   Component Value Date    HGBA1C 7.5 (H) 11/05/2022     Most recent fingerstick glucose reviewed-   Recent Labs   Lab 11/09/22  1104 11/09/22  1640 11/09/22  2107   POCTGLUCOSE 119* 91 202*     Current correctional scale  Low  Maintain anti-hyperglycemic dose as follows-   Antihyperglycemics (From admission, onward)    Start     Stop Route Frequency Ordered     11/10/22 0109  insulin aspart U-100 pen 0-5 Units         -- SubQ Every 6 hours PRN 11/10/22 0010        Hold Oral hypoglycemics while patient is in the hospital.    Gastritis  --Cont protonix 40mg BID X 2 months  --Sucralfate qid X 2 weeks  --OP f/u with GI after discharge      Gastroparesis  reglan tid      ESRD (end stage renal disease)  --nephrology following  --will have HD tomorrow AM    11/11  POD #1, s/p FISTULOGRAM With 8 x 100 mm balloon angioplasty of 90% distal anastomotic stricture  Wrap LUE with ace to aid with  Swelling, monitor site   Hold HD today          Anemia in CKD (chronic kidney disease)  H/h stable   cont to monitor         VTE Risk Mitigation (From admission, onward)         Ordered     Place sequential compression device  Until discontinued         11/10/22 1526     IP VTE HIGH RISK PATIENT  Once         11/10/22 1526     Place sequential compression device  Until discontinued         11/10/22 0503                Discharge Planning   MARK:  11/14/22    Code Status: Full Code   Is the patient medically ready for discharge?:     Reason for patient still in hospital (select all that apply): Patient trending condition, Treatment and Pending disposition  Discharge Plan A: Home, Home with family                  Alyssa Pickering NP  Department of Hospital Medicine   [facility] Tele[facility] ([facility])

## 2022-11-11 NOTE — SUBJECTIVE & OBJECTIVE
Interval History: Hold HD today    Review of Systems   Constitutional:  Negative for activity change, chills, fatigue and fever.   HENT:  Negative for congestion, rhinorrhea and sore throat.    Eyes:  Negative for visual disturbance.   Respiratory:  Negative for shortness of breath.    Cardiovascular:  Negative for chest pain, palpitations and leg swelling.   Gastrointestinal:  Negative for abdominal distention, abdominal pain, constipation, diarrhea, nausea and vomiting.   Genitourinary:         Patient does not make urine   Musculoskeletal:  Negative for arthralgias, back pain, gait problem and myalgias.   Skin:  Negative for color change and wound.   Allergic/Immunologic: Negative.    Neurological:  Negative for dizziness, seizures, speech difficulty and headaches.   Hematological: Negative.    Psychiatric/Behavioral:  Negative for agitation, behavioral problems and confusion. The patient is not nervous/anxious.    Objective:     Vital Signs (Most Recent):  Temp: 97.9 °F (36.6 °C) (11/11/22 1518)  Pulse: 70 (11/11/22 1518)  Resp: 18 (11/11/22 1525)  BP: (!) 115/56 (11/11/22 1518)  SpO2: 99 % (11/11/22 1518)   Vital Signs (24h Range):  Temp:  [97.5 °F (36.4 °C)-99.1 °F (37.3 °C)] 97.9 °F (36.6 °C)  Pulse:  [62-76] 70  Resp:  [12-18] 18  SpO2:  [95 %-100 %] 99 %  BP: (103-133)/(53-83) 115/56     Weight: 80.7 kg (177 lb 14.6 oz)  Body mass index is 29.61 kg/m².    Intake/Output Summary (Last 24 hours) at 11/11/2022 1559  Last data filed at 11/11/2022 1015  Gross per 24 hour   Intake 360 ml   Output --   Net 360 ml        Physical Exam  Vitals and nursing note reviewed.   Constitutional:       General: She is not in acute distress.     Appearance: She is well-developed. She is not diaphoretic.   HENT:      Head: Normocephalic and atraumatic.      Nose: Nose normal.   Eyes:      General: No scleral icterus.     Extraocular Movements: Extraocular movements intact.      Pupils: Pupils are equal, round, and reactive to  light.   Neck:      Thyroid: No thyromegaly.      Vascular: No JVD.      Trachea: No tracheal deviation.   Cardiovascular:      Rate and Rhythm: Normal rate and regular rhythm.      Heart sounds: Normal heart sounds. No murmur heard.    No friction rub. No gallop.      Comments: LUE AV fistula with + thrill/bruit  Pulmonary:      Effort: Pulmonary effort is normal. No respiratory distress.      Breath sounds: Normal breath sounds. No wheezing or rales.   Chest:      Chest wall: No tenderness.   Abdominal:      General: Bowel sounds are normal. There is no distension.      Palpations: Abdomen is soft. There is no mass.      Tenderness: There is no abdominal tenderness.   Genitourinary:     Comments: deferred  Musculoskeletal:         General: No tenderness or deformity. Normal range of motion.      Cervical back: Normal range of motion and neck supple.   Skin:     General: Skin is warm and dry.      Capillary Refill: Capillary refill takes less than 2 seconds.      Coloration: Skin is not pale.      Findings: No erythema or rash.   Neurological:      Mental Status: She is alert and oriented to person, place, and time. Mental status is at baseline.      Cranial Nerves: No cranial nerve deficit.      Motor: No abnormal muscle tone.      Coordination: Coordination normal.   Psychiatric:         Mood and Affect: Mood normal.         Behavior: Behavior normal.       Significant Labs: All pertinent labs within the past 24 hours have been reviewed.  Recent Lab Results  (Last 5 results in the past 24 hours)        11/11/22  1520   11/11/22  1143   11/11/22  0645   11/11/22  0530   11/11/22  0451        Baso #         0.06       Basophil %         1.2       BNP     1,422  Comment: Values of less than 100 pg/ml are consistent with non-CHF populations.           Differential Method         Automated       Eos #         0.2       Eosinophil %         3.0       Gran # (ANC)         3.4       Gran %         67.8       Hematocrit          28.2       Hemoglobin         9.5       Immature Grans (Abs)         0.04  Comment: Mild elevation in immature granulocytes is non specific and   can be seen in a variety of conditions including stress response,   acute inflammation, trauma and pregnancy. Correlation with other   laboratory and clinical findings is essential.         Immature Granulocytes         0.8       Lymph #         0.8       Lymph %         15.4       MCH         30.6       MCHC         33.7       MCV         91       Mono #         0.6       Mono %         11.8       MPV         11.9       nRBC         0       Platelets         74       POCT Glucose 172   184     230         RBC         3.10       RDW         17.9       WBC         5.07                              Significant Imaging: I have reviewed all pertinent imaging results/findings within the past 24 hours.

## 2022-11-12 NOTE — SUBJECTIVE & OBJECTIVE
Interval History: see hospital course     Review of Systems   Constitutional:  Negative for activity change, chills, fatigue and fever.   HENT:  Negative for congestion, rhinorrhea and sore throat.    Eyes:  Negative for visual disturbance.   Respiratory:  Negative for shortness of breath.    Cardiovascular:  Negative for chest pain, palpitations and leg swelling.   Gastrointestinal:  Negative for abdominal distention, abdominal pain, constipation, diarrhea, nausea and vomiting.   Genitourinary:         Patient does not make urine   Musculoskeletal:  Negative for arthralgias, back pain, gait problem and myalgias.   Skin:  Negative for color change and wound.   Allergic/Immunologic: Negative.    Neurological:  Negative for dizziness, seizures, speech difficulty and headaches.   Hematological: Negative.    Psychiatric/Behavioral:  Negative for agitation, behavioral problems and confusion. The patient is not nervous/anxious.    Objective:     Vital Signs (Most Recent):  Temp: 96 °F (35.6 °C) (11/12/22 1507)  Pulse: 65 (11/12/22 1705)  Resp: 18 (11/12/22 1636)  BP: 124/60 (11/12/22 1507)  SpO2: 97 % (11/12/22 1507)   Vital Signs (24h Range):  Temp:  [96 °F (35.6 °C)-98.4 °F (36.9 °C)] 96 °F (35.6 °C)  Pulse:  [] 65  Resp:  [18] 18  SpO2:  [97 %-100 %] 97 %  BP: (113-133)/(55-62) 124/60     Weight: 77.8 kg (171 lb 8.3 oz)  Body mass index is 28.54 kg/m².    Intake/Output Summary (Last 24 hours) at 11/12/2022 1740  Last data filed at 11/12/2022 1715  Gross per 24 hour   Intake 630 ml   Output --   Net 630 ml        Physical Exam  Vitals and nursing note reviewed.   Constitutional:       General: She is not in acute distress.     Appearance: She is well-developed. She is not diaphoretic.   HENT:      Head: Normocephalic and atraumatic.      Nose: Nose normal.   Eyes:      General: No scleral icterus.     Extraocular Movements: Extraocular movements intact.      Pupils: Pupils are equal, round, and reactive to light.    Neck:      Thyroid: No thyromegaly.      Vascular: No JVD.      Trachea: No tracheal deviation.   Cardiovascular:      Rate and Rhythm: Normal rate and regular rhythm.      Heart sounds: Normal heart sounds. No murmur heard.    No friction rub. No gallop.      Comments: LUE AV fistula with + thrill/bruit  Pulmonary:      Effort: Pulmonary effort is normal. No respiratory distress.      Breath sounds: Normal breath sounds. No wheezing or rales.   Chest:      Chest wall: No tenderness.   Abdominal:      General: Bowel sounds are normal. There is no distension.      Palpations: Abdomen is soft. There is no mass.      Tenderness: There is no abdominal tenderness.   Genitourinary:     Comments: deferred  Musculoskeletal:         General: No tenderness or deformity. Normal range of motion.      Cervical back: Normal range of motion and neck supple.   Skin:     General: Skin is warm and dry.      Capillary Refill: Capillary refill takes less than 2 seconds.      Coloration: Skin is not pale.      Findings: No erythema or rash.   Neurological:      Mental Status: She is alert and oriented to person, place, and time. Mental status is at baseline.      Cranial Nerves: No cranial nerve deficit.      Motor: No abnormal muscle tone.      Coordination: Coordination normal.   Psychiatric:         Mood and Affect: Mood normal.         Behavior: Behavior normal.       Significant Labs: All pertinent labs within the past 24 hours have been reviewed.  Recent Lab Results  (Last 5 results in the past 24 hours)        11/12/22  1503   11/12/22  1107   11/12/22  0957   11/12/22  0607   11/12/22  0042        Albumin     3.2           Alkaline Phosphatase     200           ALT     66           Anion Gap     18           AST     78           BILIRUBIN TOTAL     1.1  Comment: For infants and newborns, interpretation of results should be based  on gestational age, weight and in agreement with clinical  observations.    Premature Infant  recommended reference ranges:  Up to 24 hours.............<8.0 mg/dL  Up to 48 hours............<12.0 mg/dL  3-5 days..................<15.0 mg/dL  6-29 days.................<15.0 mg/dL             BUN     59           Calcium     7.8           Chloride     92           CO2     13           Creatinine     8.0           eGFR     6           Glucose     194           POCT Glucose 185   179     142   188       Potassium     5.9           PROTEIN TOTAL     7.3           Sodium     123                                  Significant Imaging: I have reviewed all pertinent imaging results/findings within the past 24 hours.

## 2022-11-12 NOTE — PLAN OF CARE
Problem: Adult Inpatient Plan of Care  Goal: Plan of Care Review  Outcome: Ongoing, Progressing  Goal: Optimal Comfort and Wellbeing  Outcome: Ongoing, Progressing     Problem: Fluid and Electrolyte Imbalance (Acute Kidney Injury/Impairment)  Goal: Fluid and Electrolyte Balance  Outcome: Ongoing, Progressing    POC reviewed, verbalized understanding. Pt remained free from falls, fall precautions in place. VSS. No other c/o at this time. PIV intact. Call bell and personal belongings within reach. Hourly rounding complete. Reminded to call for assistance. Will continue to monitor.

## 2022-11-12 NOTE — PROGRESS NOTES
[facility] ([facility])  [facility] Medicine  Progress Note    Patient Name: Gilda Schroeder  MRN: [id number]  Patient Class: IP- Inpatient   Admission Date: 11/9/2022  Length of Stay: 3 days  Attending Physician: Nina Field, *  Primary Care Provider: Roselia Rose MD        Subjective:     Principal Problem:Complication of AV dialysis fistula        HPI:  Patient is a 47-year-old aa female with past medical history significant for hypertension, type 2 diabetes mellitus, CAD status post cardiac stenting and CABG, chronic diastolic CHF, paroxysmal AFib status post cardioversion, on Eliquis, ESRD on hemodialysis every Tuesday, Thursday and Saturday, gastritis, diabetic gastroparesis, who presented as a transfer from [facility] for evaluation of bleeding AV fistula. Patient was recently admitted for abdominal pain along with nausea and vomiting. CT abd and pelvis, CTA, and MRCP were unremarkable. She was treated for dka. GI consulted on case and EGD performed on 11/9 showed gastritis. Nephro consulted on case for dialysis. AV fistula started bleeding on 11/8. Pressure was applied however fistula continued to bleed. A small stitch was placed. Nephrology then recommended transfer for fistulogram and vascular surgery consult. Case was discussed with Dr. Nielson and patient was transferred to [facility]. AV fistula currently not bleeding and patient is without any complaints.       Overview/Hospital Course:  Patient was admitted for complication of AV dialysis fistula under the care of [facility] medicine. Patient had fistulogram today - will have HD in AM. BP is stable. Will restart liquid diet and advance as tolerated. Will need PO protonix 40mg BID X 2 months and PO sucralfate QID X 2 weeks. Discussed with GI (Avinash) who recommends hold reglan for now as EDG showed no food in stomach. If tolerates diet, will likely dc tomorrow.    11/11 POD #1, s/p FISTULOGRAM With 8 x 100 mm balloon  angioplasty of 90% distal anastomotic stricture. Site edematous with ecchymosis this am, no evidence of active bleeding. HD stable. Per nephrology hold HD today, will reassess in am. If no improvement in site will consult IR in am for placement of vas cath.     11/12 POD #2 edema to AVF site has improved, ok for HD on tomorrow per vascular. K 5.9 give lokelma, f/u CMP in am       Interval History: see hospital course     Review of Systems   Constitutional:  Negative for activity change, chills, fatigue and fever.   HENT:  Negative for congestion, rhinorrhea and sore throat.    Eyes:  Negative for visual disturbance.   Respiratory:  Negative for shortness of breath.    Cardiovascular:  Negative for chest pain, palpitations and leg swelling.   Gastrointestinal:  Negative for abdominal distention, abdominal pain, constipation, diarrhea, nausea and vomiting.   Genitourinary:         Patient does not make urine   Musculoskeletal:  Negative for arthralgias, back pain, gait problem and myalgias.   Skin:  Negative for color change and wound.   Allergic/Immunologic: Negative.    Neurological:  Negative for dizziness, seizures, speech difficulty and headaches.   Hematological: Negative.    Psychiatric/Behavioral:  Negative for agitation, behavioral problems and confusion. The patient is not nervous/anxious.    Objective:     Vital Signs (Most Recent):  Temp: 96 °F (35.6 °C) (11/12/22 1507)  Pulse: 65 (11/12/22 1705)  Resp: 18 (11/12/22 1636)  BP: 124/60 (11/12/22 1507)  SpO2: 97 % (11/12/22 1507)   Vital Signs (24h Range):  Temp:  [96 °F (35.6 °C)-98.4 °F (36.9 °C)] 96 °F (35.6 °C)  Pulse:  [] 65  Resp:  [18] 18  SpO2:  [97 %-100 %] 97 %  BP: (113-133)/(55-62) 124/60     Weight: 77.8 kg (171 lb 8.3 oz)  Body mass index is 28.54 kg/m².    Intake/Output Summary (Last 24 hours) at 11/12/2022 1740  Last data filed at 11/12/2022 1715  Gross per 24 hour   Intake 630 ml   Output --   Net 630 ml        Physical Exam  Vitals and  nursing note reviewed.   Constitutional:       General: She is not in acute distress.     Appearance: She is well-developed. She is not diaphoretic.   HENT:      Head: Normocephalic and atraumatic.      Nose: Nose normal.   Eyes:      General: No scleral icterus.     Extraocular Movements: Extraocular movements intact.      Pupils: Pupils are equal, round, and reactive to light.   Neck:      Thyroid: No thyromegaly.      Vascular: No JVD.      Trachea: No tracheal deviation.   Cardiovascular:      Rate and Rhythm: Normal rate and regular rhythm.      Heart sounds: Normal heart sounds. No murmur heard.    No friction rub. No gallop.      Comments: LUE AV fistula with + thrill/bruit  Pulmonary:      Effort: Pulmonary effort is normal. No respiratory distress.      Breath sounds: Normal breath sounds. No wheezing or rales.   Chest:      Chest wall: No tenderness.   Abdominal:      General: Bowel sounds are normal. There is no distension.      Palpations: Abdomen is soft. There is no mass.      Tenderness: There is no abdominal tenderness.   Genitourinary:     Comments: deferred  Musculoskeletal:         General: No tenderness or deformity. Normal range of motion.      Cervical back: Normal range of motion and neck supple.   Skin:     General: Skin is warm and dry.      Capillary Refill: Capillary refill takes less than 2 seconds.      Coloration: Skin is not pale.      Findings: No erythema or rash.   Neurological:      Mental Status: She is alert and oriented to person, place, and time. Mental status is at baseline.      Cranial Nerves: No cranial nerve deficit.      Motor: No abnormal muscle tone.      Coordination: Coordination normal.   Psychiatric:         Mood and Affect: Mood normal.         Behavior: Behavior normal.       Significant Labs: All pertinent labs within the past 24 hours have been reviewed.  Recent Lab Results  (Last 5 results in the past 24 hours)        11/12/22  1503   11/12/22  1107    11/12/22  0957   11/12/22  0607   11/12/22  0042        Albumin     3.2           Alkaline Phosphatase     200           ALT     66           Anion Gap     18           AST     78           BILIRUBIN TOTAL     1.1  Comment: For infants and newborns, interpretation of results should be based  on gestational age, weight and in agreement with clinical  observations.    Premature Infant recommended reference ranges:  Up to 24 hours.............<8.0 mg/dL  Up to 48 hours............<12.0 mg/dL  3-5 days..................<15.0 mg/dL  6-29 days.................<15.0 mg/dL             BUN     59           Calcium     7.8           Chloride     92           CO2     13           Creatinine     8.0           eGFR     6           Glucose     194           POCT Glucose 185   179     142   188       Potassium     5.9           PROTEIN TOTAL     7.3           Sodium     123                                  Significant Imaging: I have reviewed all pertinent imaging results/findings within the past 24 hours.      Assessment/Plan:      * Complication of AV dialysis fistula  Keep npo  Vascular consulted on case  Possible fistulogram   11/10:  --had fistulagram today  --patent - will f/u with Vascular in 2 weeks    11/11  POD #1, s/p FISTULOGRAM With 8 x 100 mm balloon angioplasty of 90% distal anastomotic stricture  Hold HD today     11/12  Edema to AVF improving, plan HD tomorrow          Type 2 diabetes mellitus  Patient's FSGs are controlled on current medication regimen.  Last A1c reviewed-   Lab Results   Component Value Date    HGBA1C 7.5 (H) 11/05/2022     Most recent fingerstick glucose reviewed-   Recent Labs   Lab 11/09/22  1104 11/09/22  1640 11/09/22  2107   POCTGLUCOSE 119* 91 202*     Current correctional scale  Low  Maintain anti-hyperglycemic dose as follows-   Antihyperglycemics (From admission, onward)    Start     Stop Route Frequency Ordered    11/10/22 0109  insulin aspart U-100 pen 0-5 Units         -- SubQ Every  6 hours PRN 11/10/22 0010        Hold Oral hypoglycemics while patient is in the hospital.    Gastritis  --Cont protonix 40mg BID X 2 months  --Sucralfate qid X 2 weeks  --OP f/u with GI after discharge      Gastroparesis  reglan tid      ESRD (end stage renal disease)  --nephrology following  --will have HD tomorrow AM    11/11  POD #1, s/p FISTULOGRAM With 8 x 100 mm balloon angioplasty of 90% distal anastomotic stricture  Wrap LUE with ace to aid with  Swelling, monitor site   Hold HD today          Anemia in CKD (chronic kidney disease)  H/h stable   cont to monitor         VTE Risk Mitigation (From admission, onward)         Ordered     Place sequential compression device  Until discontinued         11/10/22 1526     IP VTE HIGH RISK PATIENT  Once         11/10/22 1526     Place sequential compression device  Until discontinued         11/10/22 0503                Discharge Planning   MARK:  11/14/22    Code Status: Full Code   Is the patient medically ready for discharge?:     Reason for patient still in hospital (select all that apply): Patient trending condition and Treatment  Discharge Plan A: Home, Home with family                  Alyssa Pickering NP  Department of Hospital Medicine   [facility] - Tele[facility] ([facility])

## 2022-11-12 NOTE — PROGRESS NOTES
[facility] ([facility])  Vascular Surgery  Progress Note    Patient Name: Gilda Schroeder  MRN: [id number]  Admission Date: 11/9/2022  Primary Care Provider: Roselia Rose MD    Subjective:     Interval History: no acute events  Still has some edema near arterial anastamosis  +thrill    Post-Op Info:  Procedure(s) (LRB):  FISTULOGRAM (N/A)   2 Days Post-Op      Medications:  Continuous Infusions:  Scheduled Meds:   buPROPion  100 mg Oral BID    carvediloL  6.25 mg Oral BID    EScitalopram oxalate  20 mg Oral QAM    losartan  100 mg Oral Daily    metoclopramide HCl  5 mg Oral Q6H    mupirocin   Nasal BID    pantoprazole  40 mg Oral BID    sucralfate  1 g Oral Q6H     PRN Meds:acetaminophen, dextrose 10%, dextrose 10%, glucagon (human recombinant), hydrALAZINE, HYDROcodone-acetaminophen, influenza, insulin aspart U-100, iohexoL, ondansetron     Objective:     Vital Signs (Most Recent):  Temp: 97 °F (36.1 °C) (11/12/22 0706)  Pulse: 69 (11/12/22 0706)  Resp: 18 (11/12/22 0706)  BP: 133/61 (11/12/22 0706)  SpO2: 97 % (11/12/22 0706) Vital Signs (24h Range):  Temp:  [97 °F (36.1 °C)-97.9 °F (36.6 °C)] 97 °F (36.1 °C)  Pulse:  [] 69  Resp:  [18] 18  SpO2:  [97 %-100 %] 97 %  BP: (113-133)/(55-79) 133/61         Physical Exam  Left arm AVG with +thrill  +ecchymosis and swelling near arterial anastamosis    Significant Labs:  BMP: No results for input(s): GLU, NA, K, CL, CO2, BUN, CREATININE, CALCIUM, MG in the last 48 hours.  CBC:   Recent Labs   Lab 11/11/22  0451   WBC 5.07   RBC 3.10*   HGB 9.5*   HCT 28.2*   PLT 74*   MCV 91   MCH 30.6   MCHC 33.7     Coagulation: No results for input(s): LABPROT, INR, APTT in the last 48 hours.    Significant Diagnostics:  I have reviewed all pertinent imaging results/findings within the past 24 hours.    Assessment/Plan:     Active Diagnoses:    Diagnosis Date Noted POA    PRINCIPAL PROBLEM:  Complication of AV dialysis fistula [T82.9XXA] 11/09/2022 Yes     Gastritis [K29.70]  Yes    Gastroparesis [K31.84] 01/17/2018 Yes    Type 2 diabetes mellitus [E11.9] 01/17/2018 Yes    ESRD (end stage renal disease) [N18.6] 08/18/2017 Yes    Anemia in CKD (chronic kidney disease) [N18.9, D63.1] 07/05/2016 Yes      Problems Resolved During this Admission:     Left arm AVF with +thrill  Ok to use AVF tomorrow for HD  Avoid ecchymotic area  Can follow up with vascular in 2 weeks  Will sign off    Rashawn Nielson IV, MD  Vascular Surgery  [facility] - Tele[facility] ([facility])

## 2022-11-12 NOTE — ASSESSMENT & PLAN NOTE
Keep npo  Vascular consulted on case  Possible fistulogram   11/10:  --had fistulagram today  --patent - will f/u with Vascular in 2 weeks    11/11  POD #1, s/p FISTULOGRAM With 8 x 100 mm balloon angioplasty of 90% distal anastomotic stricture  Hold HD today     11/12  Edema to AVF improving, plan HD tomorrow

## 2022-11-13 NOTE — PROGRESS NOTES
11/12/22 2215   Post-Hemodialysis Assessment   Blood Volume Processed (Liters) 48 L   Dialyzer Clearance Lightly streaked   Duration of Treatment 120 minutes   Total UF (mL) 1000 mL   Patient Response to Treatment Pt tolerated tx well   Arterial bleeding stop time (min) 5 min   Venous bleeding stop time (min) 5 min   Post-Hemodialysis Comments Pt deaccessed per P&P. No issues with access during/after tx.

## 2022-11-13 NOTE — PROGRESS NOTES
Progress Note  Nephrology    Admit Date: 11/9/2022   LOS: 4 days     SUBJECTIVE:     Follow-up For: ESRD    Interval History:S/P AVF angioplasty had echymosis,good blood flow  Pt seen on HD,feeling ok denies dizzyness or cramping.    Active Ambulatory Problems     Diagnosis Date Noted    Anemia in CKD (chronic kidney disease) 07/05/2016    ESRD (end stage renal disease) 08/18/2017    Patient on waiting list for kidney transplant 08/24/2017    NSTEMI (non-ST elevated myocardial infarction) 12/10/2017    Gastroesophageal reflux disease without esophagitis 01/10/2018    Abdominal pain with vomiting 01/11/2018    Coronary artery disease involving native coronary artery of native heart 01/16/2018    ESRD (end stage renal disease) on dialysis 10/13/2018    Gastroparesis 01/17/2018    CAD (coronary artery disease)     Gastritis     Acute respiratory failure with hypoxia 06/09/2019    Hyperkalemia 06/09/2019    Hyperlipidemia 06/19/2019    Chest pain 11/08/2019    Pseudoaneurysm of right femoral artery 11/10/2019    ESRD on hemodialysis     Diabetic gastroparesis 01/06/2016    DM (diabetes mellitus), type 2, uncontrolled 03/20/2014    Type II or unspecified type diabetes mellitus with ophthalmic manifestations, not stated as uncontrolled(250.50) 10/01/2014    Diabetic retinopathy 10/01/2014    Non-ST elevation MI (NSTEMI) 11/20/2019    ACS (acute coronary syndrome)     Contamination of blood culture 07/01/2020    Stented coronary artery 11/11/2020    Acute on chronic diastolic CHF (congestive heart failure) 11/12/2020    S/P CABG x 5 11/16/2020    PAF (paroxysmal atrial fibrillation) 12/06/2020    Nausea and vomiting     Abnormal EKG 12/03/2019    Acute exacerbation of CHF (congestive heart failure) 12/02/2019    Disorder of bone, unspecified 01/18/2018    Dependence on renal dialysis 01/17/2018    Difficult intravenous access 12/03/2019    Uncontrolled hypertension 12/03/2019    Elevated LFTs 12/03/2019    Headache,  unspecified 03/10/2018    History of anemia due to CKD 12/18/2019    Hypertensive chronic kidney disease with stage 5 chronic kidney disease or end stage renal disease 01/17/2018    Hypothyroidism, unspecified 10/28/2019    Other disorders of electrolyte and fluid balance, not elsewhere classified 01/17/2018    Other disorders of magnesium metabolism 01/17/2018    Hyperphosphatemia 12/18/2019    Other retention of urine 03/29/2018    Other specified coagulation defects 01/17/2018    Secondary hyperparathyroidism of renal origin 01/17/2018    Anemia in chronic kidney disease 01/17/2018    Hypertensive urgency 12/03/2019    Chronic atrial fibrillation, unspecified 01/17/2018    Acute kidney injury superimposed on CKD 12/03/2019    End stage renal disease 01/17/2018    ESRD needing dialysis 08/14/2018    Gastro-esophageal reflux disease without esophagitis 08/14/2019    Hyperlipidemia, unspecified 10/28/2019    Other obesity due to excess calories 08/14/2019    Non-ST elevation (NSTEMI) myocardial infarction 01/28/2019    Encounter for immunization 09/26/2018    Shortness of breath 12/02/2019    Type 2 diabetes mellitus 01/17/2018    Anemia 09/24/2021    Bradycardia 05/10/2022    Elevated troponin     DKA (diabetic ketoacidosis) 07/21/2022    ACP (advance care planning) 07/21/2022    Hypoxia 07/21/2022    Reactive depression 07/22/2022    Hypertensive emergency     Chronic diastolic heart failure 08/05/2022    Depression 08/05/2022    Mild major depression 09/07/2022    Metabolic acidosis 11/04/2022    Hyperbilirubinemia 11/06/2022    History of diabetic gastroparesis 11/06/2022    Chronic passive congestion of liver 11/06/2022    Tricuspid valve insufficiency 11/06/2022    Complication of AV dialysis fistula 11/09/2022     Resolved Ambulatory Problems     Diagnosis Date Noted    ESRD on peritoneal dialysis 07/05/2016    Organ transplant candidate 07/05/2016    Pre-transplant evaluation for chronic kidney disease  07/05/2016    Type 2 diabetes mellitus, with long-term current use of insulin 07/05/2016    Benign hypertension with ESRD (end-stage renal disease) 07/05/2016    Insulin long-term use 07/05/2016    Obesity 07/05/2016    BMI 35.0-35.9,adult 07/05/2016    Pilonidal cyst 09/21/2016    Peritoneal dialysis-associated peritonitis 08/18/2017    Unstable angina pectoris 12/12/2017    SOB (shortness of breath) 12/12/2017    Palpitations 01/10/2018    Sinus tachycardia 01/11/2018    Epigastric pain 08/31/2018    Hypertensive urgency 08/31/2018    Gastroparesis 08/31/2018    HTN (hypertension)     Diabetes mellitus     Lactic acidosis 10/15/2018    Transaminitis 01/15/2019    NSTEMI (non-ST elevated myocardial infarction) 01/16/2019    Chronic renal failure 03/11/2019    Volume overload 03/11/2019    Nausea 03/11/2019    Acute gastritis without hemorrhage 03/16/2019    Abnormal nuclear stress test     Hypertensive crisis 06/09/2019    Orthostatic hypotension 06/11/2019    Ischemic cardiomyopathy 10/17/2019    Pseudoaneurysm 11/11/2019    Proteinuria 07/02/2014    Hypoglycemia 06/30/2020    Hypothermia 07/01/2020    Elevated troponin level 11/11/2020    Coronary artery disease 11/16/2020    Hyponatremia 11/20/2020    Constipation 11/21/2020    Iron deficiency anemia, unspecified 01/17/2018    Mild protein-calorie malnutrition 01/17/2018    Poor historian 12/03/2019    Type 2 diabetes mellitus with hyperglycemia 12/03/2019    Fluid overload, unspecified 09/17/2019    Hypokalemia 09/24/2021    Tachycardia     Hypercalcemia 11/04/2022     Past Medical History:   Diagnosis Date    Allergy     Anticoagulant long-term use     Atrial fibrillation     Chronic diastolic CHF (congestive heart failure)     Coronary artery disease involving native coronary artery of native heart with unstable angina pectoris 11/16/2020    Diabetes mellitus with ESRD (end-stage renal disease) 07/05/2016    Disorder of kidney and ureter     History of heart  artery stent 11/16/2020    Peritonitis associated with peritoneal dialysis 08/2017    Tricuspid regurgitation       OBJECTIVE:     Vital Signs (Most Recent)  Temp: 97.8 °F (36.6 °C) (11/13/22 0404)  Pulse: 75 (11/13/22 0705)  Resp: 18 (11/13/22 0413)  BP:  (pt is COTY for HD) (11/13/22 0800)  SpO2: 95 % (11/13/22 0404)    Vital Signs Range (Last 24H):  Temp:  [96 °F (35.6 °C)-98.4 °F (36.9 °C)]   Pulse:  [65-79]   Resp:  [17-21]   BP: (124-181)/(60-72)   SpO2:  [95 %-100 %]       Intake/Output Summary (Last 24 hours) at 11/13/2022 1027  Last data filed at 11/12/2022 2212  Gross per 24 hour   Intake 450 ml   Output 1000 ml   Net -550 ml        Review of Systems   Constitutional: Negative.    HENT: Negative.     Eyes: Negative.    Respiratory: Negative.     Cardiovascular: Negative.    Gastrointestinal: Negative.    Genitourinary: Negative.    Musculoskeletal: Negative.    Skin: Negative.    Neurological: Negative.    Endo/Heme/Allergies: Negative.    Psychiatric/Behavioral: Negative.        Physical Exam  Constitutional:       General: She is not in acute distress.  HENT:      Mouth/Throat:      Mouth: Mucous membranes are moist.   Eyes:      Pupils: Pupils are equal, round, and reactive to light.   Neck:      Vascular: No JVD.   Cardiovascular:      Heart sounds: S1 normal and S2 normal.   Pulmonary:      Effort: Pulmonary effort is normal.   Abdominal:      General: Bowel sounds are normal.   Musculoskeletal:      Right lower leg: No edema.      Left lower leg: No edema.   Skin:     General: Skin is warm.   Neurological:      Mental Status: She is alert and oriented to person, place, and time.   Psychiatric:         Behavior: Behavior normal.          Anesthesia/Surgery risks, benefits and alternative options discussed and understood by patient/family. ex      Current Facility-Administered Medications:     acetaminophen tablet 650 mg, 650 mg, Oral, Q6H PRN, Tony Houston MD, 650 mg at 11/13/22 0606    buPROPion  TBSR 12 hr tablet 100 mg, 100 mg, Oral, BID, Tony Houston MD, 100 mg at 11/12/22 2242    carvediloL tablet 6.25 mg, 6.25 mg, Oral, BID, Tony Houston MD, 6.25 mg at 11/12/22 2242    dextrose 10% bolus 125 mL, 12.5 g, Intravenous, PRN, Tony Houston MD    dextrose 10% bolus 250 mL, 25 g, Intravenous, PRN, Tony Houston MD    EScitalopram oxalate tablet 20 mg, 20 mg, Oral, QAM, Tony Houston MD, 20 mg at 11/13/22 0606    glucagon (human recombinant) injection 1 mg, 1 mg, Intramuscular, PRN, Tony Houston MD    hydrALAZINE injection 10 mg, 10 mg, Intravenous, Q6H PRN, Tony Houston MD, 10 mg at 11/12/22 2244    HYDROcodone-acetaminophen 5-325 mg per tablet 1 tablet, 1 tablet, Oral, Q4H PRN, Tony Houston MD, 1 tablet at 11/13/22 0413    influenza (QUADRIVALENT PF) vaccine 0.5 mL, 0.5 mL, Intramuscular, Prior to discharge, Tony Houston MD    insulin aspart U-100 pen 0-5 Units, 0-5 Units, Subcutaneous, Q6H PRN, Tony Houston MD, 2 Units at 11/11/22 0558    iohexoL (OMNIPAQUE 240) injection, , , PRN, Tony Houston MD, 5 mL at 11/10/22 1527    metoclopramide HCl tablet 5 mg, 5 mg, Oral, Q6H, Tony Houston MD, 5 mg at 11/13/22 0606    mupirocin 2 % ointment, , Nasal, BID, Tony Houston MD, Given at 11/12/22 2242    ondansetron tablet 4 mg, 4 mg, Oral, Q6H PRN, Tony Houston MD, 4 mg at 11/12/22 1250    pantoprazole EC tablet 40 mg, 40 mg, Oral, BID, PAWAN Flor, 40 mg at 11/12/22 2242    sucralfate 100 mg/mL suspension 1 g, 1 g, Oral, Q6H, PAWAN Flor, 1 g at 11/13/22 0606     Laboratory:  Recent Labs   Lab 11/07/22  1500 11/08/22  0447 11/09/22  0520 11/10/22  0459 11/12/22  0957 11/13/22  0526   * 126*  126* 127* 127* 123* 130*   K 5.1 4.5  4.5 4.6 4.7 5.9* 4.6   CL 86* 84*  84* 93* 93* 92* 91*   CO2 23 27  27 18* 21* 13* 19*   BUN 24* 33*  33* 25* 34* 59* 40*   CREATININE 4.60* 5.41*  5.41* 4.14* 5.5* 8.0* 6.1*   CALCIUM 9.1 8.3*  8.3* 8.2* 8.0*  7.8* 8.4*   PHOS 3.5 2.7 2.5*  --   --   --      Recent Labs   Lab 11/10/22  0459 11/11/22  0451 11/13/22  0526   WBC 5.30 5.07 7.94   HGB 10.3* 9.5* 10.2*   HCT 30.4* 28.2* 30.3*   * 74* 73*        Diagnostic Results:      ASSESSMENT/PLAN:     ESRD:Dialysis dependant,tolerating well    Hyperkalemia:received HD yesterday & HD today as well  On 2K bath,losartan reduced to 25 mgs daily    Hyponatremia:To optimise the volume status    Renal osteodystrophy:to repeat phos & adjust binders  accordingly    Thank you for allowing me to participate in the care of this patient.      Chantal Osborn MD  Nephrology

## 2022-11-13 NOTE — PROGRESS NOTES
Progress Note  Nephrology    Admit Date: 11/9/2022   LOS: 3 days     SUBJECTIVE:     Follow-up For: ESRD    Interval History:S/P AVF angioplasty,developed swelling at the site  Vascular recommended it to rest,possible canulation tomorrow.  Pt denies SOB or swelling of lower ext.    Active Ambulatory Problems     Diagnosis Date Noted    Anemia in CKD (chronic kidney disease) 07/05/2016    ESRD (end stage renal disease) 08/18/2017    Patient on waiting list for kidney transplant 08/24/2017    NSTEMI (non-ST elevated myocardial infarction) 12/10/2017    Gastroesophageal reflux disease without esophagitis 01/10/2018    Abdominal pain with vomiting 01/11/2018    Coronary artery disease involving native coronary artery of native heart 01/16/2018    ESRD (end stage renal disease) on dialysis 10/13/2018    Gastroparesis 01/17/2018    CAD (coronary artery disease)     Gastritis     Acute respiratory failure with hypoxia 06/09/2019    Hyperkalemia 06/09/2019    Hyperlipidemia 06/19/2019    Chest pain 11/08/2019    Pseudoaneurysm of right femoral artery 11/10/2019    ESRD on hemodialysis     Diabetic gastroparesis 01/06/2016    DM (diabetes mellitus), type 2, uncontrolled 03/20/2014    Type II or unspecified type diabetes mellitus with ophthalmic manifestations, not stated as uncontrolled(250.50) 10/01/2014    Diabetic retinopathy 10/01/2014    Non-ST elevation MI (NSTEMI) 11/20/2019    ACS (acute coronary syndrome)     Contamination of blood culture 07/01/2020    Stented coronary artery 11/11/2020    Acute on chronic diastolic CHF (congestive heart failure) 11/12/2020    S/P CABG x 5 11/16/2020    PAF (paroxysmal atrial fibrillation) 12/06/2020    Nausea and vomiting     Abnormal EKG 12/03/2019    Acute exacerbation of CHF (congestive heart failure) 12/02/2019    Disorder of bone, unspecified 01/18/2018    Dependence on renal dialysis 01/17/2018    Difficult intravenous access 12/03/2019    Uncontrolled hypertension  12/03/2019    Elevated LFTs 12/03/2019    Headache, unspecified 03/10/2018    History of anemia due to CKD 12/18/2019    Hypertensive chronic kidney disease with stage 5 chronic kidney disease or end stage renal disease 01/17/2018    Hypothyroidism, unspecified 10/28/2019    Other disorders of electrolyte and fluid balance, not elsewhere classified 01/17/2018    Other disorders of magnesium metabolism 01/17/2018    Hyperphosphatemia 12/18/2019    Other retention of urine 03/29/2018    Other specified coagulation defects 01/17/2018    Secondary hyperparathyroidism of renal origin 01/17/2018    Anemia in chronic kidney disease 01/17/2018    Hypertensive urgency 12/03/2019    Chronic atrial fibrillation, unspecified 01/17/2018    Acute kidney injury superimposed on CKD 12/03/2019    End stage renal disease 01/17/2018    ESRD needing dialysis 08/14/2018    Gastro-esophageal reflux disease without esophagitis 08/14/2019    Hyperlipidemia, unspecified 10/28/2019    Other obesity due to excess calories 08/14/2019    Non-ST elevation (NSTEMI) myocardial infarction 01/28/2019    Encounter for immunization 09/26/2018    Shortness of breath 12/02/2019    Type 2 diabetes mellitus 01/17/2018    Anemia 09/24/2021    Bradycardia 05/10/2022    Elevated troponin     DKA (diabetic ketoacidosis) 07/21/2022    ACP (advance care planning) 07/21/2022    Hypoxia 07/21/2022    Reactive depression 07/22/2022    Hypertensive emergency     Chronic diastolic heart failure 08/05/2022    Depression 08/05/2022    Mild major depression 09/07/2022    Metabolic acidosis 11/04/2022    Hyperbilirubinemia 11/06/2022    History of diabetic gastroparesis 11/06/2022    Chronic passive congestion of liver 11/06/2022    Tricuspid valve insufficiency 11/06/2022    Complication of AV dialysis fistula 11/09/2022     Resolved Ambulatory Problems     Diagnosis Date Noted    ESRD on peritoneal dialysis 07/05/2016    Organ transplant candidate 07/05/2016     Pre-transplant evaluation for chronic kidney disease 07/05/2016    Type 2 diabetes mellitus, with long-term current use of insulin 07/05/2016    Benign hypertension with ESRD (end-stage renal disease) 07/05/2016    Insulin long-term use 07/05/2016    Obesity 07/05/2016    BMI 35.0-35.9,adult 07/05/2016    Pilonidal cyst 09/21/2016    Peritoneal dialysis-associated peritonitis 08/18/2017    Unstable angina pectoris 12/12/2017    SOB (shortness of breath) 12/12/2017    Palpitations 01/10/2018    Sinus tachycardia 01/11/2018    Epigastric pain 08/31/2018    Hypertensive urgency 08/31/2018    Gastroparesis 08/31/2018    HTN (hypertension)     Diabetes mellitus     Lactic acidosis 10/15/2018    Transaminitis 01/15/2019    NSTEMI (non-ST elevated myocardial infarction) 01/16/2019    Chronic renal failure 03/11/2019    Volume overload 03/11/2019    Nausea 03/11/2019    Acute gastritis without hemorrhage 03/16/2019    Abnormal nuclear stress test     Hypertensive crisis 06/09/2019    Orthostatic hypotension 06/11/2019    Ischemic cardiomyopathy 10/17/2019    Pseudoaneurysm 11/11/2019    Proteinuria 07/02/2014    Hypoglycemia 06/30/2020    Hypothermia 07/01/2020    Elevated troponin level 11/11/2020    Coronary artery disease 11/16/2020    Hyponatremia 11/20/2020    Constipation 11/21/2020    Iron deficiency anemia, unspecified 01/17/2018    Mild protein-calorie malnutrition 01/17/2018    Poor historian 12/03/2019    Type 2 diabetes mellitus with hyperglycemia 12/03/2019    Fluid overload, unspecified 09/17/2019    Hypokalemia 09/24/2021    Tachycardia     Hypercalcemia 11/04/2022     Past Medical History:   Diagnosis Date    Allergy     Anticoagulant long-term use     Atrial fibrillation     Chronic diastolic CHF (congestive heart failure)     Coronary artery disease involving native coronary artery of native heart with unstable angina pectoris 11/16/2020    Diabetes mellitus with ESRD (end-stage renal disease) 07/05/2016     Disorder of kidney and ureter     History of heart artery stent 11/16/2020    Peritonitis associated with peritoneal dialysis 08/2017    Tricuspid regurgitation       OBJECTIVE:     Vital Signs (Most Recent)  Temp: 96 °F (35.6 °C) (11/12/22 1507)  Pulse: 65 (11/12/22 1705)  Resp: 18 (11/12/22 1636)  BP: 124/60 (11/12/22 1507)  SpO2: 97 % (11/12/22 1507)    Vital Signs Range (Last 24H):  Temp:  [96 °F (35.6 °C)-98.4 °F (36.9 °C)]   Pulse:  []   Resp:  [18]   BP: (113-133)/(55-62)   SpO2:  [97 %-100 %]       Intake/Output Summary (Last 24 hours) at 11/12/2022 1810  Last data filed at 11/12/2022 1715  Gross per 24 hour   Intake 630 ml   Output --   Net 630 ml        Review of Systems   Constitutional:  Negative for chills and fever.   HENT: Negative.     Eyes: Negative.    Respiratory: Negative.     Cardiovascular: Negative.    Gastrointestinal: Negative.    Genitourinary: Negative.    Musculoskeletal: Negative.    Skin: Negative.    Neurological: Negative.    Endo/Heme/Allergies: Negative.    Psychiatric/Behavioral: Negative.        Physical Exam  HENT:      Head: Normocephalic.      Mouth/Throat:      Mouth: Mucous membranes are moist.   Eyes:      Pupils: Pupils are equal, round, and reactive to light.   Neck:      Vascular: JVD present.   Cardiovascular:      Heart sounds: S1 normal and S2 normal.   Pulmonary:      Effort: Pulmonary effort is normal.   Abdominal:      General: Bowel sounds are normal.   Musculoskeletal:      Right lower leg: No edema.      Left lower leg: No edema.   Skin:     General: Skin is warm.   Neurological:      Mental Status: She is alert and oriented to person, place, and time.   Psychiatric:         Behavior: Behavior normal.          Anesthesia/Surgery risks, benefits and alternative options discussed and understood by patient/family. ex      Current Facility-Administered Medications:     acetaminophen tablet 650 mg, 650 mg, Oral, Q6H PRN, Tony Houston MD    buPROPion TBSR  12 hr tablet 100 mg, 100 mg, Oral, BID, Tony Houston MD, 100 mg at 11/12/22 0811    carvediloL tablet 6.25 mg, 6.25 mg, Oral, BID, Tony Houston MD, 6.25 mg at 11/12/22 0811    dextrose 10% bolus 125 mL, 12.5 g, Intravenous, PRN, Tony Houston MD    dextrose 10% bolus 250 mL, 25 g, Intravenous, PRN, Tony Houston MD    EScitalopram oxalate tablet 20 mg, 20 mg, Oral, QAM, Tony Houston MD, 20 mg at 11/12/22 0607    glucagon (human recombinant) injection 1 mg, 1 mg, Intramuscular, PRN, Tony Houston MD    hydrALAZINE injection 10 mg, 10 mg, Intravenous, Q6H PRN, Tony Houston MD    HYDROcodone-acetaminophen 5-325 mg per tablet 1 tablet, 1 tablet, Oral, Q4H PRN, Tony Houston MD, 1 tablet at 11/12/22 1636    influenza (QUADRIVALENT PF) vaccine 0.5 mL, 0.5 mL, Intramuscular, Prior to discharge, Tony Houston MD    insulin aspart U-100 pen 0-5 Units, 0-5 Units, Subcutaneous, Q6H PRN, Tony Houston MD, 2 Units at 11/11/22 0558    iohexoL (OMNIPAQUE 240) injection, , , PRN, Tony Houston MD, 5 mL at 11/10/22 1527    losartan tablet 100 mg, 100 mg, Oral, Daily, Tony Houston MD, 100 mg at 11/12/22 0811    metoclopramide HCl tablet 5 mg, 5 mg, Oral, Q6H, Tony Houston MD, 5 mg at 11/12/22 1747    mupirocin 2 % ointment, , Nasal, BID, Tony Houston MD, Given at 11/12/22 0810    ondansetron tablet 4 mg, 4 mg, Oral, Q6H PRN, Tony Houston MD, 4 mg at 11/12/22 1250    pantoprazole EC tablet 40 mg, 40 mg, Oral, BID, TALAT Flor-SANTY, 40 mg at 11/12/22 0811    sodium zirconium cyclosilicate packet 10 g, 10 g, Oral, Once, Alyssa Pickering, NP    sucralfate 100 mg/mL suspension 1 g, 1 g, Oral, Q6H, Daniela Garibay, ANAMP-C, 1 g at 11/12/22 1747     Laboratory:  Recent Labs   Lab 11/07/22  1500 11/08/22  0447 11/09/22  0520 11/10/22  0459 11/12/22  0957   * 126*  126* 127* 127* 123*   K 5.1 4.5  4.5 4.6 4.7 5.9*   CL 86* 84*  84* 93* 93* 92*   CO2 23 27  27 18* 21* 13*   BUN 24*  33*  33* 25* 34* 59*   CREATININE 4.60* 5.41*  5.41* 4.14* 5.5* 8.0*   CALCIUM 9.1 8.3*  8.3* 8.2* 8.0* 7.8*   PHOS 3.5 2.7 2.5*  --   --      Recent Labs   Lab 11/08/22  0447 11/08/22  2015 11/10/22  0459 11/11/22 0451   WBC 5.73  --  5.30 5.07   HGB 11.7* 11.6* 10.3* 9.5*   HCT 34.1*  --  30.4* 28.2*     --  102* 74*        Diagnostic Results:      ASSESSMENT/PLAN:     Hyperkalemia:Sec to ESRD missing the treatments  Was on ARB's,to treat with Calcium gluconate  & lokelma,HD now    ESRD:Missed HD,will    Metabolic acidosis:for HD now    HTN:BP acceptable    D/W  regarding cannulating AVF,its  OK to use,will dialyse her for 2 hours short treatment  Now & complete treatment in AM        Thank you for allowing me to participate in the care of this patient.      Chantal Osborn MD  Nephrology

## 2022-11-13 NOTE — NURSING TRANSFER
Nursing Transfer Note      11/12/2022     Reason patient is being transferred: Back to TELE room 254    Transfer From: HD ON 5TH FLOOR    Transfer via bed    Transfer with cardiac monitoring    Transported by DONALDO CLOUD AND LEELA ANGELES    Medicines sent: N/A    Any special needs or follow-up needed: POST TREATMENT HEMODYNAMIC MONITORING       Chart send with patient: Yes    Notified: spouse    Patient reassessed at: 11/12 @ 5105   Upon arrival to floor: cardiac monitor applied, patient oriented to room, call bell in reach, and bed in lowest position

## 2022-11-13 NOTE — PROGRESS NOTES
"Notified provider of pts BP   BP (!) 230/95   Pulse 88   Temp 97.8 °F (36.6 °C)   Resp 18   Ht 5' 5" (1.651 m)   Wt 82.3 kg (181 lb 7 oz)   LMP 11/07/2020   SpO2 99%   Breastfeeding No   BMI 30.19 kg/m²   Prn hydralazine given   Will repeat BP in 30 min     Repeat BP BP (!) 195/80 (BP Location: Right arm, Patient Position: Sitting)   Pulse 84   Temp 97.8 °F (36.6 °C)   Resp 18   Ht 5' 5" (1.651 m)   Wt 82.3 kg (181 lb 7 oz)   LMP 11/07/2020   SpO2 97%   Breastfeeding No   BMI 30.19 kg/m²   Clonidine administered, will obtain another pressure in thirty minutes and relay BP to NP       Repeat BP BP (!) 182/77   Pulse 82   Temp 97.8 °F (36.6 °C)   Resp 18   Ht 5' 5" (1.651 m)   Wt 82.3 kg (181 lb 7 oz)   LMP 11/07/2020   SpO2 97%   Breastfeeding No   BMI 30.19 kg/m²     Notified NP who states she may DC   "

## 2022-11-13 NOTE — PLAN OF CARE
[facility] - Telemetry (Hospital)  Discharge Final Note    Primary Care Provider: Roselia Rose MD    Expected Discharge Date: 11/13/2022    Final Discharge Note (most recent)       Final Note - 11/13/22 1212          Final Note    Assessment Type Final Discharge Note (P)      Anticipated Discharge Disposition Home or Self Care (P)         Post-Acute Status    Discharge Delays None known at this time (P)                      Important Message from Medicare               Pauline North LMSW 11/13/2022 12:12 PM

## 2022-11-13 NOTE — PROGRESS NOTES
"AVS reviewed with the pt and her spouse   Verbalized understanding to  medications and verbalized understanding of new medication regimen   Both deny further questions. Pt to arrange her PCP apt as he is not affiliated with [facility]  Pt to return to HD on Tuesday and her HD center. Please see soheila note. At this time DC is pending repeat BP    Repeat bp BP (!) 182/77   Pulse 82   Temp 97.8 °F (36.6 °C)   Resp 18   Ht 5' 5" (1.651 m)   Wt 82.3 kg (181 lb 7 oz)   LMP 11/07/2020   SpO2 97%   Breastfeeding No   BMI 30.19 kg/m²    NP cleared pt for DC   "

## 2022-11-13 NOTE — PROGRESS NOTES
11/13/22 1143   Post-Hemodialysis Assessment   Blood Volume Processed (Liters) 72 L   Dialyzer Clearance Lightly streaked   Duration of Treatment 180 minutes   Total UF (mL) 2000 mL   Patient Response to Treatment Pt tolerated tx well   Arterial bleeding stop time (min) 5 min   Venous bleeding stop time (min) 5 min

## 2022-11-13 NOTE — NURSING TRANSFER
Nursing Transfer Note      11/12/2022     Reason patient is being transferred: HD 5TH FLOOR    Transfer From: TELE ROOM 254    Transfer via bed    Transfer with cardiac monitoring    Transported by [facility]    Medicines sent: NONE    Any special needs or follow-up needed: POST TREATMENT HEMODYNAMIC MONITORING    Chart send with patient: No    Notified: spouse        Upon arrival to floor: patient oriented to room and bed in lowest position

## 2022-11-14 NOTE — DISCHARGE SUMMARY
[facility] - Telemetry ([facility])  [facility] Medicine  Discharge Summary      Patient Name: Gilda Schroeder  MRN: [id number]  ROBERT: [id number]  Patient Class: IP- Inpatient  Admission Date: 11/9/2022  Hospital Length of Stay: 4 days  Discharge Date and Time: 11/13/2022  3:22 PM  Attending Physician: Tierney att. providers found   Discharging Provider: Alyssa Pickering NP  Primary Care Provider: Roselia Rose MD    Primary Care Team: [facility] MEDICINE B    HPI:   Patient is a 47-year-old aa female with past medical history significant for hypertension, type 2 diabetes mellitus, CAD status post cardiac stenting and CABG, chronic diastolic CHF, paroxysmal AFib status post cardioversion, on Eliquis, ESRD on hemodialysis every Tuesday, Thursday and Saturday, gastritis, diabetic gastroparesis, who presented as a transfer from [facility] for evaluation of bleeding AV fistula. Patient was recently admitted for abdominal pain along with nausea and vomiting. CT abd and pelvis, CTA, and MRCP were unremarkable. She was treated for dka. GI consulted on case and EGD performed on 11/9 showed gastritis. Nephro consulted on case for dialysis. AV fistula started bleeding on 11/8. Pressure was applied however fistula continued to bleed. A small stitch was placed. Nephrology then recommended transfer for fistulogram and vascular surgery consult. Case was discussed with Dr. Nielson and patient was transferred to [facility]. AV fistula currently not bleeding and patient is without any complaints.       Procedure(s) (LRB):  FISTULOGRAM (N/A)      Hospital Course:   Patient was admitted for complication of AV dialysis fistula under the care of [facility]. Patient had fistulogram today - will have HD in AM. BP is stable. Will restart liquid diet and advance as tolerated. Will need PO protonix 40mg BID X 2 months and PO sucralfate QID X 2 weeks. Discussed with GI (Avinash) who recommends hold reglan for now as EDG  showed no food in stomach. If tolerates diet, will likely dc tomorrow.    11/11 POD #1, s/p FISTULOGRAM With 8 x 100 mm balloon angioplasty of 90% distal anastomotic stricture. Site edematous with ecchymosis this am, no evidence of active bleeding. HD stable. Per nephrology hold HD today, will reassess in am. If no improvement in site will consult IR in am for placement of vas cath.     11/12 POD #2 edema to AVF site has improved, ok for HD on tomorrow per vascular. K 5.9 give lokelma, f/u CMP in am     11/13 patient s/p HD, tolerated well,  VSS. Recommend decrease losartan to 25 mg daily. Stable for discharge, resume normal HD schedule, f/u with pcp within 1 week. Carafate and PPI sent to pharmacy.        Goals of Care Treatment Preferences:  Code Status: Full Code       LaPOST: Yes  What is most important right now is to focus on avoiding the hospital.  Accordingly, we have decided that the best plan to meet the patient's goals includes continuing with palliative care.      Consults:   Consults (From admission, onward)        Status Ordering Provider     Inpatient consult to Nephrology  Once        Provider:  Buddy Smith MD    Completed SABRINA REINA          Gastritis  --Cont protonix 40mg BID X 2 months  --Sucralfate qid X 2 weeks  --OP f/u with GI after discharge        Final Active Diagnoses:    Diagnosis Date Noted POA    PRINCIPAL PROBLEM:  Complication of AV dialysis fistula [T82.9XXA] 11/09/2022 Yes    Gastritis [K29.70]  Yes    Gastroparesis [K31.84] 01/17/2018 Yes    Type 2 diabetes mellitus [E11.9] 01/17/2018 Yes    ESRD (end stage renal disease) [N18.6] 08/18/2017 Yes    Anemia in CKD (chronic kidney disease) [N18.9, D63.1] 07/05/2016 Yes      Problems Resolved During this Admission:       Discharged Condition: stable    Disposition: Home or Self Care    Follow Up:   Follow-up Information     Roselia Rose MD. Schedule an appointment as soon as possible for a visit in 1 week(s).     Specialties: Nephrology, Internal Medicine  Contact information:  [address] [address]  [place] [address]  [phone]                       Patient Instructions:   No discharge procedures on file.    Significant Diagnostic Studies: Labs:   CMP   Recent Labs   Lab 11/12/22  0957 11/13/22  0526   * 130*   K 5.9* 4.6   CL 92* 91*   CO2 13* 19*   * 120*   BUN 59* 40*   CREATININE 8.0* 6.1*   CALCIUM 7.8* 8.4*   PROT 7.3 7.6   ALBUMIN 3.2* 3.3*   BILITOT 1.1* 1.4*   ALKPHOS 200* 194*   AST 78* 82*   ALT 66* 68*   ANIONGAP 18* 20*    and CBC   Recent Labs   Lab 11/13/22 0526   WBC 7.94   HGB 10.2*   HCT 30.3*   PLT 73*       Pending Diagnostic Studies:     None         Medications:  Reconciled Home Medications:      Medication List      START taking these medications    sucralfate 100 mg/mL suspension  Commonly known as: CARAFATE  Take 10 mLs (1 g total) by mouth every 6 (six) hours. for 14 days        CHANGE how you take these medications    cloNIDine 0.3 mg/24 hr td ptwk 0.3 mg/24 hr  Commonly known as: CATAPRES  Place 1 patch onto the skin every 7 days.  What changed: when to take this     losartan 25 MG tablet  Commonly known as: COZAAR  Take 1 tablet (25 mg total) by mouth once daily.  What changed:   · medication strength  · how much to take     * pantoprazole 40 mg injection  Commonly known as: PROTONIX  Inject 40 mg into the vein once daily.  What changed: Another medication with the same name was added. Make sure you understand how and when to take each.     * pantoprazole 40 MG tablet  Commonly known as: PROTONIX  Take 1 tablet (40 mg total) by mouth 2 (two) times daily.  What changed: You were already taking a medication with the same name, and this prescription was added. Make sure you understand how and when to take each.         * This list has 2 medication(s) that are the same as other medications prescribed for you. Read the directions carefully, and ask your doctor or other care  provider to review them with you.            CONTINUE taking these medications    acetaminophen 500 MG tablet  Commonly known as: TYLENOL  Take 500 mg by mouth every 6 (six) hours as needed for Pain (headache).     aspirin 81 MG EC tablet  Commonly known as: ECOTRIN  Take 81 mg by mouth every morning.     atorvastatin 80 MG tablet  Commonly known as: LIPITOR  Take 1 tablet (80 mg total) by mouth every evening.     blood sugar diagnostic Strp  To check BG 3-4 times daily, to use with insurance preferred meter  E 11.65     blood-glucose meter kit  To check BG 3 to 4 times daily, to use with insurance preferred meter  DX E 11.65     buPROPion 100 MG TBSR 12 hr tablet  Commonly known as: WELLBUTRIN SR  Take 1 tablet (100 mg total) by mouth 2 (two) times daily.     carvediloL 6.25 MG tablet  Commonly known as: COREG  Take 1 tablet (6.25 mg total) by mouth 2 (two) times daily.     cinacalcet 60 MG Tab  Commonly known as: SENSIPAR  Take 60 mg by mouth daily with breakfast.     EScitalopram oxalate 20 MG tablet  Commonly known as: LEXAPRO  Take 20 mg by mouth every morning.     ferrous sulfate 325 (65 FE) MG EC tablet  Take 1 tablet (325 mg total) by mouth 2 (two) times daily.     GVOKE HYPOPEN 2-PACK 1 mg/0.2 mL Atin  Generic drug: glucagon  Inject 1 mg into the skin as needed (as needed for emergent hypoglycemia (low bloodsugar)). Diagnosis Code: E11.65     hydrALAZINE 20 mg/mL injection  Commonly known as: APRESOLINE  Inject 0.5 mLs (10 mg total) into the vein every 8 (eight) hours as needed (for SBP greater than 170).     insulin detemir U-100 100 unit/mL (3 mL) Inpn pen  Commonly known as: Levemir FLEXTOUCH  Inject 4 Units into the skin every evening.     lancets Misc  To check BG 3-4 times daily, to use with insurance preferred meter  E 11.65     metoclopramide HCl 5 mg/mL injection  Commonly known as: REGLAN  Inject 2 mLs (10 mg total) into the vein every 8 (eight) hours.        STOP taking these medications     PIPERACILLIN-TAZOBACTAM 4.5G/100ML D5W IVPB (READY TO MIX)            Indwelling Lines/Drains at time of discharge:   Lines/Drains/Airways     Drain  Duration                Hemodialysis AV Fistula 10/13/18 2350 Left upper arm 1491 days                Time spent on the discharge of patient: 30 minutes         Alyssa Pickering NP  Department of Hospital Medicine  [facility] - Telemetry ([facility])

## 2023-01-01 ENCOUNTER — HOSPITAL ENCOUNTER (OUTPATIENT)
Facility: HOSPITAL | Age: 48
Discharge: HOME | End: 2023-05-24
Attending: SURGERY | Admitting: SURGERY
Payer: MEDICARE

## 2023-01-01 VITALS
RESPIRATION RATE: 20 BRPM | DIASTOLIC BLOOD PRESSURE: 95 MMHG | OXYGEN SATURATION: 95 % | HEART RATE: 88 BPM | SYSTOLIC BLOOD PRESSURE: 188 MMHG

## 2023-01-01 DIAGNOSIS — Z01.810 PREOP CARDIOVASCULAR EXAM: ICD-10-CM

## 2023-01-01 DIAGNOSIS — T82.590A MALFUNCTION OF ARTERIOVENOUS DIALYSIS FISTULA, INITIAL ENCOUNTER: ICD-10-CM

## 2023-01-01 DIAGNOSIS — T82.9XXD COMPLICATION OF ARTERIOVENOUS DIALYSIS FISTULA, SUBSEQUENT ENCOUNTER: Primary | ICD-10-CM

## 2023-01-01 LAB
ANION GAP SERPL CALC-SCNC: 19 MMOL/L (ref 8–16)
APTT PPP: 30.8 SEC (ref 21–32)
BUN SERPL-MCNC: 25 MG/DL (ref 6–20)
CALCIUM SERPL-MCNC: 10 MG/DL (ref 8.7–10.5)
CHLORIDE SERPL-SCNC: 89 MMOL/L (ref 95–110)
CO2 SERPL-SCNC: 28 MMOL/L (ref 23–29)
CREAT SERPL-MCNC: 5.5 MG/DL (ref 0.5–1.4)
ERYTHROCYTE [DISTWIDTH] IN BLOOD BY AUTOMATED COUNT: 16.3 % (ref 11.5–14.5)
EST. GFR  (NO RACE VARIABLE): 9 ML/MIN/1.73 M^2
GLUCOSE SERPL-MCNC: 163 MG/DL (ref 70–110)
HCT VFR BLD AUTO: 39.4 % (ref 37–48.5)
HGB BLD-MCNC: 12.6 G/DL (ref 12–16)
INR PPP: 1.2 (ref 0.8–1.2)
MCH RBC QN AUTO: 30.1 PG (ref 27–31)
MCHC RBC AUTO-ENTMCNC: 32 G/DL (ref 32–36)
MCV RBC AUTO: 94 FL (ref 82–98)
PLATELET # BLD AUTO: 189 K/UL (ref 150–450)
PMV BLD AUTO: 12 FL (ref 9.2–12.9)
POTASSIUM SERPL-SCNC: 4 MMOL/L (ref 3.5–5.1)
PROTHROMBIN TIME: 13.7 SEC (ref 9–12.5)
RBC # BLD AUTO: 4.19 M/UL (ref 4–5.4)
SODIUM SERPL-SCNC: 136 MMOL/L (ref 136–145)
WBC # BLD AUTO: 5.99 K/UL (ref 3.9–12.7)

## 2023-01-01 PROCEDURE — 85610 PROTHROMBIN TIME: CPT | Performed by: SURGERY

## 2023-01-01 PROCEDURE — 85027 COMPLETE CBC AUTOMATED: CPT | Performed by: SURGERY

## 2023-01-01 PROCEDURE — C1769 GUIDE WIRE: HCPCS | Performed by: SURGERY

## 2023-01-01 PROCEDURE — 25000003 PHARM REV CODE 250: Performed by: SURGERY

## 2023-01-01 PROCEDURE — 80048 BASIC METABOLIC PNL TOTAL CA: CPT | Performed by: SURGERY

## 2023-01-01 PROCEDURE — 99152 MOD SED SAME PHYS/QHP 5/>YRS: CPT | Performed by: SURGERY

## 2023-01-01 PROCEDURE — 63600175 PHARM REV CODE 636 W HCPCS: Performed by: SURGERY

## 2023-01-01 PROCEDURE — 93010 ELECTROCARDIOGRAM REPORT: CPT | Mod: ,,, | Performed by: SPECIALIST

## 2023-01-01 PROCEDURE — 93005 ELECTROCARDIOGRAM TRACING: CPT | Mod: 59 | Performed by: SPECIALIST

## 2023-01-01 PROCEDURE — 36902 INTRO CATH DIALYSIS CIRCUIT: CPT | Performed by: SURGERY

## 2023-01-01 PROCEDURE — C1725 CATH, TRANSLUMIN NON-LASER: HCPCS | Performed by: SURGERY

## 2023-01-01 PROCEDURE — C1894 INTRO/SHEATH, NON-LASER: HCPCS | Performed by: SURGERY

## 2023-01-01 PROCEDURE — 25500020 PHARM REV CODE 255: Performed by: SURGERY

## 2023-01-01 PROCEDURE — 93010 EKG 12-LEAD: ICD-10-PCS | Mod: ,,, | Performed by: SPECIALIST

## 2023-01-01 PROCEDURE — 85730 THROMBOPLASTIN TIME PARTIAL: CPT | Performed by: SURGERY

## 2023-01-01 PROCEDURE — C1887 CATHETER, GUIDING: HCPCS | Performed by: SURGERY

## 2023-01-01 RX ORDER — HYDRALAZINE HYDROCHLORIDE 20 MG/ML
10 INJECTION INTRAMUSCULAR; INTRAVENOUS ONCE
Status: COMPLETED | OUTPATIENT
Start: 2023-01-01 | End: 2023-01-01

## 2023-01-01 RX ORDER — IODIXANOL 320 MG/ML
INJECTION, SOLUTION INTRAVASCULAR
Status: DISCONTINUED | OUTPATIENT
Start: 2023-01-01 | End: 2023-01-01 | Stop reason: HOSPADM

## 2023-01-01 RX ORDER — CEFAZOLIN SODIUM 1 G/3ML
INJECTION, POWDER, FOR SOLUTION INTRAMUSCULAR; INTRAVENOUS
Status: DISCONTINUED | OUTPATIENT
Start: 2023-01-01 | End: 2023-01-01 | Stop reason: HOSPADM

## 2023-01-01 RX ORDER — HYDRALAZINE HYDROCHLORIDE 20 MG/ML
INJECTION INTRAMUSCULAR; INTRAVENOUS
Status: DISCONTINUED
Start: 2023-01-01 | End: 2023-01-01 | Stop reason: HOSPADM

## 2023-01-01 RX ORDER — FENTANYL CITRATE 50 UG/ML
INJECTION, SOLUTION INTRAMUSCULAR; INTRAVENOUS
Status: DISCONTINUED | OUTPATIENT
Start: 2023-01-01 | End: 2023-01-01 | Stop reason: HOSPADM

## 2023-01-01 RX ORDER — ONDANSETRON 2 MG/ML
INJECTION INTRAMUSCULAR; INTRAVENOUS
Status: DISCONTINUED
Start: 2023-01-01 | End: 2023-01-01 | Stop reason: HOSPADM

## 2023-01-01 RX ORDER — ONDANSETRON 2 MG/ML
4 INJECTION INTRAMUSCULAR; INTRAVENOUS ONCE
Status: COMPLETED | OUTPATIENT
Start: 2023-01-01 | End: 2023-01-01

## 2023-01-01 RX ORDER — MIDAZOLAM HYDROCHLORIDE 1 MG/ML
INJECTION INTRAMUSCULAR; INTRAVENOUS
Status: DISCONTINUED | OUTPATIENT
Start: 2023-01-01 | End: 2023-01-01 | Stop reason: HOSPADM

## 2023-01-01 RX ORDER — LIDOCAINE HYDROCHLORIDE 10 MG/ML
INJECTION, SOLUTION EPIDURAL; INFILTRATION; INTRACAUDAL; PERINEURAL
Status: DISCONTINUED | OUTPATIENT
Start: 2023-01-01 | End: 2023-01-01 | Stop reason: HOSPADM

## 2023-01-01 RX ADMIN — ONDANSETRON HYDROCHLORIDE 4 MG: 2 INJECTION, SOLUTION INTRAMUSCULAR; INTRAVENOUS at 03:05

## 2023-01-01 RX ADMIN — HYDRALAZINE HYDROCHLORIDE 10 MG: 20 INJECTION INTRAMUSCULAR; INTRAVENOUS at 03:05

## 2023-01-01 RX ADMIN — HYDRALAZINE HYDROCHLORIDE 10 MG: 20 INJECTION, SOLUTION INTRAMUSCULAR; INTRAVENOUS at 12:05

## 2023-05-24 NOTE — Clinical Note
25 ml of contrast were injected throughout the case. 25 mL of contrast was the total wasted during the case. 50 mL was the total amount used during the case.

## 2023-05-24 NOTE — OP NOTE
[facility]  Vascular Surgery  Operative Note    SUMMARY     Date of Procedure: 5/24/2023     Procedure: Procedure(s) (LRB):  Fistulogram (Bilateral)  PTA, AV FISTULA (Left)     Surgeon(s) and Role:     * Ali Khoobehi, MD - Primary    Assisting Surgeon: None    Pre-Operative Diagnosis: Malfunction of arteriovenous dialysis fistula, initial encounter [T82.590A]    Post-Operative Diagnosis: Post-Op Diagnosis Codes:     * Malfunction of arteriovenous dialysis fistula, initial encounter [T82.590A]    Anesthesia: RN IV Sedation    Operative Findings (including complications, if any): severe stenosis axillary daniel    Description of Technical Procedures:   Indications, risks, benefits of left arm fistulogram with possible angioplasty were discussed with the patient. Risks discussed included possible bleeding, access failure, infection, cardiac arrest, need for future interventions, and death. Patient was agreeable for the procedure and signed consent.    Under my direct supervision, moderate sedation was administered during the course of the procedure with Versed (4 mg) and Fentanyl (150 mcgs). An independent observer was present throughout the procedure, there was continuous monitoring of cardiac telemetry, hemodynamics and pulse oximetry. I was personally present and spent 30 minutes face to face time during sedation administration.    6F sheath was placed in an antegrade fashion. Fistulogram was performed which demonstrated severe stenosis proximally of approximately 90%. Angioplasty was performed with an 8 millimeter balloon. Completion fistulogram demonstrated resolution of the stenosis. No central stenosis was noted.    Sheath was removed and puncture site repaired with 4-0 Vicryl suture. Patient was brought out of the procedure room in stable condition.    Significant Surgical Tasks Conducted by the Assistant(s), if Applicable: n/a    Estimated Blood Loss (EBL): * No values recorded between 5/24/2023   1:20 PM and 5/24/2023  1:37 PM *           Implants: * No implants in log *    Specimens:   Specimen (24h ago, onward)      None                    Condition: Good    Disposition: PACU - hemodynamically stable.    Attestation: I performed the procedure.

## 2023-05-24 NOTE — Clinical Note
The site was marked. The right brachial was prepped. The site was prepped with ChloraPrep. The site was clipped. The patient was draped. The patient was positioned supine. The patient was secured using safety straps.

## 2023-05-24 NOTE — Clinical Note
The sheath was removed from the left AV fistula. SITE SUTURED WITH 4.0 VICRYL RAPIDE PER DR. KHOOBEHI

## 2023-07-21 PROBLEM — R29.818 NEUROLOGICAL DEFICIT PRESENT: Status: ACTIVE | Noted: 2023-01-01

## 2023-07-21 PROBLEM — K66.8 PNEUMOPERITONEUM: Status: ACTIVE | Noted: 2023-01-01

## 2023-07-21 PROBLEM — R41.89 UNRESPONSIVE: Status: ACTIVE | Noted: 2023-01-01

## 2023-07-23 PROBLEM — I63.133 CEREBROVASCULAR ACCIDENT (CVA) DUE TO BILATERAL EMBOLISM OF CAROTID ARTERIES: Status: ACTIVE | Noted: 2023-01-01

## 2023-07-23 PROBLEM — K74.60 CIRRHOSIS OF LIVER WITH ASCITES: Status: ACTIVE | Noted: 2023-01-01

## 2023-07-23 PROBLEM — R18.8 CIRRHOSIS OF LIVER WITH ASCITES: Status: ACTIVE | Noted: 2023-01-01

## 2023-07-23 PROBLEM — R60.1 ANASARCA: Status: ACTIVE | Noted: 2023-01-01

## 2023-07-23 PROBLEM — N28.9 NON-FUNCTIONING KIDNEY: Status: ACTIVE | Noted: 2023-01-01

## 2023-07-25 PROBLEM — R41.82 ALTERED MENTAL STATUS: Status: ACTIVE | Noted: 2023-01-01

## 2023-07-29 PROBLEM — B95.8 BACTEREMIA DUE TO STAPHYLOCOCCUS: Status: ACTIVE | Noted: 2023-01-01

## 2023-07-29 PROBLEM — R78.81 BACTEREMIA DUE TO STAPHYLOCOCCUS: Status: ACTIVE | Noted: 2023-01-01

## 2023-08-03 PROBLEM — K92.0 DARK EMESIS: Status: ACTIVE | Noted: 2023-01-01

## 2023-08-03 PROBLEM — R06.03 RESPIRATORY DISTRESS: Status: ACTIVE | Noted: 2023-01-01

## 2023-08-03 PROBLEM — R00.0 TACHYARRHYTHMIA: Status: ACTIVE | Noted: 2023-01-01

## 2023-08-03 PROBLEM — Z51.5 COMFORT MEASURES ONLY STATUS: Status: ACTIVE | Noted: 2023-01-01

## 2025-03-24 NOTE — Clinical Note
Manual pressure was applied to the left brachial vein sheath insertion site. negative - no chest pain

## (undated) DEVICE — VISIPAQUE 320 200ML +PK

## (undated) DEVICE — CATH NC QUANTUM APEX MR 2.5X8

## (undated) DEVICE — KIT SYR REUSABLE

## (undated) DEVICE — VALVE CONTROL COPILOT

## (undated) DEVICE — CATH MINI TREK RX 2.0MM X 15MM

## (undated) DEVICE — INFLATOR ENCORE 26 BLLN INFL

## (undated) DEVICE — CATH DXTERITY JL35 100CM 5FR

## (undated) DEVICE — CATH TREK RX 2.50MM X 8MM

## (undated) DEVICE — GUIDEWIRE ANGLED GLIDE .035-150 GR3506

## (undated) DEVICE — GUIDE LAUNCHER 6FR EBU 3.5

## (undated) DEVICE — GUIDEWIRE STD .035X260CM ANG

## (undated) DEVICE — CATH TREK RX 2.50MM X 12MM

## (undated) DEVICE — GUIDEWIRE VERRATA + STR 185CM

## (undated) DEVICE — CATH NC TREK RX 2.5X15MM

## (undated) DEVICE — KIT MANIFOLD LOW PRESS TUBING

## (undated) DEVICE — CATH GUIDE LINER  V3 6F

## (undated) DEVICE — PACK CATH LAB

## (undated) DEVICE — Device

## (undated) DEVICE — KIT INTRODUCER STIFFEN MICRO

## (undated) DEVICE — GUIDE LAUNCHER 6FR JR 4.0

## (undated) DEVICE — HEMOSTAT VASC BAND REG 24CM

## (undated) DEVICE — CATH NC TREK RX 3X8MM

## (undated) DEVICE — SHEATH INTRODUCER 7FR 11CM

## (undated) DEVICE — GUIDEWIRE STF .035X180CM ANG

## (undated) DEVICE — NDL PERCUTANEOUS 21G 7CM VASC

## (undated) DEVICE — CATH BLLN OTW MUSTANG 8.0MMX40MMX75CM

## (undated) DEVICE — PACK ANGIOPLASTY ACCESS PLUS

## (undated) DEVICE — OMNIPAQUE 300MG 150ML VIAL

## (undated) DEVICE — KIT HAND CONTROL HIGH PRESSUR

## (undated) DEVICE — DRESSING ADH COVADERM PLUS 4X4

## (undated) DEVICE — KIT GLIDESHEATH SLEND 6FR 10CM

## (undated) DEVICE — KIT INFLATION MERIT (IN8152, HP9200E)

## (undated) DEVICE — WIRE GUIDE SAFE-T-J .035 260CM

## (undated) DEVICE — CATH DXTERITY PG145 110CM 6FR

## (undated) DEVICE — CATH 5FR JR4 100CM

## (undated) DEVICE — GUIDEWIRE RUNTHROUGH EF 180CM

## (undated) DEVICE — GLIDE CATH ANGLED 4FR 65CM

## (undated) DEVICE — KIT WATCHDOG HEMSTAS VALVE 8FR